# Patient Record
Sex: FEMALE | Race: BLACK OR AFRICAN AMERICAN | NOT HISPANIC OR LATINO | Employment: UNEMPLOYED | RURAL
[De-identification: names, ages, dates, MRNs, and addresses within clinical notes are randomized per-mention and may not be internally consistent; named-entity substitution may affect disease eponyms.]

---

## 2021-06-23 ENCOUNTER — OFFICE VISIT (OUTPATIENT)
Dept: FAMILY MEDICINE | Facility: CLINIC | Age: 51
End: 2021-06-23
Payer: MEDICARE

## 2021-06-23 VITALS
WEIGHT: 293 LBS | TEMPERATURE: 98 F | RESPIRATION RATE: 18 BRPM | HEART RATE: 83 BPM | HEIGHT: 62 IN | DIASTOLIC BLOOD PRESSURE: 67 MMHG | BODY MASS INDEX: 53.92 KG/M2 | SYSTOLIC BLOOD PRESSURE: 96 MMHG

## 2021-06-23 DIAGNOSIS — I73.9 PERIPHERAL VASCULAR DISEASE, UNSPECIFIED: ICD-10-CM

## 2021-06-23 DIAGNOSIS — M79.604 BILATERAL LOWER EXTREMITY PAIN: ICD-10-CM

## 2021-06-23 DIAGNOSIS — R21 RASH AND NONSPECIFIC SKIN ERUPTION: ICD-10-CM

## 2021-06-23 DIAGNOSIS — I10 HYPERTENSION, UNSPECIFIED TYPE: ICD-10-CM

## 2021-06-23 DIAGNOSIS — E78.5 HYPERLIPIDEMIA, UNSPECIFIED HYPERLIPIDEMIA TYPE: ICD-10-CM

## 2021-06-23 DIAGNOSIS — Z00.00 ROUTINE GENERAL MEDICAL EXAMINATION AT A HEALTH CARE FACILITY: Primary | ICD-10-CM

## 2021-06-23 DIAGNOSIS — N19 RENAL FAILURE, UNSPECIFIED CHRONICITY: ICD-10-CM

## 2021-06-23 DIAGNOSIS — M79.605 BILATERAL LOWER EXTREMITY PAIN: ICD-10-CM

## 2021-06-23 LAB
BASOPHILS # BLD AUTO: 0.07 K/UL (ref 0–0.2)
BASOPHILS NFR BLD AUTO: 1.2 % (ref 0–1)
DIFFERENTIAL METHOD BLD: ABNORMAL
EOSINOPHIL # BLD AUTO: 0.3 K/UL (ref 0–0.5)
EOSINOPHIL NFR BLD AUTO: 5.3 % (ref 1–4)
ERYTHROCYTE [DISTWIDTH] IN BLOOD BY AUTOMATED COUNT: 13.4 % (ref 11.5–14.5)
EST. AVERAGE GLUCOSE BLD GHB EST-MCNC: 87 MG/DL
HBA1C MFR BLD HPLC: 5.2 % (ref 4.5–6.6)
HCT VFR BLD AUTO: 43.2 % (ref 38–47)
HGB BLD-MCNC: 13.2 G/DL (ref 12–16)
IMM GRANULOCYTES # BLD AUTO: 0.02 K/UL (ref 0–0.04)
IMM GRANULOCYTES NFR BLD: 0.4 % (ref 0–0.4)
LYMPHOCYTES # BLD AUTO: 1.35 K/UL (ref 1–4.8)
LYMPHOCYTES NFR BLD AUTO: 23.8 % (ref 27–41)
MCH RBC QN AUTO: 29.2 PG (ref 27–31)
MCHC RBC AUTO-ENTMCNC: 30.6 G/DL (ref 32–36)
MCV RBC AUTO: 95.6 FL (ref 80–96)
MONOCYTES # BLD AUTO: 0.47 K/UL (ref 0–0.8)
MONOCYTES NFR BLD AUTO: 8.3 % (ref 2–6)
MPC BLD CALC-MCNC: 10.3 FL (ref 9.4–12.4)
NEUTROPHILS # BLD AUTO: 3.46 K/UL (ref 1.8–7.7)
NEUTROPHILS NFR BLD AUTO: 61 % (ref 53–65)
NRBC # BLD AUTO: 0 X10E3/UL
NRBC, AUTO (.00): 0 %
PLATELET # BLD AUTO: 225 K/UL (ref 150–400)
RBC # BLD AUTO: 4.52 M/UL (ref 4.2–5.4)
WBC # BLD AUTO: 5.67 K/UL (ref 4.5–11)

## 2021-06-23 PROCEDURE — 3008F BODY MASS INDEX DOCD: CPT | Mod: ,,, | Performed by: NURSE PRACTITIONER

## 2021-06-23 PROCEDURE — 83036 HEMOGLOBIN GLYCOSYLATED A1C: CPT | Mod: GZ,,, | Performed by: CLINICAL MEDICAL LABORATORY

## 2021-06-23 PROCEDURE — 85025 COMPLETE CBC W/AUTO DIFF WBC: CPT | Mod: ,,, | Performed by: CLINICAL MEDICAL LABORATORY

## 2021-06-23 PROCEDURE — 80061 LIPID PANEL: CPT | Mod: ,,, | Performed by: CLINICAL MEDICAL LABORATORY

## 2021-06-23 PROCEDURE — 80061 LIPID PANEL: ICD-10-PCS | Mod: ,,, | Performed by: CLINICAL MEDICAL LABORATORY

## 2021-06-23 PROCEDURE — 99204 PR OFFICE/OUTPT VISIT, NEW, LEVL IV, 45-59 MIN: ICD-10-PCS | Mod: ,,, | Performed by: NURSE PRACTITIONER

## 2021-06-23 PROCEDURE — 99204 OFFICE O/P NEW MOD 45 MIN: CPT | Mod: ,,, | Performed by: NURSE PRACTITIONER

## 2021-06-23 PROCEDURE — 83036 HEMOGLOBIN A1C: ICD-10-PCS | Mod: GZ,,, | Performed by: CLINICAL MEDICAL LABORATORY

## 2021-06-23 PROCEDURE — 80053 COMPREHEN METABOLIC PANEL: CPT | Mod: ,,, | Performed by: CLINICAL MEDICAL LABORATORY

## 2021-06-23 PROCEDURE — 80053 COMPREHENSIVE METABOLIC PANEL: ICD-10-PCS | Mod: ,,, | Performed by: CLINICAL MEDICAL LABORATORY

## 2021-06-23 PROCEDURE — 85025 CBC WITH DIFFERENTIAL: ICD-10-PCS | Mod: ,,, | Performed by: CLINICAL MEDICAL LABORATORY

## 2021-06-23 PROCEDURE — 3008F PR BODY MASS INDEX (BMI) DOCUMENTED: ICD-10-PCS | Mod: ,,, | Performed by: NURSE PRACTITIONER

## 2021-06-23 RX ORDER — ALBUTEROL SULFATE 90 UG/1
2 AEROSOL, METERED RESPIRATORY (INHALATION)
COMMUNITY
Start: 2021-05-25

## 2021-06-23 RX ORDER — CARVEDILOL 3.12 MG/1
3.12 TABLET ORAL 2 TIMES DAILY
COMMUNITY
Start: 2021-04-28 | End: 2023-06-27 | Stop reason: ALTCHOICE

## 2021-06-23 RX ORDER — FLUTICASONE PROPIONATE AND SALMETEROL 250; 50 UG/1; UG/1
1 POWDER RESPIRATORY (INHALATION) 2 TIMES DAILY
COMMUNITY
Start: 2021-05-25

## 2021-06-23 RX ORDER — SEVELAMER CARBONATE 800 MG/1
800 TABLET, FILM COATED ORAL
COMMUNITY

## 2021-06-23 RX ORDER — MIDODRINE HYDROCHLORIDE 5 MG/1
5 TABLET ORAL 2 TIMES DAILY PRN
COMMUNITY
Start: 2021-06-16

## 2021-06-23 RX ORDER — PANTOPRAZOLE SODIUM 40 MG/1
40 TABLET, DELAYED RELEASE ORAL DAILY
COMMUNITY
Start: 2021-04-28 | End: 2023-06-27 | Stop reason: SDUPTHER

## 2021-06-23 RX ORDER — CINACALCET 30 MG/1
30 TABLET, FILM COATED ORAL NIGHTLY
COMMUNITY

## 2021-06-24 LAB
ALBUMIN SERPL BCP-MCNC: 3.4 G/DL (ref 3.5–5)
ALBUMIN/GLOB SERPL: 0.7 {RATIO}
ALP SERPL-CCNC: 119 U/L (ref 39–100)
ALT SERPL W P-5'-P-CCNC: 17 U/L (ref 13–56)
ANION GAP SERPL CALCULATED.3IONS-SCNC: 14 MMOL/L (ref 7–16)
AST SERPL W P-5'-P-CCNC: 14 U/L (ref 15–37)
BILIRUB SERPL-MCNC: 0.4 MG/DL (ref 0–1.2)
BUN SERPL-MCNC: 31 MG/DL (ref 7–18)
BUN/CREAT SERPL: 3 (ref 6–20)
CALCIUM SERPL-MCNC: 8.9 MG/DL (ref 8.5–10.1)
CHLORIDE SERPL-SCNC: 93 MMOL/L (ref 98–107)
CHOLEST SERPL-MCNC: 216 MG/DL (ref 0–200)
CHOLEST/HDLC SERPL: 4 {RATIO}
CO2 SERPL-SCNC: 32 MMOL/L (ref 21–32)
CREAT SERPL-MCNC: 10.6 MG/DL (ref 0.55–1.02)
GLOBULIN SER-MCNC: 5.1 G/DL (ref 2–4)
GLUCOSE SERPL-MCNC: 83 MG/DL (ref 74–106)
HDLC SERPL-MCNC: 54 MG/DL (ref 40–60)
LDLC SERPL CALC-MCNC: 127 MG/DL
LDLC/HDLC SERPL: 2.4 {RATIO}
NONHDLC SERPL-MCNC: 162 MG/DL
POTASSIUM SERPL-SCNC: 4.5 MMOL/L (ref 3.5–5.1)
PROT SERPL-MCNC: 8.5 G/DL (ref 6.4–8.2)
SODIUM SERPL-SCNC: 134 MMOL/L (ref 136–145)
TRIGL SERPL-MCNC: 177 MG/DL (ref 35–150)
VLDLC SERPL-MCNC: 35 MG/DL

## 2021-06-25 PROBLEM — M79.604 BILATERAL LOWER EXTREMITY PAIN: Status: ACTIVE | Noted: 2021-06-25

## 2021-06-25 PROBLEM — M79.605 BILATERAL LOWER EXTREMITY PAIN: Status: ACTIVE | Noted: 2021-06-25

## 2021-06-25 PROBLEM — E78.5 HYPERLIPIDEMIA: Status: ACTIVE | Noted: 2021-06-25

## 2021-06-25 PROBLEM — R21 RASH AND NONSPECIFIC SKIN ERUPTION: Status: ACTIVE | Noted: 2021-06-25

## 2021-06-25 PROBLEM — I10 HYPERTENSION: Status: ACTIVE | Noted: 2021-06-25

## 2021-06-25 PROBLEM — I73.9 PERIPHERAL VASCULAR DISEASE, UNSPECIFIED: Status: ACTIVE | Noted: 2021-06-25

## 2021-06-25 PROBLEM — N19 RENAL FAILURE: Status: ACTIVE | Noted: 2021-06-25

## 2021-06-25 PROBLEM — Z00.00 ROUTINE GENERAL MEDICAL EXAMINATION AT A HEALTH CARE FACILITY: Status: ACTIVE | Noted: 2021-06-25

## 2021-06-28 ENCOUNTER — CLINICAL SUPPORT (OUTPATIENT)
Dept: FAMILY MEDICINE | Facility: CLINIC | Age: 51
End: 2021-06-28
Payer: MEDICARE

## 2021-06-28 DIAGNOSIS — R77.1 ABNORMALITY OF GLOBULIN: Primary | ICD-10-CM

## 2021-06-28 PROCEDURE — 84165 PROTEIN E-PHORESIS SERUM: CPT | Mod: 26,,, | Performed by: PATHOLOGY

## 2021-06-28 PROCEDURE — 84165 PR  PROTEIN E-PHORESIS, SERUM: ICD-10-PCS | Mod: ,,, | Performed by: CLINICAL MEDICAL LABORATORY

## 2021-06-28 PROCEDURE — 84165 PR  PROTEIN E-PHORESIS, SERUM: ICD-10-PCS | Mod: 26,,, | Performed by: PATHOLOGY

## 2021-06-28 PROCEDURE — 84165 PROTEIN E-PHORESIS SERUM: CPT | Mod: ,,, | Performed by: CLINICAL MEDICAL LABORATORY

## 2021-06-29 LAB
ALBUMIN PE, BLOOD: 4.51 G/DL (ref 3.5–5.2)
ALPHA1 GLOB SERPL ELPH-MCNC: 0.2 G/DL (ref 0.1–0.4)
ALPHA2 GLOB SERPL ELPH-MCNC: 0.7 G/DL (ref 0.4–1.3)
B-GLOBULIN SERPL ELPH-MCNC: 0.8 G/DL (ref 0.5–1.5)
GAMMA GLOB SERPL ELPH-MCNC: 1.4 G/DL (ref 0.5–1.8)
PATH INTERP BLD-IMP: NORMAL
PROT SERPL-MCNC: 7.6 G/DL (ref 6.4–8.2)

## 2021-06-30 PROBLEM — R77.1 ABNORMALITY OF GLOBULIN: Status: ACTIVE | Noted: 2021-06-30

## 2021-07-26 ENCOUNTER — HOSPITAL ENCOUNTER (OUTPATIENT)
Dept: RADIOLOGY | Facility: HOSPITAL | Age: 51
Discharge: HOME | End: 2021-07-26
Attending: NURSE PRACTITIONER
Payer: MEDICARE

## 2021-07-26 DIAGNOSIS — M79.604 BILATERAL LOWER EXTREMITY PAIN: ICD-10-CM

## 2021-07-26 DIAGNOSIS — I73.9 PERIPHERAL VASCULAR DISEASE, UNSPECIFIED: ICD-10-CM

## 2021-07-26 DIAGNOSIS — M79.605 BILATERAL LOWER EXTREMITY PAIN: ICD-10-CM

## 2021-07-26 PROCEDURE — 25500020 PHARM REV CODE 255: Performed by: NURSE PRACTITIONER

## 2021-07-26 PROCEDURE — 75635 CTA RUNOFF ABD PEL BILAT LOWER EXT: ICD-10-PCS | Mod: 26,,, | Performed by: RADIOLOGY

## 2021-07-26 PROCEDURE — 75635 CT ANGIO ABDOMINAL ARTERIES: CPT | Mod: 26,,, | Performed by: RADIOLOGY

## 2021-07-26 PROCEDURE — 75635 CT ANGIO ABDOMINAL ARTERIES: CPT | Mod: TC

## 2021-07-26 RX ADMIN — IOPAMIDOL 125 ML: 755 INJECTION, SOLUTION INTRAVENOUS at 10:07

## 2021-08-09 ENCOUNTER — OFFICE VISIT (OUTPATIENT)
Dept: FAMILY MEDICINE | Facility: CLINIC | Age: 51
End: 2021-08-09
Payer: MEDICARE

## 2021-08-09 VITALS
BODY MASS INDEX: 53.92 KG/M2 | TEMPERATURE: 97 F | RESPIRATION RATE: 18 BRPM | HEART RATE: 86 BPM | WEIGHT: 293 LBS | HEIGHT: 62 IN | SYSTOLIC BLOOD PRESSURE: 133 MMHG | DIASTOLIC BLOOD PRESSURE: 83 MMHG

## 2021-08-09 DIAGNOSIS — G62.9 NEUROPATHY: ICD-10-CM

## 2021-08-09 DIAGNOSIS — Z12.4 ENCOUNTER FOR PAP CERVICAL SMEAR FOLLOWING PRIOR ABNORMAL SMEAR: Primary | ICD-10-CM

## 2021-08-09 PROCEDURE — 1159F PR MEDICATION LIST DOCUMENTED IN MEDICAL RECORD: ICD-10-PCS | Mod: ,,, | Performed by: NURSE PRACTITIONER

## 2021-08-09 PROCEDURE — 3008F PR BODY MASS INDEX (BMI) DOCUMENTED: ICD-10-PCS | Mod: ,,, | Performed by: NURSE PRACTITIONER

## 2021-08-09 PROCEDURE — 3079F PR MOST RECENT DIASTOLIC BLOOD PRESSURE 80-89 MM HG: ICD-10-PCS | Mod: ,,, | Performed by: NURSE PRACTITIONER

## 2021-08-09 PROCEDURE — 3008F BODY MASS INDEX DOCD: CPT | Mod: ,,, | Performed by: NURSE PRACTITIONER

## 2021-08-09 PROCEDURE — 3044F PR MOST RECENT HEMOGLOBIN A1C LEVEL <7.0%: ICD-10-PCS | Mod: ,,, | Performed by: NURSE PRACTITIONER

## 2021-08-09 PROCEDURE — 87624 HPV HI-RISK TYP POOLED RSLT: CPT | Mod: ,,, | Performed by: CLINICAL MEDICAL LABORATORY

## 2021-08-09 PROCEDURE — 3075F PR MOST RECENT SYSTOLIC BLOOD PRESS GE 130-139MM HG: ICD-10-PCS | Mod: ,,, | Performed by: NURSE PRACTITIONER

## 2021-08-09 PROCEDURE — 3044F HG A1C LEVEL LT 7.0%: CPT | Mod: ,,, | Performed by: NURSE PRACTITIONER

## 2021-08-09 PROCEDURE — 87591 N.GONORRHOEAE DNA AMP PROB: CPT | Mod: ,,, | Performed by: CLINICAL MEDICAL LABORATORY

## 2021-08-09 PROCEDURE — 88142 CYTOPATH C/V THIN LAYER: CPT | Mod: GCY | Performed by: NURSE PRACTITIONER

## 2021-08-09 PROCEDURE — 99213 OFFICE O/P EST LOW 20 MIN: CPT | Mod: ,,, | Performed by: NURSE PRACTITIONER

## 2021-08-09 PROCEDURE — 87624 HUMAN PAPILLOMAVIRUS (HPV): ICD-10-PCS | Mod: ,,, | Performed by: CLINICAL MEDICAL LABORATORY

## 2021-08-09 PROCEDURE — 99213 PR OFFICE/OUTPT VISIT, EST, LEVL III, 20-29 MIN: ICD-10-PCS | Mod: ,,, | Performed by: NURSE PRACTITIONER

## 2021-08-09 PROCEDURE — 3075F SYST BP GE 130 - 139MM HG: CPT | Mod: ,,, | Performed by: NURSE PRACTITIONER

## 2021-08-09 PROCEDURE — 87491 CHLMYD TRACH DNA AMP PROBE: CPT | Mod: ,,, | Performed by: CLINICAL MEDICAL LABORATORY

## 2021-08-09 PROCEDURE — 87591 CHLAMYDIA/GONORRHOEAE(GC), PCR: ICD-10-PCS | Mod: ,,, | Performed by: CLINICAL MEDICAL LABORATORY

## 2021-08-09 PROCEDURE — 87491 CHLAMYDIA/GONORRHOEAE(GC), PCR: ICD-10-PCS | Mod: ,,, | Performed by: CLINICAL MEDICAL LABORATORY

## 2021-08-09 PROCEDURE — 3079F DIAST BP 80-89 MM HG: CPT | Mod: ,,, | Performed by: NURSE PRACTITIONER

## 2021-08-09 PROCEDURE — 1159F MED LIST DOCD IN RCRD: CPT | Mod: ,,, | Performed by: NURSE PRACTITIONER

## 2021-08-09 RX ORDER — GABAPENTIN 100 MG/1
100 CAPSULE ORAL 2 TIMES DAILY
Qty: 60 CAPSULE | Refills: 2 | Status: SHIPPED | OUTPATIENT
Start: 2021-08-09 | End: 2021-12-14 | Stop reason: SDUPTHER

## 2021-08-09 RX ORDER — SIMVASTATIN 20 MG/1
20 TABLET, FILM COATED ORAL NIGHTLY
Qty: 90 TABLET | Refills: 3 | Status: SHIPPED | OUTPATIENT
Start: 2021-08-09 | End: 2022-09-08

## 2021-08-10 LAB
GH SERPL-MCNC: NORMAL NG/ML
INSULIN SERPL-ACNC: NORMAL U[IU]/ML
LAB AP CLINICAL INFORMATION: NORMAL
LAB AP GYN INTERPRETATION: NEGATIVE
LAB AP PAP DISCLAIMER COMMENTS: NORMAL
RENIN PLAS-CCNC: NORMAL NG/ML/H

## 2021-08-12 LAB
HPV 16: NEGATIVE
HPV 18: NEGATIVE
HPV OTHER: NEGATIVE

## 2021-08-13 LAB
CHLAMYDIA BY PCR: NEGATIVE
N. GONORRHOEAE (GC) BY PCR: NEGATIVE

## 2021-08-31 ENCOUNTER — OFFICE VISIT (OUTPATIENT)
Dept: DERMATOLOGY | Facility: CLINIC | Age: 51
End: 2021-08-31
Payer: MEDICARE

## 2021-08-31 VITALS — HEIGHT: 62 IN | RESPIRATION RATE: 18 BRPM | BODY MASS INDEX: 53.92 KG/M2 | WEIGHT: 293 LBS

## 2021-08-31 DIAGNOSIS — L98.8 ACQUIRED PERFORATING DERMATOSIS: Primary | ICD-10-CM

## 2021-08-31 DIAGNOSIS — L85.3 XEROSIS CUTIS: ICD-10-CM

## 2021-08-31 PROCEDURE — 3008F PR BODY MASS INDEX (BMI) DOCUMENTED: ICD-10-PCS | Mod: CPTII,,, | Performed by: STUDENT IN AN ORGANIZED HEALTH CARE EDUCATION/TRAINING PROGRAM

## 2021-08-31 PROCEDURE — 1160F PR REVIEW ALL MEDS BY PRESCRIBER/CLIN PHARMACIST DOCUMENTED: ICD-10-PCS | Mod: CPTII,,, | Performed by: STUDENT IN AN ORGANIZED HEALTH CARE EDUCATION/TRAINING PROGRAM

## 2021-08-31 PROCEDURE — 99203 OFFICE O/P NEW LOW 30 MIN: CPT | Mod: ,,, | Performed by: STUDENT IN AN ORGANIZED HEALTH CARE EDUCATION/TRAINING PROGRAM

## 2021-08-31 PROCEDURE — 3044F PR MOST RECENT HEMOGLOBIN A1C LEVEL <7.0%: ICD-10-PCS | Mod: CPTII,,, | Performed by: STUDENT IN AN ORGANIZED HEALTH CARE EDUCATION/TRAINING PROGRAM

## 2021-08-31 PROCEDURE — 1160F RVW MEDS BY RX/DR IN RCRD: CPT | Mod: CPTII,,, | Performed by: STUDENT IN AN ORGANIZED HEALTH CARE EDUCATION/TRAINING PROGRAM

## 2021-08-31 PROCEDURE — 3044F HG A1C LEVEL LT 7.0%: CPT | Mod: CPTII,,, | Performed by: STUDENT IN AN ORGANIZED HEALTH CARE EDUCATION/TRAINING PROGRAM

## 2021-08-31 PROCEDURE — 99203 PR OFFICE/OUTPT VISIT, NEW, LEVL III, 30-44 MIN: ICD-10-PCS | Mod: ,,, | Performed by: STUDENT IN AN ORGANIZED HEALTH CARE EDUCATION/TRAINING PROGRAM

## 2021-08-31 PROCEDURE — 1159F MED LIST DOCD IN RCRD: CPT | Mod: CPTII,,, | Performed by: STUDENT IN AN ORGANIZED HEALTH CARE EDUCATION/TRAINING PROGRAM

## 2021-08-31 PROCEDURE — 1159F PR MEDICATION LIST DOCUMENTED IN MEDICAL RECORD: ICD-10-PCS | Mod: CPTII,,, | Performed by: STUDENT IN AN ORGANIZED HEALTH CARE EDUCATION/TRAINING PROGRAM

## 2021-08-31 PROCEDURE — 3008F BODY MASS INDEX DOCD: CPT | Mod: CPTII,,, | Performed by: STUDENT IN AN ORGANIZED HEALTH CARE EDUCATION/TRAINING PROGRAM

## 2021-09-27 PROBLEM — Z00.00 ROUTINE GENERAL MEDICAL EXAMINATION AT A HEALTH CARE FACILITY: Status: RESOLVED | Noted: 2021-06-25 | Resolved: 2021-09-27

## 2021-12-14 ENCOUNTER — OFFICE VISIT (OUTPATIENT)
Dept: FAMILY MEDICINE | Facility: CLINIC | Age: 51
End: 2021-12-14
Payer: MEDICARE

## 2021-12-14 VITALS
HEART RATE: 110 BPM | HEIGHT: 62 IN | DIASTOLIC BLOOD PRESSURE: 74 MMHG | RESPIRATION RATE: 20 BRPM | TEMPERATURE: 98 F | BODY MASS INDEX: 53.92 KG/M2 | WEIGHT: 293 LBS | SYSTOLIC BLOOD PRESSURE: 113 MMHG

## 2021-12-14 DIAGNOSIS — I50.9 HEART FAILURE, UNSPECIFIED HF CHRONICITY, UNSPECIFIED HEART FAILURE TYPE: ICD-10-CM

## 2021-12-14 DIAGNOSIS — Z20.822 CONTACT WITH AND (SUSPECTED) EXPOSURE TO COVID-19: ICD-10-CM

## 2021-12-14 DIAGNOSIS — N18.6 END STAGE RENAL DISEASE: ICD-10-CM

## 2021-12-14 DIAGNOSIS — J06.9 UPPER RESPIRATORY TRACT INFECTION, UNSPECIFIED TYPE: Primary | ICD-10-CM

## 2021-12-14 DIAGNOSIS — J32.9 SINUSITIS, UNSPECIFIED CHRONICITY, UNSPECIFIED LOCATION: ICD-10-CM

## 2021-12-14 DIAGNOSIS — E66.01 MORBID OBESITY: ICD-10-CM

## 2021-12-14 DIAGNOSIS — R05.9 COUGH: ICD-10-CM

## 2021-12-14 LAB
CTP QC/QA: YES
FLUAV AG NPH QL: NEGATIVE
FLUBV AG NPH QL: NEGATIVE
SARS-COV-2 AG RESP QL IA.RAPID: NEGATIVE

## 2021-12-14 PROCEDURE — 96372 PR INJECTION,THERAP/PROPH/DIAG2ST, IM OR SUBCUT: ICD-10-PCS | Mod: ,,, | Performed by: NURSE PRACTITIONER

## 2021-12-14 PROCEDURE — 96372 THER/PROPH/DIAG INJ SC/IM: CPT | Mod: ,,, | Performed by: NURSE PRACTITIONER

## 2021-12-14 PROCEDURE — 87428 POCT SARS-COV2 (COVID) WITH FLU ANTIGEN: ICD-10-PCS | Mod: QW,,, | Performed by: NURSE PRACTITIONER

## 2021-12-14 PROCEDURE — 87428 SARSCOV & INF VIR A&B AG IA: CPT | Mod: QW,,, | Performed by: NURSE PRACTITIONER

## 2021-12-14 PROCEDURE — 99213 PR OFFICE/OUTPT VISIT, EST, LEVL III, 20-29 MIN: ICD-10-PCS | Mod: 25,,, | Performed by: NURSE PRACTITIONER

## 2021-12-14 PROCEDURE — 99213 OFFICE O/P EST LOW 20 MIN: CPT | Mod: 25,,, | Performed by: NURSE PRACTITIONER

## 2021-12-14 RX ORDER — GABAPENTIN 100 MG/1
100 CAPSULE ORAL 2 TIMES DAILY
Qty: 60 CAPSULE | Refills: 2 | Status: SHIPPED | OUTPATIENT
Start: 2021-12-14 | End: 2022-05-10 | Stop reason: SDUPTHER

## 2021-12-14 RX ORDER — CEFTRIAXONE 1 G/1
1 INJECTION, POWDER, FOR SOLUTION INTRAMUSCULAR; INTRAVENOUS ONCE
Status: COMPLETED | OUTPATIENT
Start: 2021-12-14 | End: 2021-12-14

## 2021-12-14 RX ORDER — DEXAMETHASONE SODIUM PHOSPHATE 4 MG/ML
4 INJECTION, SOLUTION INTRA-ARTICULAR; INTRALESIONAL; INTRAMUSCULAR; INTRAVENOUS; SOFT TISSUE ONCE
Status: COMPLETED | OUTPATIENT
Start: 2021-12-14 | End: 2021-12-14

## 2021-12-14 RX ORDER — METHYLPREDNISOLONE 4 MG/1
TABLET ORAL
Qty: 21 EACH | Refills: 0 | Status: SHIPPED | OUTPATIENT
Start: 2021-12-14 | End: 2022-01-04

## 2021-12-14 RX ORDER — METHYLPREDNISOLONE 4 MG/1
TABLET ORAL
Qty: 21 EACH | Refills: 0 | Status: SHIPPED | OUTPATIENT
Start: 2021-12-14 | End: 2021-12-14 | Stop reason: SDUPTHER

## 2021-12-14 RX ORDER — AMOXICILLIN AND CLAVULANATE POTASSIUM 500; 125 MG/1; MG/1
TABLET, FILM COATED ORAL
Qty: 5 TABLET | Refills: 0 | Status: SHIPPED | OUTPATIENT
Start: 2021-12-14 | End: 2021-12-14 | Stop reason: SDUPTHER

## 2021-12-14 RX ORDER — METHYLPREDNISOLONE ACETATE 80 MG/ML
40 INJECTION, SUSPENSION INTRA-ARTICULAR; INTRALESIONAL; INTRAMUSCULAR; SOFT TISSUE ONCE
Status: COMPLETED | OUTPATIENT
Start: 2021-12-14 | End: 2021-12-14

## 2021-12-14 RX ORDER — AMOXICILLIN AND CLAVULANATE POTASSIUM 500; 125 MG/1; MG/1
TABLET, FILM COATED ORAL
Qty: 5 TABLET | Refills: 0 | Status: SHIPPED | OUTPATIENT
Start: 2021-12-14 | End: 2022-03-29 | Stop reason: SDUPTHER

## 2021-12-14 RX ADMIN — DEXAMETHASONE SODIUM PHOSPHATE 4 MG: 4 INJECTION, SOLUTION INTRA-ARTICULAR; INTRALESIONAL; INTRAMUSCULAR; INTRAVENOUS; SOFT TISSUE at 03:12

## 2021-12-14 RX ADMIN — CEFTRIAXONE 1 G: 1 INJECTION, POWDER, FOR SOLUTION INTRAMUSCULAR; INTRAVENOUS at 03:12

## 2021-12-14 RX ADMIN — METHYLPREDNISOLONE ACETATE 40 MG: 80 INJECTION, SUSPENSION INTRA-ARTICULAR; INTRALESIONAL; INTRAMUSCULAR; SOFT TISSUE at 03:12

## 2022-03-16 ENCOUNTER — HOSPITAL ENCOUNTER (OUTPATIENT)
Dept: RADIOLOGY | Facility: HOSPITAL | Age: 52
Discharge: HOME | End: 2022-03-16
Payer: MEDICARE

## 2022-03-16 DIAGNOSIS — Z12.31 SCREENING MAMMOGRAM, ENCOUNTER FOR: ICD-10-CM

## 2022-03-16 PROCEDURE — 77067 SCR MAMMO BI INCL CAD: CPT | Mod: TC

## 2022-03-29 ENCOUNTER — OFFICE VISIT (OUTPATIENT)
Dept: FAMILY MEDICINE | Facility: CLINIC | Age: 52
End: 2022-03-29
Payer: MEDICARE

## 2022-03-29 VITALS
BODY MASS INDEX: 53.92 KG/M2 | TEMPERATURE: 98 F | SYSTOLIC BLOOD PRESSURE: 120 MMHG | RESPIRATION RATE: 18 BRPM | WEIGHT: 293 LBS | HEART RATE: 106 BPM | DIASTOLIC BLOOD PRESSURE: 79 MMHG | HEIGHT: 62 IN

## 2022-03-29 DIAGNOSIS — J32.9 SINUSITIS, UNSPECIFIED CHRONICITY, UNSPECIFIED LOCATION: Primary | ICD-10-CM

## 2022-03-29 DIAGNOSIS — E66.01 MORBID OBESITY: ICD-10-CM

## 2022-03-29 DIAGNOSIS — I50.9 HEART FAILURE, UNSPECIFIED HF CHRONICITY, UNSPECIFIED HEART FAILURE TYPE: ICD-10-CM

## 2022-03-29 DIAGNOSIS — Z12.11 SCREENING FOR COLON CANCER: ICD-10-CM

## 2022-03-29 DIAGNOSIS — I73.9 PERIPHERAL VASCULAR DISEASE, UNSPECIFIED: ICD-10-CM

## 2022-03-29 DIAGNOSIS — N18.6 END STAGE RENAL DISEASE: ICD-10-CM

## 2022-03-29 PROCEDURE — 1160F PR REVIEW ALL MEDS BY PRESCRIBER/CLIN PHARMACIST DOCUMENTED: ICD-10-PCS | Mod: ,,, | Performed by: NURSE PRACTITIONER

## 2022-03-29 PROCEDURE — 99213 PR OFFICE/OUTPT VISIT, EST, LEVL III, 20-29 MIN: ICD-10-PCS | Mod: 25,,, | Performed by: NURSE PRACTITIONER

## 2022-03-29 PROCEDURE — 3078F DIAST BP <80 MM HG: CPT | Mod: ,,, | Performed by: NURSE PRACTITIONER

## 2022-03-29 PROCEDURE — 96372 THER/PROPH/DIAG INJ SC/IM: CPT | Mod: ,,, | Performed by: NURSE PRACTITIONER

## 2022-03-29 PROCEDURE — 96372 PR INJECTION,THERAP/PROPH/DIAG2ST, IM OR SUBCUT: ICD-10-PCS | Mod: ,,, | Performed by: NURSE PRACTITIONER

## 2022-03-29 PROCEDURE — 1159F PR MEDICATION LIST DOCUMENTED IN MEDICAL RECORD: ICD-10-PCS | Mod: ,,, | Performed by: NURSE PRACTITIONER

## 2022-03-29 PROCEDURE — 3008F BODY MASS INDEX DOCD: CPT | Mod: ,,, | Performed by: NURSE PRACTITIONER

## 2022-03-29 PROCEDURE — 1159F MED LIST DOCD IN RCRD: CPT | Mod: ,,, | Performed by: NURSE PRACTITIONER

## 2022-03-29 PROCEDURE — 99213 OFFICE O/P EST LOW 20 MIN: CPT | Mod: 25,,, | Performed by: NURSE PRACTITIONER

## 2022-03-29 PROCEDURE — 3078F PR MOST RECENT DIASTOLIC BLOOD PRESSURE < 80 MM HG: ICD-10-PCS | Mod: ,,, | Performed by: NURSE PRACTITIONER

## 2022-03-29 PROCEDURE — 3074F SYST BP LT 130 MM HG: CPT | Mod: ,,, | Performed by: NURSE PRACTITIONER

## 2022-03-29 PROCEDURE — 3074F PR MOST RECENT SYSTOLIC BLOOD PRESSURE < 130 MM HG: ICD-10-PCS | Mod: ,,, | Performed by: NURSE PRACTITIONER

## 2022-03-29 PROCEDURE — 1160F RVW MEDS BY RX/DR IN RCRD: CPT | Mod: ,,, | Performed by: NURSE PRACTITIONER

## 2022-03-29 PROCEDURE — 3008F PR BODY MASS INDEX (BMI) DOCUMENTED: ICD-10-PCS | Mod: ,,, | Performed by: NURSE PRACTITIONER

## 2022-03-29 RX ORDER — CEFTRIAXONE 1 G/1
1 INJECTION, POWDER, FOR SOLUTION INTRAMUSCULAR; INTRAVENOUS ONCE
Status: COMPLETED | OUTPATIENT
Start: 2022-03-29 | End: 2022-03-29

## 2022-03-29 RX ORDER — CIPROFLOXACIN AND DEXAMETHASONE 3; 1 MG/ML; MG/ML
4 SUSPENSION/ DROPS AURICULAR (OTIC) 2 TIMES DAILY
Qty: 7.5 ML | Refills: 0 | Status: SHIPPED | OUTPATIENT
Start: 2022-03-29 | End: 2022-12-21 | Stop reason: SDUPTHER

## 2022-03-29 RX ORDER — AMOXICILLIN AND CLAVULANATE POTASSIUM 500; 125 MG/1; MG/1
TABLET, FILM COATED ORAL
Qty: 5 TABLET | Refills: 0 | Status: SHIPPED | OUTPATIENT
Start: 2022-03-29 | End: 2022-12-21

## 2022-03-29 RX ORDER — DEXAMETHASONE SODIUM PHOSPHATE 4 MG/ML
4 INJECTION, SOLUTION INTRA-ARTICULAR; INTRALESIONAL; INTRAMUSCULAR; INTRAVENOUS; SOFT TISSUE ONCE
Status: COMPLETED | OUTPATIENT
Start: 2022-03-29 | End: 2022-03-29

## 2022-03-29 RX ORDER — CETIRIZINE HYDROCHLORIDE 5 MG/1
5 TABLET, CHEWABLE ORAL DAILY
Qty: 30 TABLET | Refills: 11 | Status: SHIPPED | OUTPATIENT
Start: 2022-03-29 | End: 2023-06-27

## 2022-03-29 RX ORDER — METHYLPREDNISOLONE ACETATE 80 MG/ML
40 INJECTION, SUSPENSION INTRA-ARTICULAR; INTRALESIONAL; INTRAMUSCULAR; SOFT TISSUE ONCE
Status: COMPLETED | OUTPATIENT
Start: 2022-03-29 | End: 2022-03-29

## 2022-03-29 RX ADMIN — DEXAMETHASONE SODIUM PHOSPHATE 4 MG: 4 INJECTION, SOLUTION INTRA-ARTICULAR; INTRALESIONAL; INTRAMUSCULAR; INTRAVENOUS; SOFT TISSUE at 03:03

## 2022-03-29 RX ADMIN — CEFTRIAXONE 1 G: 1 INJECTION, POWDER, FOR SOLUTION INTRAMUSCULAR; INTRAVENOUS at 03:03

## 2022-03-29 RX ADMIN — METHYLPREDNISOLONE ACETATE 40 MG: 80 INJECTION, SUSPENSION INTRA-ARTICULAR; INTRALESIONAL; INTRAMUSCULAR; SOFT TISSUE at 03:03

## 2022-03-31 PROBLEM — J32.9 SINUSITIS: Status: ACTIVE | Noted: 2022-03-31

## 2022-03-31 PROBLEM — Z12.11 SCREENING FOR COLON CANCER: Status: ACTIVE | Noted: 2022-03-31

## 2022-03-31 NOTE — PROGRESS NOTES
JASON Allen   [address] [place] [address]     PATIENT NAME: Yaa Frank  : 1970  DATE: 3/29/22  MRN: [id number]      Billing Provider: JASON Allen  Level of Service: CO OFFICE/OUTPT VISIT, EST, LEVL III, 20-29 MIN  Patient PCP Information     Provider PCP Type    JASON Allen General          Reason for Visit / Chief Complaint: Sinus Problem and Ear Fullness (Bruise/ Knot on left hip)       Update PCP  Update Chief Complaint         History of Present Illness / Problem Focused Workflow     Yaa Frank presents to the clinic with Sinus Problem and Ear Fullness (Bruise/ Knot on left hip)     HPI    Review of Systems     Review of Systems   Constitutional: Positive for fatigue and fever. Negative for chills and diaphoresis.   HENT: Positive for nasal congestion and sinus pressure/congestion. Negative for dental problem, ear pain and postnasal drip.    Eyes: Negative for visual disturbance.   Respiratory: Negative for shortness of breath and wheezing.    Cardiovascular: Negative for chest pain and palpitations.   Gastrointestinal: Negative for abdominal distention and abdominal pain.   Endocrine: Negative for cold intolerance and heat intolerance.   Genitourinary: Negative for enuresis.   Musculoskeletal: Negative for neck stiffness.   Integumentary:  Negative for pallor and rash.   Neurological: Negative for dizziness, seizures, speech difficulty and weakness.   Psychiatric/Behavioral: Negative for agitation.        Medical / Social / Family History     Past Medical History:   Diagnosis Date    COVID-19     Disorder of kidney and ureter        Past Surgical History:   Procedure Laterality Date    APPENDECTOMY       SECTION      CHOLECYSTECTOMY      LAPAROSCOPIC GASTRIC BANDING      PLACEMENT OF DIALYSIS ACCESS Left        Social History  Ms.  reports that she has never smoked. She has never used smokeless tobacco. She reports previous alcohol use. She  "reports that she does not use drugs.    Family History  MsRoland's family history includes Breast cancer in her paternal aunt.    No flowsheet data found.        Medications and Allergies     Medications  No outpatient medications have been marked as taking for the 3/29/22 encounter (Office Visit) with JASON Allen.       Allergies  Review of patient's allergies indicates:   Allergen Reactions    Ancef [cefazolin]     Hydralazine     Hydralazine analogues     Latex, natural rubber        Physical Examination   /79 (BP Location: Left arm, Patient Position: Standing, BP Method: Large (Automatic))   Pulse 106   Temp 98.2 °F (36.8 °C) (Temporal)   Resp 18   Ht 5' 2" (1.575 m)   Wt (!) 142 kg (313 lb)   BMI 57.25 kg/m²   Physical Exam  Vitals and nursing note reviewed.   Constitutional:       Appearance: Normal appearance.   HENT:      Head: Normocephalic and atraumatic.      Right Ear: External ear normal.      Left Ear: External ear normal.      Nose: Congestion and rhinorrhea present.      Right Turbinates: Swollen.      Left Turbinates: Swollen.      Right Sinus: Maxillary sinus tenderness present.      Left Sinus: Maxillary sinus tenderness present.      Mouth/Throat:      Mouth: Mucous membranes are moist.      Pharynx: Oropharynx is clear. Posterior oropharyngeal erythema present. No oropharyngeal exudate.   Eyes:      Pupils: Pupils are equal, round, and reactive to light.   Cardiovascular:      Rate and Rhythm: Normal rate and regular rhythm.      Pulses: Normal pulses.      Heart sounds: Normal heart sounds. No murmur heard.    No gallop.   Pulmonary:      Effort: Pulmonary effort is normal.      Breath sounds: No wheezing or rales.   Abdominal:      General: Bowel sounds are normal. There is no distension.      Palpations: Abdomen is soft.      Tenderness: There is no abdominal tenderness.   Musculoskeletal:         General: Normal range of motion.      Cervical back: Normal range of motion " and neck supple.   Skin:     General: Skin is warm and dry.      Capillary Refill: Capillary refill takes less than 2 seconds.   Neurological:      General: No focal deficit present.      Mental Status: She is alert and oriented to person, place, and time.   Psychiatric:         Mood and Affect: Mood normal.         Behavior: Behavior normal.          Assessment and Plan (including Health Maintenance)      Problem List  Smart Sets  Document Outside HM   :    Plan:         Health Maintenance Due   Topic Date Due    Hepatitis C Screening  Never done    COVID-19 Vaccine (1) Never done    Pneumococcal Vaccines (Age 0-64) (1 of 4 - PCV13) Never done    HIV Screening  Never done    TETANUS VACCINE  Never done    Colorectal Cancer Screening  Never done    Influenza Vaccine (1) Never done    Shingles Vaccine (1 of 2) Never done       Problem List Items Addressed This Visit        ENT    Sinusitis - Primary    Relevant Medications    amoxicillin-clavulanate 500-125mg (AUGMENTIN) 500-125 mg Tab       Cardiac/Vascular    Peripheral vascular disease, unspecified    Heart failure       Renal/    End stage renal disease       Endocrine    Morbid obesity       GI    Screening for colon cancer    Relevant Orders    Ambulatory referral/consult to Gastroenterology          Health Maintenance Topics with due status: Not Due       Topic Last Completion Date    Lipid Panel 06/23/2021    Cervical Cancer Screening 08/09/2021    Mammogram 03/16/2022       Future Appointments   Date Time Provider Department Center   4/20/2022  4:00 PM AWV NURSE, [facility] [place]   3/22/2023 11:00 AM [facility] [place] [facility] [place]        Follow up in about 3 months (around 6/29/2022), or if symptoms worsen or fail to improve.        Signature:  JASON Allen      [address] [place] [address]    Date of encounter: 3/29/22

## 2022-05-10 ENCOUNTER — OFFICE VISIT (OUTPATIENT)
Dept: FAMILY MEDICINE | Facility: CLINIC | Age: 52
End: 2022-05-10
Payer: MEDICARE

## 2022-05-10 VITALS
SYSTOLIC BLOOD PRESSURE: 122 MMHG | DIASTOLIC BLOOD PRESSURE: 82 MMHG | RESPIRATION RATE: 22 BRPM | BODY MASS INDEX: 53.92 KG/M2 | WEIGHT: 293 LBS | HEART RATE: 90 BPM | HEIGHT: 62 IN | TEMPERATURE: 98 F | OXYGEN SATURATION: 90 %

## 2022-05-10 DIAGNOSIS — E66.3 OVERWEIGHT: ICD-10-CM

## 2022-05-10 DIAGNOSIS — E66.01 MORBID OBESITY: ICD-10-CM

## 2022-05-10 DIAGNOSIS — J45.50 SEVERE PERSISTENT ASTHMA WITHOUT COMPLICATION: ICD-10-CM

## 2022-05-10 DIAGNOSIS — E78.5 HYPERLIPIDEMIA, UNSPECIFIED HYPERLIPIDEMIA TYPE: ICD-10-CM

## 2022-05-10 DIAGNOSIS — N18.6 END STAGE RENAL DISEASE: ICD-10-CM

## 2022-05-10 DIAGNOSIS — Z00.00 ENCOUNTER FOR PREVENTIVE HEALTH EXAMINATION: ICD-10-CM

## 2022-05-10 DIAGNOSIS — Z11.59 SCREENING FOR VIRAL DISEASE: ICD-10-CM

## 2022-05-10 DIAGNOSIS — I10 HYPERTENSION, UNSPECIFIED TYPE: Primary | ICD-10-CM

## 2022-05-10 PROCEDURE — 1159F MED LIST DOCD IN RCRD: CPT | Mod: ,,, | Performed by: NURSE PRACTITIONER

## 2022-05-10 PROCEDURE — G0439 PR MEDICARE ANNUAL WELLNESS SUBSEQUENT VISIT: ICD-10-PCS | Mod: ,,, | Performed by: NURSE PRACTITIONER

## 2022-05-10 PROCEDURE — 3074F PR MOST RECENT SYSTOLIC BLOOD PRESSURE < 130 MM HG: ICD-10-PCS | Mod: ,,, | Performed by: NURSE PRACTITIONER

## 2022-05-10 PROCEDURE — 1159F PR MEDICATION LIST DOCUMENTED IN MEDICAL RECORD: ICD-10-PCS | Mod: ,,, | Performed by: NURSE PRACTITIONER

## 2022-05-10 PROCEDURE — 3074F SYST BP LT 130 MM HG: CPT | Mod: ,,, | Performed by: NURSE PRACTITIONER

## 2022-05-10 PROCEDURE — 1160F PR REVIEW ALL MEDS BY PRESCRIBER/CLIN PHARMACIST DOCUMENTED: ICD-10-PCS | Mod: ,,, | Performed by: NURSE PRACTITIONER

## 2022-05-10 PROCEDURE — 3079F DIAST BP 80-89 MM HG: CPT | Mod: ,,, | Performed by: NURSE PRACTITIONER

## 2022-05-10 PROCEDURE — 1160F RVW MEDS BY RX/DR IN RCRD: CPT | Mod: ,,, | Performed by: NURSE PRACTITIONER

## 2022-05-10 PROCEDURE — G0439 PPPS, SUBSEQ VISIT: HCPCS | Mod: ,,, | Performed by: NURSE PRACTITIONER

## 2022-05-10 PROCEDURE — 3008F PR BODY MASS INDEX (BMI) DOCUMENTED: ICD-10-PCS | Mod: ,,, | Performed by: NURSE PRACTITIONER

## 2022-05-10 PROCEDURE — 3079F PR MOST RECENT DIASTOLIC BLOOD PRESSURE 80-89 MM HG: ICD-10-PCS | Mod: ,,, | Performed by: NURSE PRACTITIONER

## 2022-05-10 PROCEDURE — 3008F BODY MASS INDEX DOCD: CPT | Mod: ,,, | Performed by: NURSE PRACTITIONER

## 2022-05-10 RX ORDER — GABAPENTIN 100 MG/1
100 CAPSULE ORAL 2 TIMES DAILY
Qty: 60 CAPSULE | Refills: 2 | Status: SHIPPED | OUTPATIENT
Start: 2022-05-10 | End: 2022-09-08

## 2022-05-10 NOTE — PROGRESS NOTES
[place] [facility]       PATIENT NAME: Yaa Frank   : 1970    AGE: 52 y.o. DATE: 05/10/2022   MRN: [id number]        Reason for Visit / Chief Complaint: Medicare AWV Follow Up ([employer] WELLNESS SUBSEQUENT VISIT)        Yaa Frank presents for an Subsequent Medicare AWV today.     The following components were reviewed and updated:    Medical/Social/Family History:  Past Medical History:   Diagnosis Date    Asthma     COVID-19     Disorder of kidney and ureter         Family History   Problem Relation Age of Onset    Breast cancer Paternal Aunt         Social History     Tobacco Use   Smoking Status Never Smoker   Smokeless Tobacco Never Used      Social History     Substance and Sexual Activity   Alcohol Use Not Currently       Family History   Problem Relation Age of Onset    Breast cancer Paternal Aunt        Past Surgical History:   Procedure Laterality Date    APPENDECTOMY       SECTION      CHOLECYSTECTOMY      LAPAROSCOPIC GASTRIC BANDING      PLACEMENT OF DIALYSIS ACCESS Left          · Allergies and Current Medications     Review of patient's allergies indicates:   Allergen Reactions    Ancef [cefazolin]     Hydralazine     Hydralazine analogues     Latex, natural rubber        Current Outpatient Medications:     albuterol (PROVENTIL/VENTOLIN HFA) 90 mcg/actuation inhaler, Inhale 2 puffs into the lungs every 4 to 6 hours as needed., Disp: , Rfl:     amoxicillin-clavulanate 500-125mg (AUGMENTIN) 500-125 mg Tab, Take on Tuesday, Thursday and Saturday after dialysis for a total of 5 doses. (Patient not taking: Reported on 5/10/2022), Disp: 5 tablet, Rfl: 0    carvediloL (COREG) 3.125 MG tablet, Take 3.125 mg by mouth 2 (two) times a day., Disp: , Rfl:     cetirizine (ZYRTEC) 5 MG chewable tablet, Take 1 tablet (5 mg total) by mouth once daily., Disp: 30 tablet, Rfl: 11    cinacalcet (SENSIPAR) 30 MG Tab, Take 30 mg  by mouth every evening., Disp: , Rfl:     ciprofloxacin-dexamethasone 0.3-0.1% (CIPRODEX) 0.3-0.1 % DrpS, Place 4 drops into both ears 2 (two) times daily., Disp: 7.5 mL, Rfl: 0    gabapentin (NEURONTIN) 100 MG capsule, Take 1 capsule (100 mg total) by mouth 2 (two) times daily., Disp: 60 capsule, Rfl: 2    midodrine (PROAMATINE) 5 MG Tab, Take 5 mg by mouth 2 (two) times a day., Disp: , Rfl:     pantoprazole (PROTONIX) 40 MG tablet, Take 40 mg by mouth once daily., Disp: , Rfl:     sevelamer carbonate (RENVELA) 800 mg Tab, Take 800 mg by mouth 4 (four) times daily with meals and nightly. 4 TABLETS WITH EACH MEAL. 2 WITH EACH SNACK., Disp: , Rfl:     simvastatin (ZOCOR) 20 MG tablet, Take 1 tablet (20 mg total) by mouth every evening., Disp: 90 tablet, Rfl: 3    WIXELA INHUB 250-50 mcg/dose diskus inhaler, Inhale 1 puff into the lungs 2 (two) times a day., Disp: , Rfl:     · Health Risk Assessment   Fall Risk: No   Obesity: BMI 57.61     Advance Directive:  Does not have an advanced directive. Verbal education and written education included in today's AVS.    X Patient is interested in learning more about how to make advanced directives.  I provided them paperwork and offered to discuss this with them.   Depression: PHQ9 -1    HTN: 122/82   T2DM: A1C- 5.2   Tobacco use: Denies tobacco use  STI: Not at risk    Alcohol misuse: Denies alcohol use Cage Score: N/A   Statin Use: Continue statin use. Encouraged heart healthy diet & exercise   Mini Co/5      · Health Risk Assessment  What is your age?:  (50-55)  Are you male or female?: Female  During the past four weeks, how much have you been bothered by emotional problems such as feeling anxious, depressed, irritable, sad, or downhearted and blue?: Not at all  During the past five weeks, has your physical and/or emotional health limited your social activities with family, friends, neighbors, or groups?: Slightly  During the past four weeks, how much bodily  pain have you generally had?: No pain  During the past four weeks, was someone available to help if you needed and wanted help?: Yes, as much as I wanted  During the past four weeks, what was the hardest physical activity you could do for at least two minutes?: Light  Can you get to places out of walking distance without help?  (For example, can you travel alone on buses or taxis, or drive your own car?): Yes  Can you go shopping for groceries or clothes without someone's help?: Yes  Can you prepare your own meals?: Yes  Can you do your own housework without help?: Yes  Because of any health problems, do you need the help of another person with your personal care needs such as eating, bathing, dressing, or getting around the house?: No  Can you handle your own money without help?: Yes  During the past four weeks, how would you rate your health in general?: Good  How have things been going for you during the past four weeks?: Good and bad parts about equal  Are you having difficulties driving your car?: No  Do you always fasten your seat belt when you are in a car?: Yes, usually  How often in the past four weeks have you been bothered by falling or dizzy when standing up?: Sometimes  How often in the past four weeks have you been bothered by sexual problems?: Never  How often in the past four weeks have you been bothered by trouble eating well?: Never  How often in the past four weeks have you been bothered by teeth or denture problems?: Never  How often in the past four weeks have you been bothered with problems using the telephone?: Never  How often in the past four weeks have you been bothered by tiredness or fatigue?: Never  Have you fallen two or more times in the past year?: No  Are you afraid of falling?: No  Are you a smoker?: No  During the past four weeks, how many drinks of wine, beer, or other alcoholic beverages did you have?: No alcohol at all  Do you exercise for about 20 minutes three or more days a  week?: Yes, some of the time  Have you been given any information to help you with hazards in your house that might hurt you?: Yes  Have you been given any information to help you with keeping track of your medications?: Yes  How often do you have trouble taking medicines the way you've been told to take them?: I always take them as prescribed  How confident are you that you can control and manage most of your health problems?: Very confident  What is your race? (Check all that apply.):     · Health Maintenance   Last eye exam: Has appt with Dr. Hidalgo tomorrow   Last CV screen with lipids: 06/23/2021   Diabetes screening with fasting glucose or A1c: 06/23/2021   Colonoscopy: Has never had. Ordered on 03/29/2022. Awaiting referral appt. Will check on this   Flu Vaccine: Out of season   Pneumonia vaccines: N/A   COVID vaccine: 03/23/2021; 04/15/2022   Hep B vaccine: Not at risk   DEXA: N/A   Last pap/pelvic: 08/09/2021- Negative with - HPV   Last Mammogram: 03/16/2022- Negative   Last PSA screen: N/A   AAA screening: N/A (once in lifetime for males 65-75 who have smoked > 100 cigarettes in lifetime)  HIV Screeing: Eligible. See standing order for next visit  Hepatitis C Screen: Eligible. See standing order for next office visit  Low Dose CT Scan: N/a    Health Maintenance Topics with due status: Not Due       Topic Last Completion Date    TETANUS VACCINE 12/27/2012    Lipid Panel 06/23/2021    Cervical Cancer Screening 08/09/2021    COVID-19 Vaccine 02/15/2022    Mammogram 03/16/2022    Influenza Vaccine Not Due     Health Maintenance Due   Topic Date Due    Hepatitis C Screening  Never done    HIV Screening  Never done        Incontinence  Bowel: No  Bladder: No    Lab results available in Epic or see dates from [facility] above:   Lab Results   Component Value Date    CHOL 216 (H) 06/23/2021     Lab Results   Component Value Date    HDL 54 06/23/2021     Lab Results   Component Value Date    LDLCALC 127  06/23/2021     Lab Results   Component Value Date    TRIG 177 (H) 06/23/2021     Lab Results   Component Value Date    CHOLHDL 4.0 06/23/2021       Lab Results   Component Value Date    HGBA1C 5.2 06/23/2021       Sodium   Date Value Ref Range Status   06/23/2021 134 (L) 136 - 145 mmol/L Final     Potassium   Date Value Ref Range Status   06/23/2021 4.5 3.5 - 5.1 mmol/L Final     Chloride   Date Value Ref Range Status   06/23/2021 93 (L) 98 - 107 mmol/L Final     CO2   Date Value Ref Range Status   06/23/2021 32 21 - 32 mmol/L Final     Glucose   Date Value Ref Range Status   06/23/2021 83 74 - 106 mg/dL Final     BUN   Date Value Ref Range Status   06/23/2021 31 (H) 7 - 18 mg/dL Final     Creatinine   Date Value Ref Range Status   06/23/2021 10.60 (H) 0.55 - 1.02 mg/dL Final     Calcium   Date Value Ref Range Status   06/23/2021 8.9 8.5 - 10.1 mg/dL Final     Total Protein   Date Value Ref Range Status   06/28/2021 7.6 6.4 - 8.2 g/dL Final     Albumin   Date Value Ref Range Status   06/23/2021 3.4 (L) 3.5 - 5.0 g/dL Final     Bilirubin, Total   Date Value Ref Range Status   06/23/2021 0.4 >0.0 - 1.2 mg/dL Final     Alk Phos   Date Value Ref Range Status   06/23/2021 119 (H) 39 - 100 U/L Final     AST   Date Value Ref Range Status   06/23/2021 14 (L) 15 - 37 U/L Final     ALT   Date Value Ref Range Status   06/23/2021 17 13 - 56 U/L Final     Anion Gap   Date Value Ref Range Status   06/23/2021 14 7 - 16 mmol/L Final     eGFR    Date Value Ref Range Status   06/23/2021 5 (L) >=60 mL/min/1.73m² Final       · Care Team   PCP: Chelsi Alcazar, NP-C    Eye specialist: Dr. Hidalgo       **See Completed Assessments for Annual Wellness visit within the encounter summary    The following assessments were completed & reviewed:  · Depression Screening  · Cognitive function Screening  · Timed Get Up Test  · Whisper Test  · Vision Screen  · Health Risk Assessment  · Checklist of ADLs and  "IADLs      Objective  Vitals:    05/10/22 1509   BP: 122/82   Pulse: 90   Resp: (!) 22   Temp: 98 °F (36.7 °C)   TempSrc: Oral   SpO2: (!) 90%   Weight: (!) 142.9 kg (315 lb)   Height: 5' 2" (1.575 m)   PainSc: 0-No pain      Body mass index is 57.61 kg/m².  Ideal body weight: 50.1 kg (110 lb 7.2 oz)       Physical Exam  Vitals and nursing note reviewed.   Constitutional:       Appearance: Normal appearance.   HENT:      Head: Normocephalic and atraumatic.      Right Ear: External ear normal.      Left Ear: External ear normal.      Nose: Nose normal.      Mouth/Throat:      Mouth: Mucous membranes are moist.      Pharynx: Oropharynx is clear.   Eyes:      Pupils: Pupils are equal, round, and reactive to light.   Cardiovascular:      Rate and Rhythm: Normal rate and regular rhythm.      Pulses: Normal pulses.      Heart sounds: Normal heart sounds. No murmur heard.    No gallop.   Pulmonary:      Effort: Pulmonary effort is normal.      Breath sounds: Normal breath sounds. No wheezing or rales.   Abdominal:      General: Abdomen is flat. Bowel sounds are normal. There is no distension.      Palpations: Abdomen is soft.      Tenderness: There is no abdominal tenderness.   Musculoskeletal:         General: Normal range of motion.      Cervical back: Normal range of motion and neck supple.   Skin:     General: Skin is warm and dry.      Capillary Refill: Capillary refill takes less than 2 seconds.   Neurological:      General: No focal deficit present.      Mental Status: She is alert and oriented to person, place, and time.   Psychiatric:         Mood and Affect: Mood normal.         Behavior: Behavior normal.           Assessment:     1. Hypertension, unspecified type    2. End stage renal disease    3. Hyperlipidemia, unspecified hyperlipidemia type    4. Morbid obesity    5. Severe persistent asthma without complication    6. Screening for viral disease  - Hepatitis C Antibody; Future  - HIV 1/2 Ag/Ab (4th Gen); " Future    7. Encounter for preventive health examination    8. Overweight         Plan:    Referrals/Orders:  None     Advised to call office if does not hear from anyone with referral appt within 2-3 weeks to check on status of referral. Voiced understanding.    Keep current on appts & continue medications as ordered.  Encouraged diet & exercise to decrease weight/BMI.        Discussed and provided with a screening schedule and personal prevention plan in accordance with USPSTF age appropriate recommendations and Medicare screening guidelines.   Education, counseling, and referrals were provided as needed.  After Visit Summary printed and given to patient which includes written education and a list of any referrals if indicated. All education discussed with patient & handouts given to patient.      F/u plan for yearly AWV.    Signature: Julee Alcazar-[facility]

## 2022-05-10 NOTE — PATIENT INSTRUCTIONS
Counseling and Referral of Other Preventative  (Italic type indicates deductible and co-insurance are waived)    Patient Name: Yaa Frank  Today's Date: 5/10/2022    Health Maintenance       Date Due Completion Date    Hepatitis C Screening Never done ---    HIV Screening Never done ---    Shingles Vaccine (1 of 2) 08/10/2022 (Originally [birth year]28/1991) ---    Colorectal Cancer Screening 11/10/2022 (Originally 1970) ---    Pneumococcal Vaccines (Age 0-64) (1 - PCV) 05/10/2023 (Originally 2/28/1978) ---    COVID-19 Vaccine (3 - Booster for Andree series) 06/15/2022 2/15/2022    Influenza Vaccine (Season Ended) 09/01/2022 ---    TETANUS VACCINE 12/27/2022 12/27/2012    Mammogram 03/16/2023 3/16/2022    Lipid Panel 06/23/2026 6/23/2021    Cervical Cancer Screening 08/09/2026 8/9/2021        Orders Placed This Encounter   Procedures    Hepatitis C Antibody    HIV 1/2 Ag/Ab (4th Gen)     The following information is provided to all patients.  This information is to help you find resources for any of the problems found today that may be affecting your health:                Living healthy guide: www.[facility].[place].gov      Understanding Diabetes: www.diabetes.org      Eating healthy: www.cdc.gov/healthyweight      [facility] home safety checklist: www.cdc.gov/steadi/patient.html      Agency on Aging: www.goea.[place].[facility]      Alcoholics anonymous (AA): www.aa.org      Physical Activity: www.breana.nih.gov/mr0wisq      Tobacco use: www.quitwithusla.org      YUE COTTO ; 10/28/2019 ; [facility] [address] Y[address] ; 10/28/2019 ; NPP [facility] [address]

## 2022-05-13 PROBLEM — Z11.59 SCREENING FOR VIRAL DISEASE: Status: ACTIVE | Noted: 2022-05-13

## 2022-05-13 PROBLEM — J45.50 SEVERE PERSISTENT ASTHMA WITHOUT COMPLICATION: Status: ACTIVE | Noted: 2022-05-13

## 2022-05-13 PROBLEM — Z00.00 ENCOUNTER FOR PREVENTIVE HEALTH EXAMINATION: Status: ACTIVE | Noted: 2022-05-13

## 2022-05-13 PROBLEM — E66.3 OVERWEIGHT: Status: ACTIVE | Noted: 2022-05-13

## 2022-05-26 DIAGNOSIS — Z12.11 COLON CANCER SCREENING: Primary | ICD-10-CM

## 2022-07-20 ENCOUNTER — ANESTHESIA (OUTPATIENT)
Dept: GASTROENTEROLOGY | Facility: HOSPITAL | Age: 52
End: 2022-07-20
Payer: MEDICARE

## 2022-07-20 ENCOUNTER — HOSPITAL ENCOUNTER (OUTPATIENT)
Dept: GASTROENTEROLOGY | Facility: HOSPITAL | Age: 52
Discharge: HOME | End: 2022-07-20
Attending: STUDENT IN AN ORGANIZED HEALTH CARE EDUCATION/TRAINING PROGRAM
Payer: MEDICARE

## 2022-07-20 ENCOUNTER — ANESTHESIA EVENT (OUTPATIENT)
Dept: GASTROENTEROLOGY | Facility: HOSPITAL | Age: 52
End: 2022-07-20
Payer: MEDICARE

## 2022-07-20 VITALS
HEIGHT: 62 IN | WEIGHT: 293 LBS | SYSTOLIC BLOOD PRESSURE: 144 MMHG | DIASTOLIC BLOOD PRESSURE: 66 MMHG | HEART RATE: 87 BPM | RESPIRATION RATE: 21 BRPM | TEMPERATURE: 97 F | BODY MASS INDEX: 53.92 KG/M2 | OXYGEN SATURATION: 85 %

## 2022-07-20 DIAGNOSIS — Z12.11 COLON CANCER SCREENING: ICD-10-CM

## 2022-07-20 PROCEDURE — D9220A PRA ANESTHESIA: ICD-10-PCS | Mod: ,,, | Performed by: NURSE ANESTHETIST, CERTIFIED REGISTERED

## 2022-07-20 PROCEDURE — 25000003 PHARM REV CODE 250: Performed by: NURSE ANESTHETIST, CERTIFIED REGISTERED

## 2022-07-20 PROCEDURE — G0121 COLON CA SCRN NOT HI RSK IND: ICD-10-PCS | Mod: ,,, | Performed by: STUDENT IN AN ORGANIZED HEALTH CARE EDUCATION/TRAINING PROGRAM

## 2022-07-20 PROCEDURE — D9220A PRA ANESTHESIA: Mod: ,,, | Performed by: NURSE ANESTHETIST, CERTIFIED REGISTERED

## 2022-07-20 PROCEDURE — G0121 COLON CA SCRN NOT HI RSK IND: HCPCS

## 2022-07-20 PROCEDURE — 37000008 HC ANESTHESIA 1ST 15 MINUTES

## 2022-07-20 PROCEDURE — G0121 COLON CA SCRN NOT HI RSK IND: HCPCS | Mod: ,,, | Performed by: STUDENT IN AN ORGANIZED HEALTH CARE EDUCATION/TRAINING PROGRAM

## 2022-07-20 PROCEDURE — 27201423 OPTIME MED/SURG SUP & DEVICES STERILE SUPPLY

## 2022-07-20 PROCEDURE — 63600175 PHARM REV CODE 636 W HCPCS: Performed by: NURSE ANESTHETIST, CERTIFIED REGISTERED

## 2022-07-20 PROCEDURE — 27000284 HC CANNULA NASAL: Performed by: NURSE ANESTHETIST, CERTIFIED REGISTERED

## 2022-07-20 PROCEDURE — 37000009 HC ANESTHESIA EA ADD 15 MINS

## 2022-07-20 PROCEDURE — 25000003 PHARM REV CODE 250: Performed by: STUDENT IN AN ORGANIZED HEALTH CARE EDUCATION/TRAINING PROGRAM

## 2022-07-20 RX ORDER — LIDOCAINE HYDROCHLORIDE 20 MG/ML
INJECTION, SOLUTION EPIDURAL; INFILTRATION; INTRACAUDAL; PERINEURAL
Status: DISCONTINUED | OUTPATIENT
Start: 2022-07-20 | End: 2022-07-20

## 2022-07-20 RX ORDER — SODIUM CHLORIDE 9 MG/ML
INJECTION, SOLUTION INTRAVENOUS CONTINUOUS
Status: DISCONTINUED | OUTPATIENT
Start: 2022-07-20 | End: 2022-07-21 | Stop reason: HOSPADM

## 2022-07-20 RX ORDER — PROPOFOL 10 MG/ML
VIAL (ML) INTRAVENOUS
Status: DISCONTINUED | OUTPATIENT
Start: 2022-07-20 | End: 2022-07-20

## 2022-07-20 RX ORDER — ONDANSETRON 2 MG/ML
4 INJECTION INTRAMUSCULAR; INTRAVENOUS ONCE AS NEEDED
Status: DISCONTINUED | OUTPATIENT
Start: 2022-07-20 | End: 2022-07-21 | Stop reason: HOSPADM

## 2022-07-20 RX ORDER — SODIUM CHLORIDE 0.9 % (FLUSH) 0.9 %
10 SYRINGE (ML) INJECTION
Status: DISCONTINUED | OUTPATIENT
Start: 2022-07-20 | End: 2022-07-21 | Stop reason: HOSPADM

## 2022-07-20 RX ORDER — PROCHLORPERAZINE EDISYLATE 5 MG/ML
5 INJECTION INTRAMUSCULAR; INTRAVENOUS ONCE AS NEEDED
Status: DISCONTINUED | OUTPATIENT
Start: 2022-07-20 | End: 2022-07-21 | Stop reason: HOSPADM

## 2022-07-20 RX ORDER — ETOMIDATE 2 MG/ML
INJECTION INTRAVENOUS
Status: DISCONTINUED | OUTPATIENT
Start: 2022-07-20 | End: 2022-07-20

## 2022-07-20 RX ADMIN — PROPOFOL 30 MG: 10 INJECTION, EMULSION INTRAVENOUS at 08:07

## 2022-07-20 RX ADMIN — PROPOFOL 20 MG: 10 INJECTION, EMULSION INTRAVENOUS at 08:07

## 2022-07-20 RX ADMIN — PROPOFOL 50 MG: 10 INJECTION, EMULSION INTRAVENOUS at 08:07

## 2022-07-20 RX ADMIN — ETOMIDATE 4 MG: 20 INJECTION, SOLUTION INTRAVENOUS at 08:07

## 2022-07-20 RX ADMIN — SODIUM CHLORIDE: 9 INJECTION, SOLUTION INTRAVENOUS at 08:07

## 2022-07-20 RX ADMIN — LIDOCAINE HYDROCHLORIDE 100 MG: 20 INJECTION, SOLUTION INTRAVENOUS at 08:07

## 2022-07-20 NOTE — ANESTHESIA POSTPROCEDURE EVALUATION
Anesthesia Post Evaluation    Patient: Yaa Frank    Procedure(s) Performed: * No procedures listed *    Final Anesthesia Type: general      Patient location during evaluation: GI PACU  Patient participation: Yes- Able to Participate  Level of consciousness: awake and alert  Post-procedure vital signs: reviewed and stable  Pain management: adequate  Airway patency: patent    PONV status at discharge: No PONV  Anesthetic complications: no      Cardiovascular status: blood pressure returned to baseline and hemodynamically stable  Respiratory status: spontaneous ventilation and unassisted  Hydration status: euvolemic  Follow-up not needed.          Vitals Value Taken Time   /47 07/20/22 0838   Temp 36.1 °C (97 °F) 07/20/22 0838   Pulse 91 07/20/22 0838   Resp 40 07/20/22 0838   SpO2 96 % 07/20/22 0838         No case tracking events are documented in the log.      Pain/Julia Score: No data recorded

## 2022-07-20 NOTE — ANESTHESIA PREPROCEDURE EVALUATION
07/20/2022  Yaa Frank is a 52 y.o., female.      Pre-op Assessment    I have reviewed the Patient Summary Reports.     I have reviewed the Nursing Notes. I have reviewed the NPO Status.   I have reviewed the Medications.     Review of Systems  Anesthesia Hx:  No problems with previous Anesthesia    Cardiovascular:   Hypertension CHF    Pulmonary:   Asthma    Renal/:   Chronic Renal Disease    Endocrine:  Metabolic Disorders, Obesity / BMI > 30    Past Medical History:   Diagnosis Date    Asthma     COVID-19     Disorder of kidney and ureter        Physical Exam  General: Well nourished, Cooperative, Alert and Oriented    Airway:  Mallampati: III       Chest/Lungs:  Clear to auscultation    Heart:  Rate: Normal        Anesthesia Plan  Type of Anesthesia, risks & benefits discussed:    Anesthesia Type: Gen Natural Airway, MAC  Intra-op Monitoring Plan: Standard ASA Monitors  Post Op Pain Control Plan: multimodal analgesia and IV/PO Opioids PRN  Induction:  IV  Informed Consent: Informed consent signed with the Patient and all parties understand the risks and agree with anesthesia plan.  All questions answered. Patient consented to blood products? Yes  ASA Score: 3  Day of Surgery Review of History & Physical: I have interviewed and examined the patient. I have reviewed the patient's H&P dated: There are no significant changes.     Ready For Surgery From Anesthesia Perspective.     .

## 2022-07-20 NOTE — DISCHARGE INSTRUCTIONS
Procedure Date  7/20/22     Impression  Overall Impression: Moderate sigmoid diverticulosis.      Recommendation  Follow up with PCP  Repeat screening colonoscopy in 10 years   NO DRIVING, OPERATING EQUIPMENT, OR SIGNING LEGAL DOCUMENTS FOR 24 HOURS.

## 2022-07-20 NOTE — TRANSFER OF CARE
"Anesthesia Transfer of Care Note    Patient: Yaa Frank    Procedure(s) Performed: * No procedures listed *    Patient location: GI    Anesthesia Type: general    Transport from OR: Transported from OR on room air with adequate spontaneous ventilation    Post pain: adequate analgesia    Post assessment: no apparent anesthetic complications    Post vital signs: stable    Level of consciousness: responds to stimulation    Nausea/Vomiting: no nausea/vomiting    Complications: none    Transfer of care protocol was followed      Last vitals:   Visit Vitals  BP (!) 125/47 (BP Location: Left leg, Patient Position: Lying)   Pulse 91   Temp 36.1 °C (97 °F) (Oral)   Resp (!) 40   Ht 5' 2" (1.575 m)   Wt (!) 142.4 kg (314 lb)   SpO2 96%   Breastfeeding No   BMI 57.43 kg/m²     "

## 2022-07-20 NOTE — H&P
History of Present Illness    Yaa Frank is a 52 y.o. female that  has a past medical history of Asthma, COVID-19, and Disorder of kidney and ureter.     On Protonix.  Hgb 13.2.    ESRD 2/2 HTN, on iHD last yesterday with heparin.   States has history of CHF, last EF was 37% last year per patinent.  Also has history of uncontrolled asthma.  Obesity.    Here for index colonoscopy.    Patient otherwise denies any:  - black stools  - bloody stools  - nausea  - vomiting  - diarrhea  - constipation  - abdominal pain  - family history of GI related malignancies    ROS  - 12 point review of systems is negative except as otherwise stated in HPI.    Past Medical History:   Diagnosis Date    Asthma     COVID-19     Disorder of kidney and ureter        Past Surgical History:   Procedure Laterality Date    APPENDECTOMY       SECTION      CHOLECYSTECTOMY      LAPAROSCOPIC GASTRIC BANDING      PLACEMENT OF DIALYSIS ACCESS Left        Family History   Problem Relation Age of Onset    Breast cancer Paternal Aunt        Social History     Socioeconomic History    Marital status: Single   Tobacco Use    Smoking status: Never Smoker    Smokeless tobacco: Never Used   Substance and Sexual Activity    Alcohol use: Not Currently    Drug use: Never    Sexual activity: Not Currently       Current Outpatient Medications   Medication Sig Dispense Refill    albuterol (PROVENTIL/VENTOLIN HFA) 90 mcg/actuation inhaler Inhale 2 puffs into the lungs every 4 to 6 hours as needed.      amoxicillin-clavulanate 500-125mg (AUGMENTIN) 500-125 mg Tab Take on Tuesday, Thursday and Saturday after dialysis for a total of 5 doses. (Patient not taking: Reported on 5/10/2022) 5 tablet 0    carvediloL (COREG) 3.125 MG tablet Take 3.125 mg by mouth 2 (two) times a day.      cetirizine (ZYRTEC) 5 MG chewable tablet Take 1 tablet (5 mg total) by mouth once daily. 30 tablet 11    cinacalcet (SENSIPAR) 30 MG Tab Take 30 mg by mouth  "every evening.      ciprofloxacin-dexamethasone 0.3-0.1% (CIPRODEX) 0.3-0.1 % DrpS Place 4 drops into both ears 2 (two) times daily. 7.5 mL 0    gabapentin (NEURONTIN) 100 MG capsule Take 1 capsule (100 mg total) by mouth 2 (two) times daily. 60 capsule 2    midodrine (PROAMATINE) 5 MG Tab Take 5 mg by mouth 2 (two) times a day.      pantoprazole (PROTONIX) 40 MG tablet Take 40 mg by mouth once daily.      sevelamer carbonate (RENVELA) 800 mg Tab Take 800 mg by mouth 4 (four) times daily with meals and nightly. 4 TABLETS WITH EACH MEAL. 2 WITH EACH SNACK.      simvastatin (ZOCOR) 20 MG tablet Take 1 tablet (20 mg total) by mouth every evening. 90 tablet 3    WIXELA INHUB 250-50 mcg/dose diskus inhaler Inhale 1 puff into the lungs 2 (two) times a day.       No current facility-administered medications for this encounter.       Review of patient's allergies indicates:   Allergen Reactions    Ancef [cefazolin]     Hydralazine     Hydralazine analogues     Latex, natural rubber        Objective:  Vitals:    07/20/22 0750   Temp: 97.6 °F (36.4 °C)   TempSrc: Temporal   Weight: (!) 142.4 kg (314 lb)   Height: 5' 2" (1.575 m)        Constitutional:       General: no acute distress.     Appearance: Normal appearance. Non toxic-appearing.   HENT:      Head: Normocephalic and atraumatic.      Mouth/Throat:      Pharynx: Oropharynx is clear. No posterior oropharyngeal erythema.   Eyes:      General: No scleral icterus.     Conjunctiva/sclera: Conjunctivae normal.   Cardiovascular:      Rate and Rhythm: Normal rate and regular rhythm.      Heart sounds: Normal heart sounds.   Pulmonary:      Effort: Pulmonary effort is normal.      Breath sounds: Normal breath sounds.   Abdominal:      General: Abdomen is flat. There is no distension.      Palpations: Abdomen is soft. There is no mass.      Tenderness: There is no abdominal tenderness. There is no guarding.   Musculoskeletal:         General: No swelling or " deformity.      Cervical back: Normal range of motion and neck supple.   Skin:     General: Skin is warm and dry.   Neurological:      General: No focal deficit present.      Mental Status: alert and oriented to person, place, and time.     Assessment and Plan:  Proceed with:  Colonoscopy for screening for colon cancer    Jean Almanza MD  Gastroenterology

## 2022-08-15 PROBLEM — Z00.00 ENCOUNTER FOR PREVENTIVE HEALTH EXAMINATION: Status: RESOLVED | Noted: 2022-05-13 | Resolved: 2022-08-15

## 2022-11-09 DIAGNOSIS — Z71.89 COMPLEX CARE COORDINATION: ICD-10-CM

## 2022-12-21 ENCOUNTER — OFFICE VISIT (OUTPATIENT)
Dept: FAMILY MEDICINE | Facility: CLINIC | Age: 52
End: 2022-12-21
Payer: MEDICARE

## 2022-12-21 VITALS
WEIGHT: 293 LBS | BODY MASS INDEX: 53.92 KG/M2 | DIASTOLIC BLOOD PRESSURE: 89 MMHG | HEIGHT: 62 IN | RESPIRATION RATE: 18 BRPM | SYSTOLIC BLOOD PRESSURE: 151 MMHG | HEART RATE: 94 BPM

## 2022-12-21 DIAGNOSIS — E53.8 VITAMIN B12 DEFICIENCY: ICD-10-CM

## 2022-12-21 DIAGNOSIS — I10 HYPERTENSION, UNSPECIFIED TYPE: Primary | ICD-10-CM

## 2022-12-21 DIAGNOSIS — Z11.59 SCREENING FOR VIRAL DISEASE: ICD-10-CM

## 2022-12-21 DIAGNOSIS — R53.83 FATIGUE, UNSPECIFIED TYPE: ICD-10-CM

## 2022-12-21 DIAGNOSIS — E55.9 VITAMIN D DEFICIENCY: ICD-10-CM

## 2022-12-21 DIAGNOSIS — R73.01 IMPAIRED FASTING GLUCOSE: ICD-10-CM

## 2022-12-21 DIAGNOSIS — E78.5 HYPERLIPIDEMIA, UNSPECIFIED HYPERLIPIDEMIA TYPE: ICD-10-CM

## 2022-12-21 PROBLEM — E53.9 VITAMIN B DEFICIENCY: Status: ACTIVE | Noted: 2022-12-21

## 2022-12-21 LAB
25(OH)D3 SERPL-MCNC: 9.5 NG/ML
ALBUMIN SERPL BCP-MCNC: 3.7 G/DL (ref 3.5–5)
ALBUMIN/GLOB SERPL: 0.9 {RATIO}
ALP SERPL-CCNC: 92 U/L (ref 41–108)
ALT SERPL W P-5'-P-CCNC: 14 U/L (ref 13–56)
ANION GAP SERPL CALCULATED.3IONS-SCNC: 10 MMOL/L (ref 7–16)
AST SERPL W P-5'-P-CCNC: 12 U/L (ref 15–37)
BASOPHILS # BLD AUTO: 0.05 K/UL (ref 0–0.2)
BASOPHILS NFR BLD AUTO: 1 % (ref 0–1)
BILIRUB SERPL-MCNC: 0.5 MG/DL (ref ?–1.2)
BUN SERPL-MCNC: 38 MG/DL (ref 7–18)
BUN/CREAT SERPL: 3 (ref 6–20)
CALCIUM SERPL-MCNC: 9.1 MG/DL (ref 8.5–10.1)
CHLORIDE SERPL-SCNC: 96 MMOL/L (ref 98–107)
CHOLEST SERPL-MCNC: 135 MG/DL (ref 0–200)
CHOLEST/HDLC SERPL: 2.8 {RATIO}
CO2 SERPL-SCNC: 37 MMOL/L (ref 21–32)
CREAT SERPL-MCNC: 11 MG/DL (ref 0.55–1.02)
DIFFERENTIAL METHOD BLD: ABNORMAL
EGFR (NO RACE VARIABLE) (RUSH/TITUS): 4 ML/MIN/1.73M²
EOSINOPHIL # BLD AUTO: 0.16 K/UL (ref 0–0.5)
EOSINOPHIL NFR BLD AUTO: 3.3 % (ref 1–4)
ERYTHROCYTE [DISTWIDTH] IN BLOOD BY AUTOMATED COUNT: 13 % (ref 11.5–14.5)
EST. AVERAGE GLUCOSE BLD GHB EST-MCNC: 90 MG/DL
FOLATE SERPL-MCNC: 3.9 NG/ML (ref 3.1–17.5)
GLOBULIN SER-MCNC: 4 G/DL (ref 2–4)
GLUCOSE SERPL-MCNC: 88 MG/DL (ref 74–106)
HBA1C MFR BLD HPLC: 5.3 % (ref 4.5–6.6)
HCT VFR BLD AUTO: 40 % (ref 38–47)
HCV AB SER QL: NORMAL
HDLC SERPL-MCNC: 49 MG/DL (ref 40–60)
HGB BLD-MCNC: 12.1 G/DL (ref 12–16)
HIV 1+O+2 AB SERPL QL: NORMAL
IMM GRANULOCYTES # BLD AUTO: 0.01 K/UL (ref 0–0.04)
IMM GRANULOCYTES NFR BLD: 0.2 % (ref 0–0.4)
LDLC SERPL CALC-MCNC: 66 MG/DL
LDLC/HDLC SERPL: 1.3 {RATIO}
LYMPHOCYTES # BLD AUTO: 1.17 K/UL (ref 1–4.8)
LYMPHOCYTES NFR BLD AUTO: 24.4 % (ref 27–41)
MCH RBC QN AUTO: 29.3 PG (ref 27–31)
MCHC RBC AUTO-ENTMCNC: 30.3 G/DL (ref 32–36)
MCV RBC AUTO: 96.9 FL (ref 80–96)
MONOCYTES # BLD AUTO: 0.42 K/UL (ref 0–0.8)
MONOCYTES NFR BLD AUTO: 8.8 % (ref 2–6)
MPC BLD CALC-MCNC: 10.4 FL (ref 9.4–12.4)
NEUTROPHILS # BLD AUTO: 2.98 K/UL (ref 1.8–7.7)
NEUTROPHILS NFR BLD AUTO: 62.3 % (ref 53–65)
NONHDLC SERPL-MCNC: 86 MG/DL
NRBC # BLD AUTO: 0 X10E3/UL
NRBC, AUTO (.00): 0 %
PLATELET # BLD AUTO: 227 K/UL (ref 150–400)
POTASSIUM SERPL-SCNC: 4.6 MMOL/L (ref 3.5–5.1)
PROT SERPL-MCNC: 7.7 G/DL (ref 6.4–8.2)
RBC # BLD AUTO: 4.13 M/UL (ref 4.2–5.4)
SODIUM SERPL-SCNC: 138 MMOL/L (ref 136–145)
T4 FREE SERPL-MCNC: 1.05 NG/DL (ref 0.76–1.46)
TRIGL SERPL-MCNC: 101 MG/DL (ref 35–150)
TSH SERPL DL<=0.005 MIU/L-ACNC: 6.82 UIU/ML (ref 0.36–3.74)
VIT B12 SERPL-MCNC: 355 PG/ML (ref 193–986)
VLDLC SERPL-MCNC: 20 MG/DL
WBC # BLD AUTO: 4.79 K/UL (ref 4.5–11)

## 2022-12-21 PROCEDURE — 84439 THYROID PANEL: ICD-10-PCS | Mod: ,,, | Performed by: CLINICAL MEDICAL LABORATORY

## 2022-12-21 PROCEDURE — 99213 OFFICE O/P EST LOW 20 MIN: CPT | Mod: ,,, | Performed by: NURSE PRACTITIONER

## 2022-12-21 PROCEDURE — 82306 VITAMIN D 25 HYDROXY: CPT | Mod: ,,, | Performed by: CLINICAL MEDICAL LABORATORY

## 2022-12-21 PROCEDURE — 87389 HIV-1 AG W/HIV-1&-2 AB AG IA: CPT | Mod: ,,, | Performed by: CLINICAL MEDICAL LABORATORY

## 2022-12-21 PROCEDURE — 3008F BODY MASS INDEX DOCD: CPT | Mod: ,,, | Performed by: NURSE PRACTITIONER

## 2022-12-21 PROCEDURE — 3077F PR MOST RECENT SYSTOLIC BLOOD PRESSURE >= 140 MM HG: ICD-10-PCS | Mod: ,,, | Performed by: NURSE PRACTITIONER

## 2022-12-21 PROCEDURE — 82607 VITAMIN B12/FOLATE, SERUM PANEL: ICD-10-PCS | Mod: ,,, | Performed by: CLINICAL MEDICAL LABORATORY

## 2022-12-21 PROCEDURE — 1160F RVW MEDS BY RX/DR IN RCRD: CPT | Mod: ,,, | Performed by: NURSE PRACTITIONER

## 2022-12-21 PROCEDURE — 3079F DIAST BP 80-89 MM HG: CPT | Mod: ,,, | Performed by: NURSE PRACTITIONER

## 2022-12-21 PROCEDURE — 86803 HEPATITIS C ANTIBODY: ICD-10-PCS | Mod: ,,, | Performed by: CLINICAL MEDICAL LABORATORY

## 2022-12-21 PROCEDURE — 1159F MED LIST DOCD IN RCRD: CPT | Mod: ,,, | Performed by: NURSE PRACTITIONER

## 2022-12-21 PROCEDURE — 82746 VITAMIN B12/FOLATE, SERUM PANEL: ICD-10-PCS | Mod: ,,, | Performed by: CLINICAL MEDICAL LABORATORY

## 2022-12-21 PROCEDURE — 80061 LIPID PANEL: CPT | Mod: ,,, | Performed by: CLINICAL MEDICAL LABORATORY

## 2022-12-21 PROCEDURE — 82746 ASSAY OF FOLIC ACID SERUM: CPT | Mod: ,,, | Performed by: CLINICAL MEDICAL LABORATORY

## 2022-12-21 PROCEDURE — 87389 HIV 1 / 2 ANTIBODY: ICD-10-PCS | Mod: ,,, | Performed by: CLINICAL MEDICAL LABORATORY

## 2022-12-21 PROCEDURE — 80050 GENERAL HEALTH PANEL: CPT | Mod: ,,, | Performed by: CLINICAL MEDICAL LABORATORY

## 2022-12-21 PROCEDURE — 3077F SYST BP >= 140 MM HG: CPT | Mod: ,,, | Performed by: NURSE PRACTITIONER

## 2022-12-21 PROCEDURE — 80061 LIPID PANEL: ICD-10-PCS | Mod: ,,, | Performed by: CLINICAL MEDICAL LABORATORY

## 2022-12-21 PROCEDURE — 1159F PR MEDICATION LIST DOCUMENTED IN MEDICAL RECORD: ICD-10-PCS | Mod: ,,, | Performed by: NURSE PRACTITIONER

## 2022-12-21 PROCEDURE — 82607 VITAMIN B-12: CPT | Mod: ,,, | Performed by: CLINICAL MEDICAL LABORATORY

## 2022-12-21 PROCEDURE — 1160F PR REVIEW ALL MEDS BY PRESCRIBER/CLIN PHARMACIST DOCUMENTED: ICD-10-PCS | Mod: ,,, | Performed by: NURSE PRACTITIONER

## 2022-12-21 PROCEDURE — 84439 ASSAY OF FREE THYROXINE: CPT | Mod: ,,, | Performed by: CLINICAL MEDICAL LABORATORY

## 2022-12-21 PROCEDURE — 83036 HEMOGLOBIN A1C: ICD-10-PCS | Mod: ,,, | Performed by: CLINICAL MEDICAL LABORATORY

## 2022-12-21 PROCEDURE — 86803 HEPATITIS C AB TEST: CPT | Mod: ,,, | Performed by: CLINICAL MEDICAL LABORATORY

## 2022-12-21 PROCEDURE — 99213 PR OFFICE/OUTPT VISIT, EST, LEVL III, 20-29 MIN: ICD-10-PCS | Mod: ,,, | Performed by: NURSE PRACTITIONER

## 2022-12-21 PROCEDURE — 3079F PR MOST RECENT DIASTOLIC BLOOD PRESSURE 80-89 MM HG: ICD-10-PCS | Mod: ,,, | Performed by: NURSE PRACTITIONER

## 2022-12-21 PROCEDURE — 80050 PR  GENERAL HEALTH PANEL: ICD-10-PCS | Mod: ,,, | Performed by: CLINICAL MEDICAL LABORATORY

## 2022-12-21 PROCEDURE — 83036 HEMOGLOBIN GLYCOSYLATED A1C: CPT | Mod: ,,, | Performed by: CLINICAL MEDICAL LABORATORY

## 2022-12-21 PROCEDURE — 82306 VITAMIN D: ICD-10-PCS | Mod: ,,, | Performed by: CLINICAL MEDICAL LABORATORY

## 2022-12-21 PROCEDURE — 3008F PR BODY MASS INDEX (BMI) DOCUMENTED: ICD-10-PCS | Mod: ,,, | Performed by: NURSE PRACTITIONER

## 2022-12-21 RX ORDER — AMOXICILLIN 500 MG/1
CAPSULE ORAL
COMMUNITY
Start: 2022-07-13

## 2022-12-21 RX ORDER — GABAPENTIN 100 MG/1
100 CAPSULE ORAL 2 TIMES DAILY
Qty: 180 CAPSULE | Refills: 1 | Status: SHIPPED | OUTPATIENT
Start: 2022-12-21 | End: 2023-06-27 | Stop reason: SDUPTHER

## 2022-12-21 RX ORDER — CIPROFLOXACIN AND DEXAMETHASONE 3; 1 MG/ML; MG/ML
4 SUSPENSION/ DROPS AURICULAR (OTIC) 2 TIMES DAILY
Qty: 7.5 ML | Refills: 0 | Status: SHIPPED | OUTPATIENT
Start: 2022-12-21 | End: 2023-06-27 | Stop reason: ALTCHOICE

## 2022-12-21 NOTE — PROGRESS NOTES
Lluvia Stafford NP   [address] [place] [address]     PATIENT NAME: Yaa Frank  : 1970  DATE: 22  MRN: [id number]      Billing Provider: Lluvia Stafford NP  Level of Service: [place] OFFICE/OUTPT VISIT, EST, LEVL III, 20-29 MIN  Patient PCP Information       Provider PCP Type    JASON [facility]            Reason for Visit / Chief Complaint: Hypertension and Hyperlipidemia       Update PCP  Update Chief Complaint         History of Present Illness / Problem Focused Workflow     Yaa Frank presents to the clinic with Hypertension and Hyperlipidemia     Patient is a 52 year old female to the clinic for annual exam. She denies any complaints at this time. She is due medication refills and labs. She had PAP 2021; Colonoscopy 2022, Mammogram 2022 -- scheduled for 2023    Hypertension    Hyperlipidemia      Review of Systems     Review of Systems     Medical / Social / Family History     Past Medical History:   Diagnosis Date    Asthma     COVID-19     Disorder of kidney and ureter        Past Surgical History:   Procedure Laterality Date    APPENDECTOMY       SECTION      CHOLECYSTECTOMY      LAPAROSCOPIC GASTRIC BANDING      PLACEMENT OF DIALYSIS ACCESS Left        Social History  Ms.  reports that she has never smoked. She has never used smokeless tobacco. She reports that she does not currently use alcohol. She reports that she does not use drugs.    Family History  Ms.'s family history includes Breast cancer in her paternal aunt.    Medications and Allergies     Medications  Outpatient Medications Marked as Taking for the 22 encounter (Office Visit) with Lluvia Stafford NP   Medication Sig Dispense Refill    albuterol (PROVENTIL/VENTOLIN HFA) 90 mcg/actuation inhaler Inhale 2 puffs into the lungs every 4 to 6 hours as needed.      carvediloL (COREG) 3.125 MG tablet Take 3.125 mg by mouth 2 (two) times a day.       "cetirizine (ZYRTEC) 5 MG chewable tablet Take 1 tablet (5 mg total) by mouth once daily. 30 tablet 11    cinacalcet (SENSIPAR) 30 MG Tab Take 30 mg by mouth every evening.      gabapentin (NEURONTIN) 100 MG capsule TAKE 1 CAPSULE TWICE DAILY 60 capsule 0    midodrine (PROAMATINE) 5 MG Tab Take 5 mg by mouth 2 (two) times a day. Only take on dialysis days      pantoprazole (PROTONIX) 40 MG tablet Take 40 mg by mouth once daily.      sevelamer carbonate (RENVELA) 800 mg Tab Take 800 mg by mouth 4 (four) times daily with meals and nightly. 4 TABLETS WITH EACH MEAL. 2 WITH EACH SNACK.      simvastatin (ZOCOR) 20 MG tablet TAKE 1 TABLET EVERY EVENING 90 tablet 3    WIXELA INHUB 250-50 mcg/dose diskus inhaler Inhale 1 puff into the lungs 2 (two) times a day.         Allergies  Review of patient's allergies indicates:   Allergen Reactions    Ancef [cefazolin]     Hydralazine     Hydralazine analogues     Latex, natural rubber        Physical Examination   BP (!) 151/89 (BP Location: Left arm, Patient Position: Sitting, BP Method: Medium (Automatic))   Pulse 94   Resp 18   Ht 5' 2" (1.575 m)   Wt (!) 142 kg (313 lb)   BMI 57.25 kg/m²    Physical Exam  Vitals and nursing note reviewed.   Constitutional:       Appearance: Normal appearance. She is obese.   HENT:      Head: Normocephalic.      Right Ear: Tympanic membrane normal.      Left Ear: Tympanic membrane normal.      Nose: Nose normal.      Mouth/Throat:      Mouth: Mucous membranes are moist.      Pharynx: Oropharynx is clear.   Eyes:      Conjunctiva/sclera: Conjunctivae normal.      Pupils: Pupils are equal, round, and reactive to light.   Cardiovascular:      Rate and Rhythm: Normal rate and regular rhythm.      Pulses: Normal pulses.      Heart sounds: Normal heart sounds.   Pulmonary:      Effort: Pulmonary effort is normal.      Breath sounds: Normal breath sounds.   Abdominal:      General: Bowel sounds are normal.      Palpations: Abdomen is soft. "   Musculoskeletal:         General: Normal range of motion.      Cervical back: Normal range of motion and neck supple.   Skin:     General: Skin is warm.      Capillary Refill: Capillary refill takes less than 2 seconds.   Neurological:      General: No focal deficit present.      Mental Status: She is alert and oriented to person, place, and time.   Psychiatric:         Mood and Affect: Mood normal.         Thought Content: Thought content normal.        Assessment and Plan (including Health Maintenance)      Problem List  Smart Sets  Document Outside HM   :    Plan:         Health Maintenance Due   Topic Date Due    Hepatitis C Screening  Never done    HIV Screening  Never done    Hemoglobin A1c (Prediabetes)  06/23/2022       Problem List Items Addressed This Visit          Cardiac/Vascular    Hypertension - Primary    Relevant Orders    CBC Auto Differential    Comprehensive Metabolic Panel    Hyperlipidemia    Relevant Orders    Lipid Panel       ID    Screening for viral disease    Relevant Orders    Hepatitis C Antibody    HIV 1/2 Ag/Ab (4th Gen)       Endocrine    Vitamin D deficiency    Relevant Orders    Vitamin D    Vitamin B12 deficiency    Relevant Orders    Vitamin B12 & Folate    Impaired fasting glucose    Relevant Orders    Microalbumin/Creatinine Ratio, Urine    Hemoglobin A1C    BMI 50.0-59.9, adult       Other    Fatigue    Relevant Orders    Thyroid Panel       Health Maintenance Topics with due status: Not Due       Topic Last Completion Date    TETANUS VACCINE 12/27/2012    Lipid Panel 06/23/2021    Cervical Cancer Screening 08/09/2021    Mammogram 03/16/2022    Colorectal Cancer Screening 07/20/2022    COVID-19 Vaccine 11/29/2022       Future Appointments   Date Time Provider Department Center   3/22/2023 11:00 AM [facility]   5/15/2023 10:00 AM [facility] NURSE, [facility] AMAYA Wharton            Signature:  Lluvia Stafford NP       [address] [place] [address]    Date of encounter: 12/21/22

## 2023-01-25 ENCOUNTER — OFFICE VISIT (OUTPATIENT)
Dept: FAMILY MEDICINE | Facility: CLINIC | Age: 53
End: 2023-01-25
Payer: MEDICARE

## 2023-01-25 VITALS
TEMPERATURE: 98 F | DIASTOLIC BLOOD PRESSURE: 86 MMHG | HEIGHT: 62 IN | HEART RATE: 93 BPM | SYSTOLIC BLOOD PRESSURE: 129 MMHG | OXYGEN SATURATION: 95 % | BODY MASS INDEX: 53.92 KG/M2 | RESPIRATION RATE: 18 BRPM | WEIGHT: 293 LBS

## 2023-01-25 DIAGNOSIS — J45.50 SEVERE PERSISTENT ASTHMA WITHOUT COMPLICATION: ICD-10-CM

## 2023-01-25 DIAGNOSIS — I73.9 PERIPHERAL VASCULAR DISEASE, UNSPECIFIED: ICD-10-CM

## 2023-01-25 DIAGNOSIS — E66.01 MORBID OBESITY: ICD-10-CM

## 2023-01-25 DIAGNOSIS — I10 ESSENTIAL (PRIMARY) HYPERTENSION: Primary | ICD-10-CM

## 2023-01-25 DIAGNOSIS — N18.6 END STAGE RENAL DISEASE: ICD-10-CM

## 2023-01-25 DIAGNOSIS — I50.9 CHRONIC HEART FAILURE, UNSPECIFIED HEART FAILURE TYPE: ICD-10-CM

## 2023-01-25 PROCEDURE — 99213 OFFICE O/P EST LOW 20 MIN: CPT | Mod: ,,, | Performed by: NURSE PRACTITIONER

## 2023-01-25 PROCEDURE — 3079F PR MOST RECENT DIASTOLIC BLOOD PRESSURE 80-89 MM HG: ICD-10-PCS | Mod: ,,, | Performed by: NURSE PRACTITIONER

## 2023-01-25 PROCEDURE — 3079F DIAST BP 80-89 MM HG: CPT | Mod: ,,, | Performed by: NURSE PRACTITIONER

## 2023-01-25 PROCEDURE — 3074F PR MOST RECENT SYSTOLIC BLOOD PRESSURE < 130 MM HG: ICD-10-PCS | Mod: ,,, | Performed by: NURSE PRACTITIONER

## 2023-01-25 PROCEDURE — 3008F PR BODY MASS INDEX (BMI) DOCUMENTED: ICD-10-PCS | Mod: ,,, | Performed by: NURSE PRACTITIONER

## 2023-01-25 PROCEDURE — 1160F PR REVIEW ALL MEDS BY PRESCRIBER/CLIN PHARMACIST DOCUMENTED: ICD-10-PCS | Mod: ,,, | Performed by: NURSE PRACTITIONER

## 2023-01-25 PROCEDURE — 1160F RVW MEDS BY RX/DR IN RCRD: CPT | Mod: ,,, | Performed by: NURSE PRACTITIONER

## 2023-01-25 PROCEDURE — 1159F MED LIST DOCD IN RCRD: CPT | Mod: ,,, | Performed by: NURSE PRACTITIONER

## 2023-01-25 PROCEDURE — 3074F SYST BP LT 130 MM HG: CPT | Mod: ,,, | Performed by: NURSE PRACTITIONER

## 2023-01-25 PROCEDURE — 1159F PR MEDICATION LIST DOCUMENTED IN MEDICAL RECORD: ICD-10-PCS | Mod: ,,, | Performed by: NURSE PRACTITIONER

## 2023-01-25 PROCEDURE — 3008F BODY MASS INDEX DOCD: CPT | Mod: ,,, | Performed by: NURSE PRACTITIONER

## 2023-01-25 PROCEDURE — 99213 PR OFFICE/OUTPT VISIT, EST, LEVL III, 20-29 MIN: ICD-10-PCS | Mod: ,,, | Performed by: NURSE PRACTITIONER

## 2023-01-25 NOTE — PROGRESS NOTES
Lluvia Stafford NP   [address] [place] [address]     PATIENT NAME: Yaa Frank  : 1970  DATE: 23  MRN: [id number]      Billing Provider: Lluvia Stafford NP  Level of Service: [place] OFFICE/OUTPT VISIT, EST, LEVL III, 20-29 MIN  Patient PCP Information       Provider PCP Type    Lluvia Stafford NP General            Reason for Visit / Chief Complaint: Hospital Follow Up (Left finger had an ingrown fingernail)       Update PCP  Update Chief Complaint         History of Present Illness / Problem Focused Workflow     Yaa Frank presents to the clinic with Hospital Follow Up (Left finger had an ingrown fingernail)     Patient is a 52 year old female presents to the clinic for hospital followup. Patient reports she was hospitalized from -- at [facility]. Records sent w/ patient reviewed. She reports she is doing overall well and is not having any concerns. She has f/u appt scheduled w/ pulmonary 2023. She is a dialysis pt scheduled on T/TH/Sat -- reports she doesn't miss. She has portable O2 machine -- needs refilling and she reports she will stop by [email or web address] in [place], [place] today to refill.       Review of Systems     Review of Systems   All other systems reviewed and are negative.     Medical / Social / Family History     Past Medical History:   Diagnosis Date    Asthma     COVID-19     Disorder of kidney and ureter        Past Surgical History:   Procedure Laterality Date    APPENDECTOMY       SECTION      CHOLECYSTECTOMY      LAPAROSCOPIC GASTRIC BANDING      PLACEMENT OF DIALYSIS ACCESS Left        Social History  Ms.  reports that she has never smoked. She has never used smokeless tobacco. She reports that she does not currently use alcohol. She reports that she does not use drugs.    Family History  Ms.'s family history includes Breast cancer in her paternal aunt.    Medications and Allergies     Medications  No  "outpatient medications have been marked as taking for the 1/25/23 encounter (Office Visit) with Lluvia Stafford NP.       Allergies  Review of patient's allergies indicates:   Allergen Reactions    Ancef [cefazolin]     Hydralazine     Hydralazine analogues     Latex, natural rubber        Physical Examination   /86 (BP Location: Left arm, Patient Position: Sitting, BP Method: Large (Automatic))   Pulse 93   Temp 98.1 °F (36.7 °C) (Oral)   Resp 18   Ht 5' 2" (1.575 m)   Wt (!) 139.7 kg (308 lb)   SpO2 95%   BMI 56.33 kg/m²    Physical Exam  Vitals and nursing note reviewed.   Constitutional:       Appearance: Normal appearance. She is obese.   HENT:      Head: Normocephalic.      Right Ear: Tympanic membrane normal.      Left Ear: Tympanic membrane normal.      Nose: Nose normal.      Mouth/Throat:      Mouth: Mucous membranes are moist.      Pharynx: Oropharynx is clear.   Eyes:      Conjunctiva/sclera: Conjunctivae normal.      Pupils: Pupils are equal, round, and reactive to light.   Cardiovascular:      Rate and Rhythm: Normal rate and regular rhythm.      Pulses: Normal pulses.      Heart sounds: Normal heart sounds.   Pulmonary:      Effort: Pulmonary effort is normal.      Breath sounds: Normal breath sounds.   Abdominal:      General: Bowel sounds are normal.      Palpations: Abdomen is soft.   Musculoskeletal:         General: Normal range of motion.      Cervical back: Normal range of motion and neck supple.   Skin:     General: Skin is warm.      Capillary Refill: Capillary refill takes less than 2 seconds.   Neurological:      General: No focal deficit present.      Mental Status: She is alert and oriented to person, place, and time.   Psychiatric:         Mood and Affect: Mood normal.         Thought Content: Thought content normal.        Assessment and Plan (including Health Maintenance)      Problem List  Smart Sets  Document Outside HM   :    Plan:         Health Maintenance " Due   Topic Date Due    TETANUS VACCINE  12/27/2022    COVID-19 Vaccine (4 - Booster for Andree series) 01/24/2023    Mammogram  03/16/2023       Problem List Items Addressed This Visit          Pulmonary    Severe persistent asthma without complication       Cardiac/Vascular    Peripheral vascular disease, unspecified    Heart failure    Essential (primary) hypertension - Primary       Renal/    End stage renal disease       Endocrine    Morbid obesity    BMI 50.0-59.9, adult       Health Maintenance Topics with due status: Not Due       Topic Last Completion Date    Cervical Cancer Screening 08/09/2021    Colorectal Cancer Screening 07/20/2022    Hemoglobin A1c (Prediabetes) 12/21/2022    Lipid Panel 12/21/2022       Future Appointments   Date Time Provider Department Center   3/22/2023 11:00 AM [facility]1 [facility]   5/15/2023 10:00 AM [facility] NURSE, [facility]IC[facility] AMAYA Wharton            Signature:  Lluvia Stafford, ANGELA      [address] [place] [address]    Date of encounter: 1/25/23

## 2023-02-01 ENCOUNTER — OFFICE VISIT (OUTPATIENT)
Dept: FAMILY MEDICINE | Facility: CLINIC | Age: 53
End: 2023-02-01
Payer: MEDICARE

## 2023-02-01 VITALS
DIASTOLIC BLOOD PRESSURE: 83 MMHG | TEMPERATURE: 98 F | SYSTOLIC BLOOD PRESSURE: 136 MMHG | WEIGHT: 293 LBS | BODY MASS INDEX: 53.92 KG/M2 | HEART RATE: 83 BPM | HEIGHT: 62 IN | RESPIRATION RATE: 20 BRPM | OXYGEN SATURATION: 85 %

## 2023-02-01 DIAGNOSIS — Z20.822 EXPOSURE TO COVID-19 VIRUS: Primary | ICD-10-CM

## 2023-02-01 LAB
CTP QC/QA: YES
SARS-COV-2 AG RESP QL IA.RAPID: NEGATIVE

## 2023-02-01 PROCEDURE — 99212 PR OFFICE/OUTPT VISIT, EST, LEVL II, 10-19 MIN: ICD-10-PCS | Mod: ,,, | Performed by: NURSE PRACTITIONER

## 2023-02-01 PROCEDURE — 3079F PR MOST RECENT DIASTOLIC BLOOD PRESSURE 80-89 MM HG: ICD-10-PCS | Mod: ,,, | Performed by: NURSE PRACTITIONER

## 2023-02-01 PROCEDURE — 3008F PR BODY MASS INDEX (BMI) DOCUMENTED: ICD-10-PCS | Mod: ,,, | Performed by: NURSE PRACTITIONER

## 2023-02-01 PROCEDURE — 87426 SARSCOV CORONAVIRUS AG IA: CPT | Mod: QW,,, | Performed by: NURSE PRACTITIONER

## 2023-02-01 PROCEDURE — 3075F PR MOST RECENT SYSTOLIC BLOOD PRESS GE 130-139MM HG: ICD-10-PCS | Mod: ,,, | Performed by: NURSE PRACTITIONER

## 2023-02-01 PROCEDURE — 99212 OFFICE O/P EST SF 10 MIN: CPT | Mod: ,,, | Performed by: NURSE PRACTITIONER

## 2023-02-01 PROCEDURE — 87426 SARS CORONAVIRUS 2 ANTIGEN POCT: ICD-10-PCS | Mod: QW,,, | Performed by: NURSE PRACTITIONER

## 2023-02-01 PROCEDURE — 1159F MED LIST DOCD IN RCRD: CPT | Mod: ,,, | Performed by: NURSE PRACTITIONER

## 2023-02-01 PROCEDURE — 1159F PR MEDICATION LIST DOCUMENTED IN MEDICAL RECORD: ICD-10-PCS | Mod: ,,, | Performed by: NURSE PRACTITIONER

## 2023-02-01 PROCEDURE — 3008F BODY MASS INDEX DOCD: CPT | Mod: ,,, | Performed by: NURSE PRACTITIONER

## 2023-02-01 PROCEDURE — 3079F DIAST BP 80-89 MM HG: CPT | Mod: ,,, | Performed by: NURSE PRACTITIONER

## 2023-02-01 PROCEDURE — 3075F SYST BP GE 130 - 139MM HG: CPT | Mod: ,,, | Performed by: NURSE PRACTITIONER

## 2023-02-01 NOTE — PROGRESS NOTES
Grandchild positive home Covid test and pt was around child-pt is dialysis pt 3 x wk ([facility], Sat in  [place])she is concerned due to recent hospitalization and she states she has to be tested so she can go to dialysis

## 2023-02-01 NOTE — PROGRESS NOTES
GLENIS Sanchez   [facility] [address] [place] [address]  [phone]      PATIENT NAME: Yaa Frnak  : 1970  DATE: 23  MRN: [id number]      Billing Provider: GLENIS Sanchez  Level of Service:   Patient PCP Information       Provider PCP Type    Lluvia Stafford NP General            Reason for Visit / Chief Complaint: Covid test (Grandchild positive home Covid test and pt was around child-pt is dialysis pt 3 x wk (Tues, Thurs, Sat in  [place])she is concerned due to recent hospitalization and she states she has to be tested so she can go to dialysis)       Update PCP  Update Chief Complaint         History of Present Illness / Problem Focused Workflow     Yaa Frank presents to the clinic with Covid test (Grandchild positive home Covid test and pt was around child-pt is dialysis pt 3 x wk (Tues, Thurs, Sat in  [place])she is concerned due to recent hospitalization and she states she has to be tested so she can go to dialysis)     Pt reports exposure to covid. She is concerned about risk considering her hx of ESRD, dialysis, htn and dm.     She reports no s/s currently.   Covid negative.       Review of Systems     Review of Systems   Constitutional:  Negative for fatigue and fever.   HENT:  Negative for nasal congestion and sore throat.    Eyes:  Negative for visual disturbance.   Respiratory:  Positive for shortness of breath. Negative for chest tightness.    Cardiovascular:  Negative for chest pain and leg swelling.   Gastrointestinal:  Negative for abdominal pain, change in bowel habit and change in bowel habit.   Endocrine: Negative for polydipsia, polyphagia and polyuria.   Genitourinary:  Negative for dysuria and hematuria.   Musculoskeletal:  Negative for back pain and leg pain.   Integumentary:  Negative for rash.   Neurological:  Negative for dizziness, syncope,  weakness and light-headedness.      Medical / Social / Family History     Past Medical History:   Diagnosis Date    Asthma     COVID-19     Disorder of kidney and ureter        Past Surgical History:   Procedure Laterality Date    APPENDECTOMY       SECTION      CHOLECYSTECTOMY      LAPAROSCOPIC GASTRIC BANDING      PLACEMENT OF DIALYSIS ACCESS Left        Social History  Ms. Frank  reports that she has never smoked. She has never used smokeless tobacco. She reports that she does not currently use alcohol. She reports that she does not use drugs.    Family History  Ms. Frank's family history includes Breast cancer in her paternal aunt.    Medications and Allergies     Medications  Outpatient Medications Marked as Taking for the 23 encounter (Office Visit) with GLENIS Sacnhez   Medication Sig Dispense Refill    albuterol (PROVENTIL/VENTOLIN HFA) 90 mcg/actuation inhaler Inhale 2 puffs into the lungs every 4 to 6 hours as needed.      amoxicillin (AMOXIL) 500 MG capsule Take before dental procedure      carvediloL (COREG) 3.125 MG tablet Take 3.125 mg by mouth 2 (two) times a day.      cetirizine (ZYRTEC) 5 MG chewable tablet Take 1 tablet (5 mg total) by mouth once daily. 30 tablet 11    cinacalcet (SENSIPAR) 30 MG Tab Take 30 mg by mouth every evening.      gabapentin (NEURONTIN) 100 MG capsule Take 1 capsule (100 mg total) by mouth 2 (two) times daily. 180 capsule 1    midodrine (PROAMATINE) 5 MG Tab Take 5 mg by mouth 2 (two) times a day. Only take on dialysis days      pantoprazole (PROTONIX) 40 MG tablet Take 40 mg by mouth once daily.      sevelamer carbonate (RENVELA) 800 mg Tab Take 800 mg by mouth 4 (four) times daily with meals and nightly. 4 TABLETS WITH EACH MEAL. 2 WITH EACH SNACK.      simvastatin (ZOCOR) 20 MG tablet TAKE 1 TABLET EVERY EVENING 90 tablet 3    WIXELA INHUB 250-50 mcg/dose diskus inhaler Inhale 1 puff into the lungs 2 (two) times a day.         Allergies  Review of  patient's allergies indicates:   Allergen Reactions    Ancef [cefazolin]     Hydralazine     Hydralazine analogues     Latex, natural rubber        Physical Examination     Vitals:    02/01/23 0833   BP: 136/83   Pulse: 83   Resp: 20   Temp: 97.8 °F (36.6 °C)     Physical Exam  Eyes:      Pupils: Pupils are equal, round, and reactive to light.   Cardiovascular:      Rate and Rhythm: Normal rate and regular rhythm.      Heart sounds: Normal heart sounds. No murmur heard.  Pulmonary:      Breath sounds: Normal breath sounds. No wheezing, rhonchi or rales.   Abdominal:      General: Bowel sounds are normal.   Musculoskeletal:         General: No swelling.      Cervical back: Normal range of motion and neck supple.   Skin:     General: Skin is warm and dry.   Neurological:      Mental Status: She is alert and oriented to person, place, and time.        Assessment and Plan (including Health Maintenance)      Problem List  Smart Ewireless  Document Outside HM   :    Plan: 1. Exposure to COVID-19 virus  Comments:  covid negative   Orders:  -     SARS Coronavirus 2 Antigen, POCT           Health Maintenance Due   Topic Date Due    TETANUS VACCINE  12/27/2022    COVID-19 Vaccine (4 - Booster for Andree series) 01/24/2023    Mammogram  03/16/2023       Problem List Items Addressed This Visit    None  Visit Diagnoses       Exposure to COVID-19 virus    -  Primary    covid negative     Relevant Orders    SARS Coronavirus 2 Antigen, POCT (Completed)            Health Maintenance Topics with due status: Not Due       Topic Last Completion Date    Cervical Cancer Screening 08/09/2021    Colorectal Cancer Screening 07/20/2022    Hemoglobin A1c (Prediabetes) 12/21/2022    Lipid Panel 12/21/2022       Future Appointments   Date Time Provider Department Center   3/22/2023 11:00 AM [facility]   5/15/2023 10:00 AM [facility] NURSE, [facility] AMAYA Wharton            Signature:  Corie ROUSE  [facility] [address] [place] [address]  [phone]    Date of encounter: 2/1/23

## 2023-03-22 ENCOUNTER — HOSPITAL ENCOUNTER (OUTPATIENT)
Dept: RADIOLOGY | Facility: HOSPITAL | Age: 53
Discharge: HOME | End: 2023-03-22
Payer: MEDICARE

## 2023-03-22 DIAGNOSIS — Z12.31 BREAST CANCER SCREENING BY MAMMOGRAM: ICD-10-CM

## 2023-03-22 PROCEDURE — 77067 SCR MAMMO BI INCL CAD: CPT | Mod: TC

## 2023-06-05 ENCOUNTER — OFFICE VISIT (OUTPATIENT)
Dept: FAMILY MEDICINE | Facility: CLINIC | Age: 53
End: 2023-06-05
Payer: MEDICARE

## 2023-06-05 VITALS
HEIGHT: 62 IN | WEIGHT: 293 LBS | RESPIRATION RATE: 22 BRPM | HEART RATE: 77 BPM | SYSTOLIC BLOOD PRESSURE: 132 MMHG | BODY MASS INDEX: 53.92 KG/M2 | DIASTOLIC BLOOD PRESSURE: 87 MMHG | OXYGEN SATURATION: 94 %

## 2023-06-05 DIAGNOSIS — Z78.9 ON SUPPLEMENTAL OXYGEN BY NASAL CANNULA: ICD-10-CM

## 2023-06-05 DIAGNOSIS — E78.5 HYPERLIPIDEMIA, UNSPECIFIED HYPERLIPIDEMIA TYPE: ICD-10-CM

## 2023-06-05 DIAGNOSIS — Z00.00 ENCOUNTER FOR SUBSEQUENT ANNUAL WELLNESS VISIT (AWV) IN MEDICARE PATIENT: Primary | ICD-10-CM

## 2023-06-05 DIAGNOSIS — E66.3 OVERWEIGHT: ICD-10-CM

## 2023-06-05 DIAGNOSIS — I50.9 CHRONIC HEART FAILURE, UNSPECIFIED HEART FAILURE TYPE: ICD-10-CM

## 2023-06-05 DIAGNOSIS — J45.50 SEVERE PERSISTENT ASTHMA WITHOUT COMPLICATION: ICD-10-CM

## 2023-06-05 DIAGNOSIS — Z74.09 OTHER REDUCED MOBILITY: ICD-10-CM

## 2023-06-05 DIAGNOSIS — N18.6 ESRD (END STAGE RENAL DISEASE) ON DIALYSIS: ICD-10-CM

## 2023-06-05 DIAGNOSIS — Z99.89 CPAP (CONTINUOUS POSITIVE AIRWAY PRESSURE) DEPENDENCE: ICD-10-CM

## 2023-06-05 DIAGNOSIS — Z99.2 ESRD (END STAGE RENAL DISEASE) ON DIALYSIS: ICD-10-CM

## 2023-06-05 DIAGNOSIS — E66.01 MORBID OBESITY: ICD-10-CM

## 2023-06-05 DIAGNOSIS — I10 ESSENTIAL (PRIMARY) HYPERTENSION: ICD-10-CM

## 2023-06-05 PROCEDURE — 1159F PR MEDICATION LIST DOCUMENTED IN MEDICAL RECORD: ICD-10-PCS | Mod: ,,, | Performed by: NURSE PRACTITIONER

## 2023-06-05 PROCEDURE — 1159F MED LIST DOCD IN RCRD: CPT | Mod: ,,, | Performed by: NURSE PRACTITIONER

## 2023-06-05 PROCEDURE — 3008F BODY MASS INDEX DOCD: CPT | Mod: ,,, | Performed by: NURSE PRACTITIONER

## 2023-06-05 PROCEDURE — 3008F PR BODY MASS INDEX (BMI) DOCUMENTED: ICD-10-PCS | Mod: ,,, | Performed by: NURSE PRACTITIONER

## 2023-06-05 PROCEDURE — G0009 ADMIN PNEUMOCOCCAL VACCINE: HCPCS | Mod: ,,, | Performed by: NURSE PRACTITIONER

## 2023-06-05 PROCEDURE — G0009 PNEUMOCOCCAL CONJUGATE VACCINE 20-VALENT: ICD-10-PCS | Mod: ,,, | Performed by: NURSE PRACTITIONER

## 2023-06-05 PROCEDURE — 3079F PR MOST RECENT DIASTOLIC BLOOD PRESSURE 80-89 MM HG: ICD-10-PCS | Mod: ,,, | Performed by: NURSE PRACTITIONER

## 2023-06-05 PROCEDURE — G0439 PPPS, SUBSEQ VISIT: HCPCS | Mod: ,,, | Performed by: NURSE PRACTITIONER

## 2023-06-05 PROCEDURE — 3079F DIAST BP 80-89 MM HG: CPT | Mod: ,,, | Performed by: NURSE PRACTITIONER

## 2023-06-05 PROCEDURE — 90677 PCV20 VACCINE IM: CPT | Mod: ,,, | Performed by: NURSE PRACTITIONER

## 2023-06-05 PROCEDURE — G0439 PR MEDICARE ANNUAL WELLNESS SUBSEQUENT VISIT: ICD-10-PCS | Mod: ,,, | Performed by: NURSE PRACTITIONER

## 2023-06-05 PROCEDURE — 3075F SYST BP GE 130 - 139MM HG: CPT | Mod: ,,, | Performed by: NURSE PRACTITIONER

## 2023-06-05 PROCEDURE — 90677 PNEUMOCOCCAL CONJUGATE VACCINE 20-VALENT: ICD-10-PCS | Mod: ,,, | Performed by: NURSE PRACTITIONER

## 2023-06-05 PROCEDURE — 3075F PR MOST RECENT SYSTOLIC BLOOD PRESS GE 130-139MM HG: ICD-10-PCS | Mod: ,,, | Performed by: NURSE PRACTITIONER

## 2023-06-05 RX ORDER — CARVEDILOL 6.25 MG/1
6.25 TABLET ORAL 2 TIMES DAILY WITH MEALS
COMMUNITY

## 2023-06-05 NOTE — PATIENT INSTRUCTIONS
Counseling and Referral of Other Preventative  (Italic type indicates deductible and co-insurance are waived)    Patient Name: Yaa Frank  Today's Date: 6/5/2023    Health Maintenance       Date Due Completion Date    Pneumococcal Vaccines (Age 0-64) (1 - PCV) Never done ---    TETANUS VACCINE 12/27/2022 12/27/2012    COVID-19 Vaccine (4 - Booster for Andree series) 01/24/2023 11/29/2022    Shingles Vaccine (1 of 2) 12/21/2023 (Originally 2/28/1991) ---    Hemoglobin A1c (Prediabetes) 12/21/2023 12/21/2022    Mammogram 03/22/2024 3/22/2023    Cervical Cancer Screening 08/09/2026 8/9/2021    Lipid Panel 12/21/2027 12/21/2022    Colorectal Cancer Screening 07/20/2032 7/20/2022        No orders of the defined types were placed in this encounter.    The following information is provided to all patients.  This information is to help you find resources for any of the problems found today that may be affecting your health:                Living healthy guide: www.[facility].[place].gov      Understanding Diabetes: www.diabetes.org      Eating healthy: www.cdc.gov/healthyweight      CDC home safety checklist: www.cdc.gov/steadi/patient.html      Agency on Aging: www.goea.[place].[facility]      Alcoholics anonymous (AA): www.aa.org      Physical Activity: www.breana.nih.gov/ag3prrw      Tobacco use: www.quitwithusla.org

## 2023-06-05 NOTE — PROGRESS NOTES
[place] [facility]       PATIENT NAME: Yaa Frank   : 1970    AGE: 53 y.o. DATE: 2023   MRN: [id number]        Reason for Visit / Chief Complaint: Medicare AWV Follow Up ([employer] WELLNESS SUBSEQUENT VISIT)        Yaa Frank presents for an Subsequent Medicare AWV today.     The following components were reviewed and updated:    Medical/Social/Family History:  Past Medical History:   Diagnosis Date    Asthma     CHF (congestive heart failure)     COVID-19     Disorder of kidney and ureter         Family History   Problem Relation Age of Onset    Breast cancer Paternal Aunt         Social History     Tobacco Use   Smoking Status Never   Smokeless Tobacco Never      Social History     Substance and Sexual Activity   Alcohol Use Not Currently       Family History   Problem Relation Age of Onset    Breast cancer Paternal Aunt        Past Surgical History:   Procedure Laterality Date    APPENDECTOMY       SECTION      CHOLECYSTECTOMY      LAPAROSCOPIC GASTRIC BANDING      PLACEMENT OF DIALYSIS ACCESS Left          Allergies and Current Medications     Review of patient's allergies indicates:   Allergen Reactions    Ancef [cefazolin]     Hydralazine     Hydralazine analogues     Latex, natural rubber        Current Outpatient Medications:     carvediloL (COREG) 6.25 MG tablet, Take 6.25 mg by mouth 2 (two) times daily with meals., Disp: , Rfl:     albuterol (PROVENTIL/VENTOLIN HFA) 90 mcg/actuation inhaler, Inhale 2 puffs into the lungs every 4 to 6 hours as needed., Disp: , Rfl:     amoxicillin (AMOXIL) 500 MG capsule, Take before dental procedure, Disp: , Rfl:     carvediloL (COREG) 3.125 MG tablet, Take 3.125 mg by mouth 2 (two) times a day., Disp: , Rfl:     cetirizine (ZYRTEC) 5 MG chewable tablet, Take 1 tablet (5 mg total) by mouth once daily., Disp: 30 tablet, Rfl: 11    cinacalcet (SENSIPAR) 30 MG Tab, Take 30  mg by mouth every evening., Disp: , Rfl:     ciprofloxacin-dexAMETHasone 0.3-0.1% (CIPRODEX) 0.3-0.1 % DrpS, Place 4 drops into both ears 2 (two) times daily., Disp: 7.5 mL, Rfl: 0    diphth,pertus,acell,,tetanus (BOOSTRIX) 2.5-8-5 Lf-mcg-Lf/0.5mL Susp, Inject 0.5 mLs into the muscle once. for 1 dose, Disp: 0.5 mL, Rfl: 0    gabapentin (NEURONTIN) 100 MG capsule, Take 1 capsule (100 mg total) by mouth 2 (two) times daily., Disp: 180 capsule, Rfl: 1    midodrine (PROAMATINE) 5 MG Tab, Take 5 mg by mouth 2 (two) times a day. Only take on dialysis days, Disp: , Rfl:     pantoprazole (PROTONIX) 40 MG tablet, Take 40 mg by mouth once daily., Disp: , Rfl:     sevelamer carbonate (RENVELA) 800 mg Tab, Take 800 mg by mouth 4 (four) times daily with meals and nightly. 4 TABLETS WITH EACH MEAL. 2 WITH EACH SNACK., Disp: , Rfl:     simvastatin (ZOCOR) 20 MG tablet, TAKE 1 TABLET EVERY EVENING, Disp: 90 tablet, Rfl: 3    WIXELA INHUB 250-50 mcg/dose diskus inhaler, Inhale 1 puff into the lungs 2 (two) times a day., Disp: , Rfl:     Health Risk Assessment   Fall Risk: No   Obesity: BMI 56.33     Advance Directive:  Does not have an advanced directive. Verbal education and written education included in today's AVS.    X Patient is interested in learning more about how to make advanced directives.  I provided them paperwork and offered to discuss this with them.   Depression: PHQ9 -3    HTN: 132/87   T2DM: A1C- 5.3    Tobacco use: Denies tobacco use  STI: Not at risk    Alcohol misuse: Denies alcohol use Cage Score: N/A   Statin Use: Continue statin use. Encouraged heart healthy diet & exercise   Mini Co/      Health Risk Assessment  What is your age?:  (50-60)  Are you male or female?: Female  During the past four weeks, how much have you been bothered by emotional problems such as feeling anxious, depressed, irritable, sad, or downhearted and blue?: Not at all  During the past five weeks, has your physical and/or  emotional health limited your social activities with family, friends, neighbors, or groups?: Not at all  During the past four weeks, how much bodily pain have you generally had?: No pain  During the past four weeks, was someone available to help if you needed and wanted help?: Yes, as much as I wanted  During the past four weeks, what was the hardest physical activity you could do for at least two minutes?: Moderate  Can you get to places out of walking distance without help?  (For example, can you travel alone on buses or taxis, or drive your own car?): Yes  Can you go shopping for groceries or clothes without someone's help?: Yes  Can you prepare your own meals?: Yes  Can you do your own housework without help?: Yes  Because of any health problems, do you need the help of another person with your personal care needs such as eating, bathing, dressing, or getting around the house?: No  Can you handle your own money without help?: Yes  During the past four weeks, how would you rate your health in general?: Fair  How have things been going for you during the past four weeks?: Good and bad parts about equal  Are you having difficulties driving your car?: No  Do you always fasten your seat belt when you are in a car?: Yes, usually  How often in the past four weeks have you been bothered by falling or dizzy when standing up?: Never  How often in the past four weeks have you been bothered by sexual problems?: Never  How often in the past four weeks have you been bothered by trouble eating well?: Never  How often in the past four weeks have you been bothered by teeth or denture problems?: Sometimes  How often in the past four weeks have you been bothered with problems using the telephone?: Never  How often in the past four weeks have you been bothered by tiredness or fatigue?: Never  Have you fallen two or more times in the past year?: No  Are you afraid of falling?: No  Are you a smoker?: No  During the past four weeks,  how many drinks of wine, beer, or other alcoholic beverages did you have?: No alcohol at all  Do you exercise for about 20 minutes three or more days a week?: No, I usually do not exercise this much  Have you been given any information to help you with hazards in your house that might hurt you?: Yes  Have you been given any information to help you with keeping track of your medications?: Yes  How often do you have trouble taking medicines the way you've been told to take them?: I always take them as prescribed  How confident are you that you can control and manage most of your health problems?: Very confident  What is your race? (Check all that apply.):     Opioid Risk Assessment:  Opioid risk assessment performed today. Patient is LOW risk for opoid abuse.     Opioid Risk Score         Value Time User    Opioid Risk Score  0 2023  8:35 AM Carrie Estrada RN                 Health Maintenance   Last eye exam:  with Dr. Hidalgo   Last CV screen with lipids: 2022   Diabetes screening with fasting glucose or A1c: 2022   Colonoscopy: 2022- Repeat in 10 years   Flu Vaccine: 10/10/2022   Pneumonia vaccines: 2023   COVID vaccine: 2021; 02/15/2022; 2022   Hep B vaccine: Not at risk   DEXA: N/A   Last pap/pelvic: 2021- Negative w/ -- HPV   Last Mammogram: 2023- Benign   Last PSA screen: N/A   AAA screening: N/A (once in lifetime for males 65-75 who have smoked > 100 cigarettes in lifetime)  HIV Screein2022- Nonreactive  Hepatitis C Screen: 2022- Nonreactive  Low Dose CT Scan: N/A    Health Maintenance Topics with due status: Not Due       Topic Last Completion Date    Cervical Cancer Screening 2021    Colorectal Cancer Screening 2022    Hemoglobin A1c (Prediabetes) 2022    Lipid Panel 2022    Mammogram 2023     Health Maintenance Due   Topic Date Due    TETANUS VACCINE  2022    COVID-19 Vaccine (4 -  Booster for Andree series) 01/24/2023       Incontinence  Bowel: No  Bladder: No    Lab results available in Epic or see dates from [facility] above:   Lab Results   Component Value Date    CHOL 135 12/21/2022    CHOL 216 (H) 06/23/2021     Lab Results   Component Value Date    HDL 49 12/21/2022    HDL 54 06/23/2021     Lab Results   Component Value Date    LDLCALC 66 12/21/2022    LDLCALC 127 06/23/2021     Lab Results   Component Value Date    TRIG 101 12/21/2022    TRIG 177 (H) 06/23/2021     Lab Results   Component Value Date    CHOLHDL 2.8 12/21/2022    CHOLHDL 4.0 06/23/2021       Lab Results   Component Value Date    HGBA1C 5.3 12/21/2022       Sodium   Date Value Ref Range Status   12/21/2022 138 136 - 145 mmol/L Final     Potassium   Date Value Ref Range Status   12/21/2022 4.6 3.5 - 5.1 mmol/L Final     Chloride   Date Value Ref Range Status   12/21/2022 96 (L) 98 - 107 mmol/L Final     CO2   Date Value Ref Range Status   12/21/2022 37 (H) 21 - 32 mmol/L Final     Glucose   Date Value Ref Range Status   12/21/2022 88 74 - 106 mg/dL Final     BUN   Date Value Ref Range Status   12/21/2022 38 (H) 7 - 18 mg/dL Final     Creatinine   Date Value Ref Range Status   12/21/2022 11.00 (H) 0.55 - 1.02 mg/dL Final     Calcium   Date Value Ref Range Status   12/21/2022 9.1 8.5 - 10.1 mg/dL Final     Total Protein   Date Value Ref Range Status   12/21/2022 7.7 6.4 - 8.2 g/dL Final     Albumin   Date Value Ref Range Status   12/21/2022 3.7 3.5 - 5.0 g/dL Final     Bilirubin, Total   Date Value Ref Range Status   12/21/2022 0.5 >0.0 - 1.2 mg/dL Final     Alk Phos   Date Value Ref Range Status   12/21/2022 92 41 - 108 U/L Final     AST   Date Value Ref Range Status   12/21/2022 12 (L) 15 - 37 U/L Final     ALT   Date Value Ref Range Status   12/21/2022 14 13 - 56 U/L Final     Anion Gap   Date Value Ref Range Status   12/21/2022 10 7 - 16 mmol/L Final     eGFR    Date Value Ref Range Status   06/23/2021 5 (L)  ">=60 mL/min/1.73m² Final           Care Team   PCP: GLENIS Uriarte    Eye specialist: Rocky   Cardiologist: Sarahi Iraheta   Pulmonologist: Ion          **See Completed Assessments for Annual Wellness visit within the encounter summary    The following assessments were completed & reviewed:  Depression Screening  Cognitive function Screening  Timed Get Up Test  Whisper Test  Vision Screen  Health Risk Assessment  Checklist of ADLs and IADLs      Objective  Vitals:    06/05/23 0825   BP: 132/87   Pulse: 77   Resp: (!) 22   SpO2: (!) 94%   Weight: (!) 139.7 kg (308 lb)   Height: 5' 2" (1.575 m)   PF: (!) 2 L/min   PainSc: 0-No pain      Body mass index is 56.33 kg/m².  Ideal body weight: 50.1 kg (110 lb 7.2 oz)       Physical Exam  Vitals and nursing note reviewed.   Constitutional:       Appearance: Normal appearance. She is obese.   HENT:      Head: Normocephalic.      Right Ear: Tympanic membrane normal.      Left Ear: Tympanic membrane normal.      Nose: Nose normal.      Mouth/Throat:      Mouth: Mucous membranes are moist.      Pharynx: Oropharynx is clear.   Eyes:      Conjunctiva/sclera: Conjunctivae normal.      Pupils: Pupils are equal, round, and reactive to light.   Cardiovascular:      Rate and Rhythm: Normal rate and regular rhythm.      Pulses: Normal pulses.      Heart sounds: Normal heart sounds.   Pulmonary:      Effort: Pulmonary effort is normal.      Breath sounds: Normal breath sounds.      Comments: On home portable 02 @2l NC  Abdominal:      General: Bowel sounds are normal.      Palpations: Abdomen is soft.   Musculoskeletal:      Cervical back: Normal range of motion and neck supple.      Comments: Slow to move.   Skin:     General: Skin is warm.      Capillary Refill: Capillary refill takes less than 2 seconds.   Neurological:      General: No focal deficit present.      Mental Status: She is alert and oriented to person, place, and time.   Psychiatric:         Mood and Affect: " Mood normal.         Thought Content: Thought content normal.       Assessment:     1. Encounter for subsequent annual wellness visit (AWV) in Medicare patient    2. Chronic heart failure, unspecified heart failure type    3. Morbid obesity    4. Essential (primary) hypertension    5. Hyperlipidemia, unspecified hyperlipidemia type    6. Severe persistent asthma without complication    7. ESRD (end stage renal disease) on dialysis    8. BMI 50.0-59.9, adult    9. Other reduced mobility    10. Overweight    11. On supplemental oxygen by nasal cannula    12. CPAP (continuous positive airway pressure) dependence         Plan:    Referrals/Orders:  Prevnar 20     Advised to call office if does not hear from anyone with referral appt within 2-3 weeks to check on status of referral. Voiced understanding.    Keep current on appts & continue medications as ordered. Prevent falls by wearing good nonskid shoes.      Does dialysis 3 times a week in [place]. Currently on Oxygen at 2L since Jan 2023 due to asthma vs. Possible COPD.  Followed by pulmonologist & has follow-up appt on 06/14/2023 to make a decision on dx/treatment.  Followed by Sarahi Iraheta with cardiology for CHF. Last report good & not felt to be contributing factor to her difficulty breathing.      Discussed and provided with a screening schedule and personal prevention plan in accordance with USPSTF age appropriate recommendations and Medicare screening guidelines.   Education, counseling, and referrals were provided as needed.  After Visit Summary printed and given to patient which includes written education and a list of any referrals if indicated. All education discussed with patient & handouts given to patient.      F/u plan for yearly AWV.

## 2023-06-27 ENCOUNTER — OFFICE VISIT (OUTPATIENT)
Dept: FAMILY MEDICINE | Facility: CLINIC | Age: 53
End: 2023-06-27
Payer: MEDICARE

## 2023-06-27 VITALS
BODY MASS INDEX: 53.92 KG/M2 | OXYGEN SATURATION: 94 % | RESPIRATION RATE: 20 BRPM | DIASTOLIC BLOOD PRESSURE: 70 MMHG | HEART RATE: 80 BPM | SYSTOLIC BLOOD PRESSURE: 111 MMHG | TEMPERATURE: 98 F | WEIGHT: 293 LBS | HEIGHT: 62 IN

## 2023-06-27 DIAGNOSIS — E66.01 MORBID OBESITY: ICD-10-CM

## 2023-06-27 DIAGNOSIS — Z99.2 ESRD (END STAGE RENAL DISEASE) ON DIALYSIS: ICD-10-CM

## 2023-06-27 DIAGNOSIS — E78.5 HYPERLIPIDEMIA, UNSPECIFIED HYPERLIPIDEMIA TYPE: ICD-10-CM

## 2023-06-27 DIAGNOSIS — I10 ESSENTIAL (PRIMARY) HYPERTENSION: Primary | ICD-10-CM

## 2023-06-27 DIAGNOSIS — N18.6 ESRD (END STAGE RENAL DISEASE) ON DIALYSIS: ICD-10-CM

## 2023-06-27 DIAGNOSIS — I50.9 CHRONIC HEART FAILURE, UNSPECIFIED HEART FAILURE TYPE: ICD-10-CM

## 2023-06-27 PROBLEM — Z20.822 CONTACT WITH AND (SUSPECTED) EXPOSURE TO COVID-19: Status: RESOLVED | Noted: 2021-12-14 | Resolved: 2023-06-27

## 2023-06-27 PROBLEM — J06.9 UPPER RESPIRATORY TRACT INFECTION: Status: RESOLVED | Noted: 2021-12-14 | Resolved: 2023-06-27

## 2023-06-27 PROBLEM — J32.9 SINUSITIS: Status: RESOLVED | Noted: 2022-03-31 | Resolved: 2023-06-27

## 2023-06-27 PROBLEM — E66.3 OVERWEIGHT: Status: RESOLVED | Noted: 2022-05-13 | Resolved: 2023-06-27

## 2023-06-27 PROBLEM — R21 RASH AND NONSPECIFIC SKIN ERUPTION: Status: RESOLVED | Noted: 2021-06-25 | Resolved: 2023-06-27

## 2023-06-27 PROBLEM — R05.9 COUGH: Status: RESOLVED | Noted: 2021-12-14 | Resolved: 2023-06-27

## 2023-06-27 LAB
ALBUMIN SERPL BCP-MCNC: 3.3 G/DL (ref 3.5–5)
ALBUMIN/GLOB SERPL: 0.6 {RATIO}
ALP SERPL-CCNC: 96 U/L (ref 41–108)
ALT SERPL W P-5'-P-CCNC: 14 U/L (ref 13–56)
ANION GAP SERPL CALCULATED.3IONS-SCNC: 10 MMOL/L (ref 7–16)
AST SERPL W P-5'-P-CCNC: 12 U/L (ref 15–37)
BASOPHILS # BLD AUTO: 0.05 K/UL (ref 0–0.2)
BASOPHILS NFR BLD AUTO: 0.8 % (ref 0–1)
BILIRUB SERPL-MCNC: 0.6 MG/DL (ref ?–1.2)
BUN SERPL-MCNC: 17 MG/DL (ref 7–18)
BUN/CREAT SERPL: 2 (ref 6–20)
CALCIUM SERPL-MCNC: 9.5 MG/DL (ref 8.5–10.1)
CHLORIDE SERPL-SCNC: 93 MMOL/L (ref 98–107)
CHOLEST SERPL-MCNC: 133 MG/DL (ref 0–200)
CHOLEST/HDLC SERPL: 2.9 {RATIO}
CO2 SERPL-SCNC: 36 MMOL/L (ref 21–32)
CREAT SERPL-MCNC: 7.23 MG/DL (ref 0.55–1.02)
DIFFERENTIAL METHOD BLD: ABNORMAL
EGFR (NO RACE VARIABLE) (RUSH/TITUS): 6 ML/MIN/1.73M2
EOSINOPHIL # BLD AUTO: 0.23 K/UL (ref 0–0.5)
EOSINOPHIL NFR BLD AUTO: 3.9 % (ref 1–4)
ERYTHROCYTE [DISTWIDTH] IN BLOOD BY AUTOMATED COUNT: 14.1 % (ref 11.5–14.5)
GLOBULIN SER-MCNC: 5.4 G/DL (ref 2–4)
GLUCOSE SERPL-MCNC: 93 MG/DL (ref 74–106)
HCT VFR BLD AUTO: 42.5 % (ref 38–47)
HDLC SERPL-MCNC: 46 MG/DL (ref 40–60)
HGB BLD-MCNC: 12.8 G/DL (ref 12–16)
IMM GRANULOCYTES # BLD AUTO: 0.02 K/UL (ref 0–0.04)
IMM GRANULOCYTES NFR BLD: 0.3 % (ref 0–0.4)
LDLC SERPL CALC-MCNC: 52 MG/DL
LDLC/HDLC SERPL: 1.1 {RATIO}
LYMPHOCYTES # BLD AUTO: 1.12 K/UL (ref 1–4.8)
LYMPHOCYTES NFR BLD AUTO: 19 % (ref 27–41)
MCH RBC QN AUTO: 28.4 PG (ref 27–31)
MCHC RBC AUTO-ENTMCNC: 30.1 G/DL (ref 32–36)
MCV RBC AUTO: 94.2 FL (ref 80–96)
MONOCYTES # BLD AUTO: 0.63 K/UL (ref 0–0.8)
MONOCYTES NFR BLD AUTO: 10.7 % (ref 2–6)
MPC BLD CALC-MCNC: 10 FL (ref 9.4–12.4)
NEUTROPHILS # BLD AUTO: 3.86 K/UL (ref 1.8–7.7)
NEUTROPHILS NFR BLD AUTO: 65.3 % (ref 53–65)
NONHDLC SERPL-MCNC: 87 MG/DL
NRBC # BLD AUTO: 0 X10E3/UL
NRBC, AUTO (.00): 0 %
PLATELET # BLD AUTO: 235 K/UL (ref 150–400)
POTASSIUM SERPL-SCNC: 3.8 MMOL/L (ref 3.5–5.1)
PROT SERPL-MCNC: 8.7 G/DL (ref 6.4–8.2)
RBC # BLD AUTO: 4.51 M/UL (ref 4.2–5.4)
SODIUM SERPL-SCNC: 135 MMOL/L (ref 136–145)
TRIGL SERPL-MCNC: 175 MG/DL (ref 35–150)
VLDLC SERPL-MCNC: 35 MG/DL
WBC # BLD AUTO: 5.91 K/UL (ref 4.5–11)

## 2023-06-27 PROCEDURE — 1160F RVW MEDS BY RX/DR IN RCRD: CPT | Mod: ,,, | Performed by: NURSE PRACTITIONER

## 2023-06-27 PROCEDURE — 1160F PR REVIEW ALL MEDS BY PRESCRIBER/CLIN PHARMACIST DOCUMENTED: ICD-10-PCS | Mod: ,,, | Performed by: NURSE PRACTITIONER

## 2023-06-27 PROCEDURE — 1159F PR MEDICATION LIST DOCUMENTED IN MEDICAL RECORD: ICD-10-PCS | Mod: ,,, | Performed by: NURSE PRACTITIONER

## 2023-06-27 PROCEDURE — 80061 LIPID PANEL: CPT | Mod: ,,, | Performed by: CLINICAL MEDICAL LABORATORY

## 2023-06-27 PROCEDURE — 80053 COMPREHEN METABOLIC PANEL: CPT | Mod: ,,, | Performed by: CLINICAL MEDICAL LABORATORY

## 2023-06-27 PROCEDURE — 3074F SYST BP LT 130 MM HG: CPT | Mod: ,,, | Performed by: NURSE PRACTITIONER

## 2023-06-27 PROCEDURE — 3078F DIAST BP <80 MM HG: CPT | Mod: ,,, | Performed by: NURSE PRACTITIONER

## 2023-06-27 PROCEDURE — 99213 OFFICE O/P EST LOW 20 MIN: CPT | Mod: ,,, | Performed by: NURSE PRACTITIONER

## 2023-06-27 PROCEDURE — 80061 LIPID PANEL: ICD-10-PCS | Mod: ,,, | Performed by: CLINICAL MEDICAL LABORATORY

## 2023-06-27 PROCEDURE — 3074F PR MOST RECENT SYSTOLIC BLOOD PRESSURE < 130 MM HG: ICD-10-PCS | Mod: ,,, | Performed by: NURSE PRACTITIONER

## 2023-06-27 PROCEDURE — 85025 COMPLETE CBC W/AUTO DIFF WBC: CPT | Mod: ,,, | Performed by: CLINICAL MEDICAL LABORATORY

## 2023-06-27 PROCEDURE — 85025 CBC WITH DIFFERENTIAL: ICD-10-PCS | Mod: ,,, | Performed by: CLINICAL MEDICAL LABORATORY

## 2023-06-27 PROCEDURE — 3078F PR MOST RECENT DIASTOLIC BLOOD PRESSURE < 80 MM HG: ICD-10-PCS | Mod: ,,, | Performed by: NURSE PRACTITIONER

## 2023-06-27 PROCEDURE — 80053 COMPREHENSIVE METABOLIC PANEL: ICD-10-PCS | Mod: ,,, | Performed by: CLINICAL MEDICAL LABORATORY

## 2023-06-27 PROCEDURE — 99213 PR OFFICE/OUTPT VISIT, EST, LEVL III, 20-29 MIN: ICD-10-PCS | Mod: ,,, | Performed by: NURSE PRACTITIONER

## 2023-06-27 PROCEDURE — 1159F MED LIST DOCD IN RCRD: CPT | Mod: ,,, | Performed by: NURSE PRACTITIONER

## 2023-06-27 PROCEDURE — 3008F BODY MASS INDEX DOCD: CPT | Mod: ,,, | Performed by: NURSE PRACTITIONER

## 2023-06-27 PROCEDURE — 3008F PR BODY MASS INDEX (BMI) DOCUMENTED: ICD-10-PCS | Mod: ,,, | Performed by: NURSE PRACTITIONER

## 2023-06-27 RX ORDER — IPRATROPIUM BROMIDE AND ALBUTEROL SULFATE 2.5; .5 MG/3ML; MG/3ML
SOLUTION RESPIRATORY (INHALATION)
COMMUNITY
Start: 2023-02-24

## 2023-06-27 RX ORDER — GABAPENTIN 100 MG/1
100 CAPSULE ORAL 2 TIMES DAILY
Qty: 180 CAPSULE | Refills: 1 | Status: SHIPPED | OUTPATIENT
Start: 2023-06-27 | End: 2023-12-24

## 2023-06-27 RX ORDER — CIPROFLOXACIN AND DEXAMETHASONE 3; 1 MG/ML; MG/ML
4 SUSPENSION/ DROPS AURICULAR (OTIC) 2 TIMES DAILY
Qty: 7.5 ML | Refills: 1 | Status: SHIPPED | OUTPATIENT
Start: 2023-06-27 | End: 2023-06-29

## 2023-06-27 RX ORDER — SIMVASTATIN 20 MG/1
20 TABLET, FILM COATED ORAL NIGHTLY
Qty: 90 TABLET | Refills: 1 | Status: SHIPPED | OUTPATIENT
Start: 2023-06-27 | End: 2023-11-27

## 2023-06-27 RX ORDER — PANTOPRAZOLE SODIUM 40 MG/1
40 TABLET, DELAYED RELEASE ORAL DAILY
Qty: 90 TABLET | Refills: 1 | Status: SHIPPED | OUTPATIENT
Start: 2023-06-27 | End: 2023-11-27

## 2023-06-27 NOTE — PROGRESS NOTES
GLENIS Sanchez   [place] [facility] [address] [place] [address]  [phone]      PATIENT NAME: Yaa Frank  : 1970  DATE: 23  MRN: [id number]      Billing Provider: GLENIS Sanchez  Level of Service:   Patient PCP Information       Provider PCP Type    GLENIS Sanchez General            Reason for Visit / Chief Complaint: Follow-up, Hypertension, and Hyperlipidemia       Update PCP  Update Chief Complaint         History of Present Illness / Problem Focused Workflow     Yaa Frank presents to the clinic with Follow-up, Hypertension, and Hyperlipidemia     Patient presents for routine follow-up and evaluation.  She has a history of multiple medical conditions including hypertension, end-stage renal disease on dialysis, heart failure, chronic shortness of breath, chronic anemia, and obesity.  She is followed by multiple specialists.  She is here today for routine eye examination      BP appears  controlled today. Home meds reviewed  The current medical regimen is effective;  continue present plan and medications. Recommended patient to check home readings to monitor and see me for followup as scheduled or sooner as needed.   Discussed sodium restriction, maintaining ideal body weight and regular exercise program as physiologic means to continue to achieve blood pressure control in addition to medication compliance.  Patient was educated that both decongestant and anti-inflammatory medication may raise blood pressure.    Discussed need for low fat/low cholesterol diet, regular exercise, and weight control.   Cardiovascular risk and specific lipid/LDL goals reviewed.      Review of Systems     Review of Systems   Constitutional:  Positive for fatigue. Negative for fever.   HENT:  Negative for nasal congestion and sore throat.    Eyes:  Negative for visual disturbance.   Respiratory:   Positive for shortness of breath. Negative for chest tightness.    Cardiovascular:  Negative for chest pain and leg swelling.   Gastrointestinal:  Negative for abdominal pain, change in bowel habit and change in bowel habit.   Endocrine: Negative for polydipsia, polyphagia and polyuria.   Genitourinary:  Negative for dysuria and hematuria.   Musculoskeletal:  Negative for back pain and leg pain.   Integumentary:  Negative for rash.   Neurological:  Negative for dizziness, syncope, weakness and light-headedness.      Medical / Social / Family History     Past Medical History:   Diagnosis Date    Asthma     CHF (congestive heart failure)     COVID-19     Disorder of kidney and ureter        Past Surgical History:   Procedure Laterality Date    APPENDECTOMY       SECTION      CHOLECYSTECTOMY      LAPAROSCOPIC GASTRIC BANDING      PLACEMENT OF DIALYSIS ACCESS Left        Social History  Ms. Frank  reports that she has never smoked. She has never used smokeless tobacco. She reports that she does not currently use alcohol. She reports that she does not use drugs.    Family History  Ms. Frank's family history includes Breast cancer in her paternal aunt.    Medications and Allergies     Medications  Outpatient Medications Marked as Taking for the 23 encounter (Office Visit) with GLENIS Sanchez   Medication Sig Dispense Refill    albuterol (PROVENTIL/VENTOLIN HFA) 90 mcg/actuation inhaler Inhale 2 puffs into the lungs every 4 to 6 hours as needed.      albuterol-ipratropium (DUO-NEB) 2.5 mg-0.5 mg/3 mL nebulizer solution       amoxicillin (AMOXIL) 500 MG capsule Take before dental procedure      carvediloL (COREG) 6.25 MG tablet Take 6.25 mg by mouth 2 (two) times daily with meals.      cetirizine (ZYRTEC) 5 MG chewable tablet Take 1 tablet (5 mg total) by mouth once daily. 30 tablet 11    cinacalcet (SENSIPAR) 30 MG Tab Take 30 mg by mouth every evening.      midodrine (PROAMATINE) 5 MG Tab Take 5 mg  by mouth 2 (two) times daily as needed. Only take on dialysis days      sevelamer carbonate (RENVELA) 800 mg Tab Take 800 mg by mouth 4 (four) times daily with meals and nightly. 4 TABLETS WITH EACH MEAL. 2 WITH EACH SNACK.      WIXELA INHUB 250-50 mcg/dose diskus inhaler Inhale 1 puff into the lungs 2 (two) times a day.      [DISCONTINUED] gabapentin (NEURONTIN) 100 MG capsule Take 1 capsule (100 mg total) by mouth 2 (two) times daily. 180 capsule 1    [DISCONTINUED] pantoprazole (PROTONIX) 40 MG tablet Take 40 mg by mouth once daily.      [DISCONTINUED] simvastatin (ZOCOR) 20 MG tablet TAKE 1 TABLET EVERY EVENING 90 tablet 3       Allergies  Review of patient's allergies indicates:   Allergen Reactions    Ancef [cefazolin]     Hydralazine     Hydralazine analogues     Latex, natural rubber        Physical Examination     Vitals:    06/27/23 1313   BP: 111/70   Pulse: 80   Resp: 20   Temp: 98 °F (36.7 °C)     Physical Exam  Eyes:      Pupils: Pupils are equal, round, and reactive to light.   Cardiovascular:      Rate and Rhythm: Normal rate and regular rhythm.      Heart sounds: Normal heart sounds. No murmur heard.  Pulmonary:      Breath sounds: Normal breath sounds. No wheezing, rhonchi or rales.   Abdominal:      General: Bowel sounds are normal.   Musculoskeletal:         General: No swelling.      Cervical back: Normal range of motion and neck supple.   Skin:     General: Skin is warm and dry.   Neurological:      Mental Status: She is alert and oriented to person, place, and time.        Lab Results   Component Value Date    WBC 4.79 12/21/2022    HGB 12.1 12/21/2022    HCT 40.0 12/21/2022    MCV 96.9 (H) 12/21/2022     12/21/2022        Sodium   Date Value Ref Range Status   12/21/2022 138 136 - 145 mmol/L Final     Potassium   Date Value Ref Range Status   12/21/2022 4.6 3.5 - 5.1 mmol/L Final     Chloride   Date Value Ref Range Status   12/21/2022 96 (L) 98 - 107 mmol/L Final     CO2   Date Value  Ref Range Status   12/21/2022 37 (H) 21 - 32 mmol/L Final     Glucose   Date Value Ref Range Status   12/21/2022 88 74 - 106 mg/dL Final     BUN   Date Value Ref Range Status   12/21/2022 38 (H) 7 - 18 mg/dL Final     Creatinine   Date Value Ref Range Status   12/21/2022 11.00 (H) 0.55 - 1.02 mg/dL Final     Calcium   Date Value Ref Range Status   12/21/2022 9.1 8.5 - 10.1 mg/dL Final     Total Protein   Date Value Ref Range Status   12/21/2022 7.7 6.4 - 8.2 g/dL Final     Albumin   Date Value Ref Range Status   12/21/2022 3.7 3.5 - 5.0 g/dL Final     Bilirubin, Total   Date Value Ref Range Status   12/21/2022 0.5 >0.0 - 1.2 mg/dL Final     Alk Phos   Date Value Ref Range Status   12/21/2022 92 41 - 108 U/L Final     AST   Date Value Ref Range Status   12/21/2022 12 (L) 15 - 37 U/L Final     ALT   Date Value Ref Range Status   12/21/2022 14 13 - 56 U/L Final     Anion Gap   Date Value Ref Range Status   12/21/2022 10 7 - 16 mmol/L Final     eGFR   Date Value Ref Range Status   12/21/2022 4 (L) >=60 mL/min/1.73m² Final      Lab Results   Component Value Date    HGBA1C 5.3 12/21/2022      Lab Results   Component Value Date    CHOL 135 12/21/2022    CHOL 216 (H) 06/23/2021     Lab Results   Component Value Date    HDL 49 12/21/2022    HDL 54 06/23/2021     Lab Results   Component Value Date    LDLCALC 66 12/21/2022    LDLCALC 127 06/23/2021     No results found for: DLDL  Lab Results   Component Value Date    TRIG 101 12/21/2022    TRIG 177 (H) 06/23/2021     Lab Results   Component Value Date    CHOLHDL 2.8 12/21/2022    CHOLHDL 4.0 06/23/2021      Lab Results   Component Value Date    TSH 6.820 (H) 12/21/2022    FREET4 1.05 12/21/2022        Assessment and Plan (including Health Maintenance)      Problem List  Smart Sets  Document Outside HM   :    Plan:     1. Essential (primary) hypertension  -     CBC Auto Differential; Future; Expected date: 06/27/2023  -     Comprehensive Metabolic Panel; Future; Expected date:  06/27/2023  -     Microalbumin/Creatinine Ratio, Urine; Future; Expected date: 06/27/2023    2. Hyperlipidemia, unspecified hyperlipidemia type  Comments:  cont home meds  Orders:  -     Lipid Panel; Future; Expected date: 06/27/2023    3. ESRD (end stage renal disease) on dialysis  Comments:  on dialysis TTsat  doing well     4. Morbid obesity    5. Chronic heart failure, unspecified heart failure type  Comments:  followed by cardiology   Dr Tameka Iraheta    Other orders  -     simvastatin (ZOCOR) 20 MG tablet; Take 1 tablet (20 mg total) by mouth every evening.  Dispense: 90 tablet; Refill: 1  -     gabapentin (NEURONTIN) 100 MG capsule; Take 1 capsule (100 mg total) by mouth 2 (two) times daily.  Dispense: 180 capsule; Refill: 1  -     pantoprazole (PROTONIX) 40 MG tablet; Take 1 tablet (40 mg total) by mouth once daily.  Dispense: 90 tablet; Refill: 1  -     ciprofloxacin-dexAMETHasone 0.3-0.1% (CIPRODEX) 0.3-0.1 % DrpS; Place 4 drops into both ears 2 (two) times daily.  Dispense: 7.5 mL; Refill: 1         There are no Patient Instructions on file for this visit.     Health Maintenance Due   Topic Date Due    Diabetes Urine Screening  Never done         Health Maintenance Topics with due status: Not Due       Topic Last Completion Date    Cervical Cancer Screening 08/09/2021    Colorectal Cancer Screening 07/20/2022    Lipid Panel 12/21/2022    Hemoglobin A1c 12/21/2022    Mammogram 03/22/2023    TETANUS VACCINE 06/05/2023    Shingles Vaccine 06/05/2023       Future Appointments   Date Time Provider Department Center   12/12/2023  2:00 PM GLENIS Sanchez [facility] [place]   3/27/2024 11:00 AM [facility] [id number] OB [facility] [place]   6/6/2024  8:00 AM AWV NURSE, [facility] [place]            Signature:  GLENIS Sanchez  [place] [facility] [address] [place]  [address]  [phone]    Date of encounter: 6/27/23

## 2023-06-29 RX ORDER — OFLOXACIN 3 MG/ML
5 SOLUTION AURICULAR (OTIC) DAILY
Qty: 5 ML | Refills: 0 | Status: SHIPPED | OUTPATIENT
Start: 2023-06-29 | End: 2023-07-06

## 2023-07-09 DIAGNOSIS — Z71.89 COMPLEX CARE COORDINATION: ICD-10-CM

## 2023-09-04 PROBLEM — Z00.00 ENCOUNTER FOR SUBSEQUENT ANNUAL WELLNESS VISIT (AWV) IN MEDICARE PATIENT: Status: RESOLVED | Noted: 2022-03-31 | Resolved: 2023-09-04

## 2023-11-27 RX ORDER — SIMVASTATIN 20 MG/1
20 TABLET, FILM COATED ORAL NIGHTLY
Qty: 90 TABLET | Refills: 1 | Status: SHIPPED | OUTPATIENT
Start: 2023-11-27

## 2023-11-27 RX ORDER — PANTOPRAZOLE SODIUM 40 MG/1
40 TABLET, DELAYED RELEASE ORAL
Qty: 90 TABLET | Refills: 1 | Status: SHIPPED | OUTPATIENT
Start: 2023-11-27

## 2024-02-09 DIAGNOSIS — Z71.89 COMPLEX CARE COORDINATION: ICD-10-CM

## 2024-02-13 ENCOUNTER — TELEPHONE (OUTPATIENT)
Dept: FAMILY MEDICINE | Facility: CLINIC | Age: 54
End: 2024-02-13
Payer: MEDICARE

## 2024-02-13 NOTE — TELEPHONE ENCOUNTER
Spoke with pt. Instructed to go to ER with shoulder injury. Xray is unavailable in the clinic at this time

## 2024-02-13 NOTE — TELEPHONE ENCOUNTER
----- Message from Daisy Lombardi sent at 2/13/2024  1:13 PM CST -----  Regarding: appt  Patient want to if she can see you her  tomorrow morning she fell and hurt her right shoulder. Please call her @ [phone]

## 2024-02-19 ENCOUNTER — TELEPHONE (OUTPATIENT)
Dept: FAMILY MEDICINE | Facility: CLINIC | Age: 54
End: 2024-02-19
Payer: MEDICARE

## 2024-02-19 NOTE — TELEPHONE ENCOUNTER
----- Message from Nicole Jarrell sent at 2/19/2024  1:43 PM CST -----  Pt was seen @ [facility] on 2/13 for a fall she had and landed on her rt shoulder - states she was told to f/u with her pcp to get referred out for physical therapy - next opening generated was 3/12 - wanting to see if she can be a walkin sooner - tues/ thurs anytime after 11 - mon-wed-fri - pt call back # [phone]

## 2024-02-26 ENCOUNTER — OFFICE VISIT (OUTPATIENT)
Dept: FAMILY MEDICINE | Facility: CLINIC | Age: 54
End: 2024-02-26
Payer: MEDICARE

## 2024-02-26 VITALS
BODY MASS INDEX: 53.92 KG/M2 | SYSTOLIC BLOOD PRESSURE: 138 MMHG | DIASTOLIC BLOOD PRESSURE: 91 MMHG | WEIGHT: 293 LBS | HEART RATE: 96 BPM | RESPIRATION RATE: 18 BRPM | OXYGEN SATURATION: 85 % | TEMPERATURE: 98 F | HEIGHT: 62 IN

## 2024-02-26 DIAGNOSIS — S40.011D CONTUSION OF RIGHT SHOULDER, SUBSEQUENT ENCOUNTER: ICD-10-CM

## 2024-02-26 DIAGNOSIS — M25.511 ACUTE PAIN OF RIGHT SHOULDER: Primary | ICD-10-CM

## 2024-02-26 DIAGNOSIS — N18.6 ESRD (END STAGE RENAL DISEASE) ON DIALYSIS: ICD-10-CM

## 2024-02-26 DIAGNOSIS — I10 ESSENTIAL (PRIMARY) HYPERTENSION: ICD-10-CM
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
## 2024-02-26 DIAGNOSIS — E66.01 MORBID OBESITY: ICD-10-CM

## 2024-02-26 DIAGNOSIS — Z78.9 ON SUPPLEMENTAL OXYGEN BY NASAL CANNULA: ICD-10-CM

## 2024-02-26 DIAGNOSIS — Z99.2 ESRD (END STAGE RENAL DISEASE) ON DIALYSIS: ICD-10-CM

## 2024-02-26 PROCEDURE — 3075F SYST BP GE 130 - 139MM HG: CPT | Mod: ,,,

## 2024-02-26 PROCEDURE — 3080F DIAST BP >= 90 MM HG: CPT | Mod: ,,,

## 2024-02-26 PROCEDURE — 1159F MED LIST DOCD IN RCRD: CPT | Mod: ,,,

## 2024-02-26 PROCEDURE — 99212 OFFICE O/P EST SF 10 MIN: CPT | Mod: ,,,

## 2024-02-26 PROCEDURE — 3008F BODY MASS INDEX DOCD: CPT | Mod: ,,,

## 2024-02-26 PROCEDURE — 1160F RVW MEDS BY RX/DR IN RCRD: CPT | Mod: ,,,

## 2024-02-26 NOTE — PROGRESS NOTES
Carlee Ford NP   [address] [place] [address]     PATIENT NAME: Yaa Frank  : 1970  DATE: 24  MRN: [id number]      Billing Provider: Carlee Ford NP  Level of Service:   Patient PCP Information       Provider PCP Type    GLENIS Sanchez General            Reason for Visit / Chief Complaint: Referral (She fell and hurt her arm)       Update PCP  Update Chief Complaint         History of Present Illness / Problem Focused Workflow     Yaa Frank presents to the clinic with Referral (She fell and hurt her arm)     Patient here today with complaint of fall at home on . She reports slipping on a wet floor at home, landing on right shoulder. Was seen at [facility] and xray was done. Was told there was bruising and contusion. Here today for referral for physical therapy. Was given script for Hydrocodone. Which is unable to tolerate. She is doing exercises at home and using muscle rub. ER records requested. Referral for physical therapy. Return to clinic if symptoms worsen and as needed.           Review of Systems     Review of Systems   Constitutional: Negative.    Respiratory:  Positive for shortness of breath (supplemental oxygen).    Cardiovascular: Negative.    Gastrointestinal: Negative.    Genitourinary: Negative.    Musculoskeletal:  Positive for arthralgias.   Neurological: Negative.    Hematological: Negative.    Psychiatric/Behavioral: Negative.          Medical / Social / Family History     Past Medical History:   Diagnosis Date    Asthma     CHF (congestive heart failure)     COVID-19     Disorder of kidney and ureter        Past Surgical History:   Procedure Laterality Date    APPENDECTOMY       SECTION      CHOLECYSTECTOMY      LAPAROSCOPIC GASTRIC BANDING      PLACEMENT OF DIALYSIS ACCESS Left        Social History  Ms.  reports that she has never smoked. She has never used smokeless tobacco. She reports that she does not  "currently use alcohol. She reports that she does not use drugs.    Family History  Ms.'s family history includes Breast cancer in her paternal aunt.    Medications and Allergies     Medications  No outpatient medications have been marked as taking for the 2/26/24 encounter (Office Visit) with Carlee Ford NP.       Allergies  Review of patient's allergies indicates:   Allergen Reactions    Ancef [cefazolin]     Hydralazine     Hydralazine analogues     Latex, natural rubber        Physical Examination   BP (!) 151/96 (BP Location: Left arm, Patient Position: Sitting, BP Method: Large (Automatic))   Pulse 96   Temp 98.1 °F (36.7 °C) (Oral)   Resp 18   Ht 5' 2" (1.575 m)   Wt (!) 139.3 kg (307 lb)   SpO2 (!) 85%   BMI 56.15 kg/m²    Physical Exam  Vitals and nursing note reviewed.   Constitutional:       Appearance: Normal appearance. She is obese.   HENT:      Head: Normocephalic.      Right Ear: Tympanic membrane normal.      Left Ear: Tympanic membrane normal.      Nose: Nose normal.      Mouth/Throat:      Mouth: Mucous membranes are moist.      Pharynx: Oropharynx is clear.   Eyes:      Conjunctiva/sclera: Conjunctivae normal.      Pupils: Pupils are equal, round, and reactive to light.   Cardiovascular:      Rate and Rhythm: Normal rate and regular rhythm.      Pulses: Normal pulses.      Heart sounds: Normal heart sounds.   Pulmonary:      Effort: Pulmonary effort is normal.      Breath sounds: Normal breath sounds.      Comments: Supplemental oxygen  Abdominal:      General: Bowel sounds are normal.      Palpations: Abdomen is soft.   Musculoskeletal:      Right shoulder: Tenderness present. Decreased range of motion.      Cervical back: Normal range of motion and neck supple.   Skin:     General: Skin is warm.      Capillary Refill: Capillary refill takes less than 2 seconds.   Neurological:      General: No focal deficit present.      Mental Status: She is alert and oriented to person, place, " and time.   Psychiatric:         Mood and Affect: Mood normal.         Thought Content: Thought content normal.        Assessment and Plan (including Health Maintenance)      Problem List  Smart Sets  Document Outside HM   :    Plan:   Continue medications  Referral to physical therapy  Return to clinic as needed.         Health Maintenance Due   Topic Date Due    Diabetes Urine Screening  Never done    Shingles Vaccine (2 of 2) 07/31/2023    Influenza Vaccine (1) 09/01/2023    Hemoglobin A1c  12/21/2023    Mammogram  03/22/2024       Problem List Items Addressed This Visit    None      Health Maintenance Topics with due status: Not Due       Topic Last Completion Date    Cervical Cancer Screening 08/09/2021    Colorectal Cancer Screening 07/20/2022    TETANUS VACCINE 06/05/2023    Lipid Panel 06/27/2023       Future Appointments   Date Time Provider Department Center   3/27/2024 11:00 AM [facility]   6/6/2024  8:00 AM AWV NURSE, [facility] A[facility]            Signature:  Carlee Ford NP      [address] [place] [address]    Date of encounter: 2/26/24

## 2024-02-26 NOTE — TELEPHONE ENCOUNTER
Forwarded message to ms alonzo   [email or web address]
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
## 2024-02-27 PROBLEM — M25.511 ACUTE PAIN OF RIGHT SHOULDER: Status: ACTIVE | Noted: 2024-02-27

## 2024-02-27 PROBLEM — S40.011A CONTUSION OF RIGHT SHOULDER: Status: ACTIVE | Noted: 2024-02-27

## 2024-02-27 NOTE — ASSESSMENT & PLAN NOTE
Patient here today with complaint of fall at home on feb 5th. She reports slipping on a wet floor at home, landing on right shoulder. Was seen at [facility] and xray was done. Was told there was bruising and contusion. Here today for referral for physical therapy. Was given script for Hydrocodone. Which is unable to tolerate. She is doing exercises at home and using muscle rub. ER records requested. Referral for physical therapy. Return to clinic if symptoms worsen and as needed.

## 2024-02-27 NOTE — ASSESSMENT & PLAN NOTE
Patient here today with complaint of fall at home on feb 5th. She reports slipping on a wet floor at home, landing on right shoulder. Was seen at [facility] and xray was done. Was told there was bruising and contusion. Here today for referral for physical therapy. Was given script for Hydrocodone. Which is unable to tolerate. She is doing exercises at home and using muscle rub. ER records requested. Referral for physical therapy. Return to clinic if symptoms worsen and as needed.

## 2024-02-27 NOTE — ASSESSMENT & PLAN NOTE
/91 today. She has not had Bp medications today. Advised on Dash diet. Check BP daily at home. Keep scheduled follow up with PCP.

## 2024-02-27 NOTE — ASSESSMENT & PLAN NOTE
Patient here today with complaint of fall at home on feb 5th. She reports slipping on a wet floor at home, landing on right shoulder. Was seen at [facility] and xray was done. Was told there was bruising and contusion. Here today for referral for physical therapy. Was given script for Hydrocodone. Which is unable to tolerate. She is doing exercises at home and using muscle rub. ER records requested. Referral for physical therapy. Return to clinic if symptoms worsen and as needed.

## 2024-03-22 DIAGNOSIS — Z12.31 ENCOUNTER FOR SCREENING MAMMOGRAM FOR MALIGNANT NEOPLASM OF BREAST: Primary | ICD-10-CM

## 2024-03-27 ENCOUNTER — HOSPITAL ENCOUNTER (OUTPATIENT)
Dept: RADIOLOGY | Facility: HOSPITAL | Age: 54
Discharge: HOME | End: 2024-03-27
Attending: NURSE PRACTITIONER
Payer: MEDICARE

## 2024-03-27 VITALS — HEIGHT: 62 IN | WEIGHT: 293 LBS | BODY MASS INDEX: 53.92 KG/M2

## 2024-03-27 DIAGNOSIS — Z12.31 ENCOUNTER FOR SCREENING MAMMOGRAM FOR MALIGNANT NEOPLASM OF BREAST: ICD-10-CM

## 2024-03-27 PROCEDURE — 77067 SCR MAMMO BI INCL CAD: CPT | Mod: TC

## 2024-04-15 ENCOUNTER — TELEPHONE (OUTPATIENT)
Dept: FAMILY MEDICINE | Facility: CLINIC | Age: 54
End: 2024-04-15
Payer: MEDICARE

## 2024-04-15 NOTE — TELEPHONE ENCOUNTER
----- Message from Anne Lopez sent at 3/4/2024  1:09 PM CST -----  Pt was seen last Mon and is still waiting of RX to be sent in. She states this is her 3rd call about this.   Pt is supposed to start phys therapy tomorrow but has not been approved yet. She said its prob no need cause she's waited this long and she's been doing some therapy at home. Call [phone].

## 2024-04-15 NOTE — TELEPHONE ENCOUNTER
----- Message from Adelaide Gutierrez sent at 2/27/2024  9:57 AM CST -----  Please check on her muscle relaxer to [facility] in [place] from yesterday's visit. She is also asking about her A1C and when she needs to have it done. Call back # [phone]

## 2024-05-13 ENCOUNTER — OFFICE VISIT (OUTPATIENT)
Dept: FAMILY MEDICINE | Facility: CLINIC | Age: 54
End: 2024-05-13
Payer: MEDICARE

## 2024-05-13 VITALS
OXYGEN SATURATION: 92 % | WEIGHT: 293 LBS | HEART RATE: 84 BPM | SYSTOLIC BLOOD PRESSURE: 155 MMHG | DIASTOLIC BLOOD PRESSURE: 87 MMHG | TEMPERATURE: 98 F | BODY MASS INDEX: 53.92 KG/M2 | HEIGHT: 62 IN | RESPIRATION RATE: 20 BRPM

## 2024-05-13 DIAGNOSIS — E78.5 HYPERLIPIDEMIA, UNSPECIFIED HYPERLIPIDEMIA TYPE: ICD-10-CM

## 2024-05-13 DIAGNOSIS — R73.01 IFG (IMPAIRED FASTING GLUCOSE): ICD-10-CM

## 2024-05-13 DIAGNOSIS — I10 ESSENTIAL (PRIMARY) HYPERTENSION: Primary | ICD-10-CM

## 2024-05-13 DIAGNOSIS — D64.9 ANEMIA, UNSPECIFIED TYPE: ICD-10-CM

## 2024-05-13 DIAGNOSIS — I73.9 PERIPHERAL VASCULAR DISEASE, UNSPECIFIED: ICD-10-CM

## 2024-05-13 DIAGNOSIS — I50.9 CHRONIC HEART FAILURE, UNSPECIFIED HEART FAILURE TYPE: ICD-10-CM

## 2024-05-13 DIAGNOSIS — Z99.2 ESRD (END STAGE RENAL DISEASE) ON DIALYSIS: ICD-10-CM

## 2024-05-13 DIAGNOSIS — N18.6 ESRD (END STAGE RENAL DISEASE) ON DIALYSIS: ICD-10-CM

## 2024-05-13 LAB
ALBUMIN SERPL BCP-MCNC: 3.5 G/DL (ref 3.5–5)
ALBUMIN/GLOB SERPL: 0.8 {RATIO}
ALP SERPL-CCNC: 84 U/L (ref 41–108)
ALT SERPL W P-5'-P-CCNC: 12 U/L (ref 13–56)
ANION GAP SERPL CALCULATED.3IONS-SCNC: 9 MMOL/L (ref 7–16)
AST SERPL W P-5'-P-CCNC: 12 U/L (ref 15–37)
BASOPHILS # BLD AUTO: 0.04 K/UL (ref 0–0.2)
BASOPHILS NFR BLD AUTO: 0.8 % (ref 0–1)
BILIRUB SERPL-MCNC: 0.5 MG/DL (ref ?–1.2)
BUN SERPL-MCNC: 26 MG/DL (ref 7–18)
BUN/CREAT SERPL: 3 (ref 6–20)
CALCIUM SERPL-MCNC: 9.1 MG/DL (ref 8.5–10.1)
CHLORIDE SERPL-SCNC: 97 MMOL/L (ref 98–107)
CHOLEST SERPL-MCNC: 119 MG/DL (ref 0–200)
CHOLEST/HDLC SERPL: 2.7 {RATIO}
CO2 SERPL-SCNC: 38 MMOL/L (ref 21–32)
CREAT SERPL-MCNC: 9.3 MG/DL (ref 0.55–1.02)
DIFFERENTIAL METHOD BLD: ABNORMAL
EGFR (NO RACE VARIABLE) (RUSH/TITUS): 5 ML/MIN/1.73M2
EOSINOPHIL # BLD AUTO: 0.15 K/UL (ref 0–0.5)
EOSINOPHIL NFR BLD AUTO: 3.1 % (ref 1–4)
ERYTHROCYTE [DISTWIDTH] IN BLOOD BY AUTOMATED COUNT: 12.6 % (ref 11.5–14.5)
EST. AVERAGE GLUCOSE BLD GHB EST-MCNC: 88 MG/DL
FERRITIN SERPL-MCNC: 1006 NG/ML (ref 8–252)
GLOBULIN SER-MCNC: 4.4 G/DL (ref 2–4)
GLUCOSE SERPL-MCNC: 96 MG/DL (ref 74–106)
HBA1C MFR BLD HPLC: 4.7 % (ref 4.5–6.6)
HCT VFR BLD AUTO: 31 % (ref 38–47)
HDLC SERPL-MCNC: 44 MG/DL (ref 40–60)
HGB BLD-MCNC: 9.4 G/DL (ref 12–16)
IMM GRANULOCYTES # BLD AUTO: 0.02 K/UL (ref 0–0.04)
IMM GRANULOCYTES NFR BLD: 0.4 % (ref 0–0.4)
IRON SATN MFR SERPL: 25 % (ref 14–50)
IRON SERPL-MCNC: 62 ΜG/DL (ref 50–170)
LDLC SERPL CALC-MCNC: 56 MG/DL
LDLC/HDLC SERPL: 1.3 {RATIO}
LYMPHOCYTES # BLD AUTO: 0.95 K/UL (ref 1–4.8)
LYMPHOCYTES NFR BLD AUTO: 19.3 % (ref 27–41)
MCH RBC QN AUTO: 28.4 PG (ref 27–31)
MCHC RBC AUTO-ENTMCNC: 30.3 G/DL (ref 32–36)
MCV RBC AUTO: 93.7 FL (ref 80–96)
MONOCYTES # BLD AUTO: 0.46 K/UL (ref 0–0.8)
MONOCYTES NFR BLD AUTO: 9.4 % (ref 2–6)
MPC BLD CALC-MCNC: 10.8 FL (ref 9.4–12.4)
NEUTROPHILS # BLD AUTO: 3.29 K/UL (ref 1.8–7.7)
NEUTROPHILS NFR BLD AUTO: 67 % (ref 53–65)
NONHDLC SERPL-MCNC: 75 MG/DL
NRBC # BLD AUTO: 0 X10E3/UL
NRBC, AUTO (.00): 0 %
PLATELET # BLD AUTO: 222 K/UL (ref 150–400)
POTASSIUM SERPL-SCNC: 3.3 MMOL/L (ref 3.5–5.1)
PROT SERPL-MCNC: 7.9 G/DL (ref 6.4–8.2)
RBC # BLD AUTO: 3.31 M/UL (ref 4.2–5.4)
SODIUM SERPL-SCNC: 141 MMOL/L (ref 136–145)
TIBC SERPL-MCNC: 244 ΜG/DL (ref 250–450)
TRIGL SERPL-MCNC: 97 MG/DL (ref 35–150)
VLDLC SERPL-MCNC: 19 MG/DL
WBC # BLD AUTO: 4.91 K/UL (ref 4.5–11)

## 2024-05-13 PROCEDURE — 85025 COMPLETE CBC W/AUTO DIFF WBC: CPT | Mod: ,,, | Performed by: CLINICAL MEDICAL LABORATORY

## 2024-05-13 PROCEDURE — 3008F BODY MASS INDEX DOCD: CPT | Mod: ,,, | Performed by: NURSE PRACTITIONER

## 2024-05-13 PROCEDURE — 83550 IRON BINDING TEST: CPT | Mod: ,,, | Performed by: CLINICAL MEDICAL LABORATORY

## 2024-05-13 PROCEDURE — 3079F DIAST BP 80-89 MM HG: CPT | Mod: ,,, | Performed by: NURSE PRACTITIONER

## 2024-05-13 PROCEDURE — 3044F HG A1C LEVEL LT 7.0%: CPT | Mod: ,,, | Performed by: NURSE PRACTITIONER

## 2024-05-13 PROCEDURE — 80061 LIPID PANEL: CPT | Mod: ,,, | Performed by: CLINICAL MEDICAL LABORATORY

## 2024-05-13 PROCEDURE — 1159F MED LIST DOCD IN RCRD: CPT | Mod: ,,, | Performed by: NURSE PRACTITIONER

## 2024-05-13 PROCEDURE — 1160F RVW MEDS BY RX/DR IN RCRD: CPT | Mod: ,,, | Performed by: NURSE PRACTITIONER

## 2024-05-13 PROCEDURE — 82728 ASSAY OF FERRITIN: CPT | Mod: ,,, | Performed by: CLINICAL MEDICAL LABORATORY

## 2024-05-13 PROCEDURE — 83540 ASSAY OF IRON: CPT | Mod: ,,, | Performed by: CLINICAL MEDICAL LABORATORY

## 2024-05-13 PROCEDURE — 99214 OFFICE O/P EST MOD 30 MIN: CPT | Mod: ,,, | Performed by: NURSE PRACTITIONER

## 2024-05-13 PROCEDURE — 80053 COMPREHEN METABOLIC PANEL: CPT | Mod: ,,, | Performed by: CLINICAL MEDICAL LABORATORY

## 2024-05-13 PROCEDURE — 83036 HEMOGLOBIN GLYCOSYLATED A1C: CPT | Mod: ,,, | Performed by: CLINICAL MEDICAL LABORATORY

## 2024-05-13 PROCEDURE — 3077F SYST BP >= 140 MM HG: CPT | Mod: ,,, | Performed by: NURSE PRACTITIONER

## 2024-05-13 RX ORDER — SIMVASTATIN 20 MG/1
20 TABLET, FILM COATED ORAL NIGHTLY
Qty: 90 TABLET | Refills: 1 | Status: SHIPPED | OUTPATIENT
Start: 2024-05-13

## 2024-05-13 RX ORDER — PANTOPRAZOLE SODIUM 40 MG/1
40 TABLET, DELAYED RELEASE ORAL DAILY
Qty: 90 TABLET | Refills: 1 | Status: SHIPPED | OUTPATIENT
Start: 2024-05-13

## 2024-05-13 RX ORDER — AMOXICILLIN 500 MG/1
500 TABLET, FILM COATED ORAL EVERY 12 HOURS
Qty: 20 TABLET | Refills: 0 | Status: SHIPPED | OUTPATIENT
Start: 2024-05-13 | End: 2024-05-23

## 2024-05-13 RX ORDER — AMLODIPINE BESYLATE 10 MG/1
10 TABLET ORAL DAILY
Qty: 90 TABLET | Refills: 1 | Status: SHIPPED | OUTPATIENT
Start: 2024-05-13

## 2024-05-14 PROBLEM — J45.50 SEVERE PERSISTENT ASTHMA WITHOUT COMPLICATION: Status: RESOLVED | Noted: 2022-05-13 | Resolved: 2024-05-14

## 2024-05-14 PROBLEM — M79.605 BILATERAL LOWER EXTREMITY PAIN: Status: RESOLVED | Noted: 2021-06-25 | Resolved: 2024-05-14

## 2024-05-14 PROBLEM — Z12.4 ENCOUNTER FOR PAP CERVICAL SMEAR FOLLOWING PRIOR ABNORMAL SMEAR: Status: RESOLVED | Noted: 2021-08-09 | Resolved: 2024-05-14

## 2024-05-14 PROBLEM — M79.604 BILATERAL LOWER EXTREMITY PAIN: Status: RESOLVED | Noted: 2021-06-25 | Resolved: 2024-05-14

## 2024-05-14 PROBLEM — S40.011A CONTUSION OF RIGHT SHOULDER: Status: RESOLVED | Noted: 2024-02-27 | Resolved: 2024-05-14

## 2024-05-14 PROBLEM — M25.511 ACUTE PAIN OF RIGHT SHOULDER: Status: RESOLVED | Noted: 2024-02-27 | Resolved: 2024-05-14

## 2024-05-14 NOTE — PROGRESS NOTES
GLENIS Sanchez   [facility] [address] [place] [address]  [phone]      PATIENT NAME: Yaa Frank  : 1970  DATE: 24  MRN: [id number]      Billing Provider: GLENIS Sanchez  Level of Service:   Patient PCP Information       Provider PCP Type    GLENIS Sanchez General            Reason for Visit / Chief Complaint: Follow-up, Hypertension, and Hyperlipidemia       Update PCP  Update Chief Complaint         History of Present Illness / Problem Focused Workflow     Yaa Frank presents to the clinic with Follow-up, Hypertension, and Hyperlipidemia     Pt presents for routine follow up with lab and med refills. Overall doing well.      BP appears not at goal today. Home meds reviewed, will add norvasc 10mg po daily   Advised to monitor BP at home. Advised on optimal BP readings - SBP < 130 & DBP < 80. Advised to call office for any persistent BP elevation and may have to prescribe or adjust BP med(s).  Recommended DASH diet, stay well hydrated with water daily, eliminate or decrease caffeinated and high calorie drinks, increase physical activity, and lose weight if BMI > 25.0. Written patient education information provided to patient with goals and recommendations to assist with BP management.     Discussed need for low fat/low cholesterol diet, regular exercise, and weight control.   Cardiovascular risk and specific lipid/LDL goals reviewed.      Diabetes treatment goals: (unless otherwise indicated due to risk factor stratification)  To achieve normal or near normal glucose levels.  Fasting glucose:   Before meals: 100-140  2 hours after meal:less 130  Bedtime goal glucose > 100              Review of Systems     Review of Systems   Constitutional:  Negative for fatigue and fever.   HENT:  Negative for nasal congestion and sore throat.    Eyes:  Negative for visual  disturbance.   Respiratory:  Negative for chest tightness and shortness of breath.    Cardiovascular:  Negative for chest pain and leg swelling.   Gastrointestinal:  Negative for abdominal pain and change in bowel habit.   Endocrine: Negative for polydipsia, polyphagia and polyuria.   Genitourinary:  Negative for dysuria and hematuria.   Musculoskeletal:  Negative for back pain and leg pain.   Integumentary:  Negative for rash.   Neurological:  Negative for dizziness, syncope, weakness and light-headedness.        Medical / Social / Family History     Past Medical History:   Diagnosis Date    Asthma     CHF (congestive heart failure)     COVID-19     Disorder of kidney and ureter        Past Surgical History:   Procedure Laterality Date    APPENDECTOMY       SECTION      CHOLECYSTECTOMY      LAPAROSCOPIC GASTRIC BANDING      PLACEMENT OF DIALYSIS ACCESS Left        Social History  Ms. Frank  reports that she has never smoked. She has never used smokeless tobacco. She reports that she does not currently use alcohol. She reports that she does not use drugs.    Family History  Ms. Frank's family history includes Breast cancer in her cousin and paternal aunt.    Medications and Allergies     Medications  Outpatient Medications Marked as Taking for the 24 encounter (Office Visit) with Corie Juárez ACNP   Medication Sig Dispense Refill    albuterol (PROVENTIL/VENTOLIN HFA) 90 mcg/actuation inhaler Inhale 2 puffs into the lungs every 4 to 6 hours as needed.      albuterol-ipratropium (DUO-NEB) 2.5 mg-0.5 mg/3 mL nebulizer solution       carvediloL (COREG) 6.25 MG tablet Take 6.25 mg by mouth 2 (two) times daily with meals.      cinacalcet (SENSIPAR) 30 MG Tab Take 30 mg by mouth every evening.      midodrine (PROAMATINE) 5 MG Tab Take 5 mg by mouth 2 (two) times daily as needed. Only take on dialysis days      sevelamer carbonate (RENVELA) 800 mg Tab Take 800 mg by mouth 4 (four) times daily with  meals and nightly. 4 TABLETS WITH EACH MEAL. 2 WITH EACH SNACK.      WIXELA INHUB 250-50 mcg/dose diskus inhaler Inhale 1 puff into the lungs 2 (two) times a day.      [DISCONTINUED] amoxicillin (AMOXIL) 500 MG capsule Take before dental procedure         Allergies  Review of patient's allergies indicates:   Allergen Reactions    Ancef [cefazolin]     Hydralazine     Hydralazine analogues     Latex, natural rubber        Physical Examination     Vitals:    05/13/24 1333   BP: (!) 155/87   Pulse: 84   Resp: 20   Temp: 97.7 °F (36.5 °C)     Physical Exam  Eyes:      Pupils: Pupils are equal, round, and reactive to light.   Cardiovascular:      Rate and Rhythm: Normal rate and regular rhythm.      Heart sounds: Normal heart sounds. No murmur heard.  Pulmonary:      Breath sounds: Normal breath sounds. No wheezing, rhonchi or rales.   Abdominal:      General: Bowel sounds are normal.      Palpations: Abdomen is soft.   Musculoskeletal:         General: No swelling.      Cervical back: Normal range of motion and neck supple.   Skin:     General: Skin is warm and dry.   Neurological:      Mental Status: She is alert and oriented to person, place, and time.          Lab Results   Component Value Date    WBC 4.91 05/13/2024    HGB 9.4 (L) 05/13/2024    HCT 31.0 (L) 05/13/2024    MCV 93.7 05/13/2024     05/13/2024        Sodium   Date Value Ref Range Status   05/13/2024 141 136 - 145 mmol/L Final     Potassium   Date Value Ref Range Status   05/13/2024 3.3 (L) 3.5 - 5.1 mmol/L Final     Chloride   Date Value Ref Range Status   05/13/2024 97 (L) 98 - 107 mmol/L Final     CO2   Date Value Ref Range Status   05/13/2024 38 (H) 21 - 32 mmol/L Final     Glucose   Date Value Ref Range Status   05/13/2024 96 74 - 106 mg/dL Final     BUN   Date Value Ref Range Status   05/13/2024 26 (H) 7 - 18 mg/dL Final     Creatinine   Date Value Ref Range Status   05/13/2024 9.30 (H) 0.55 - 1.02 mg/dL Final     Calcium   Date Value Ref  "Range Status   05/13/2024 9.1 8.5 - 10.1 mg/dL Final     Total Protein   Date Value Ref Range Status   05/13/2024 7.9 6.4 - 8.2 g/dL Final     Albumin   Date Value Ref Range Status   05/13/2024 3.5 3.5 - 5.0 g/dL Final     Bilirubin, Total   Date Value Ref Range Status   05/13/2024 0.5 >0.0 - 1.2 mg/dL Final     Alk Phos   Date Value Ref Range Status   05/13/2024 84 41 - 108 U/L Final     AST   Date Value Ref Range Status   05/13/2024 12 (L) 15 - 37 U/L Final     ALT   Date Value Ref Range Status   05/13/2024 12 (L) 13 - 56 U/L Final     Anion Gap   Date Value Ref Range Status   05/13/2024 9 7 - 16 mmol/L Final     eGFR   Date Value Ref Range Status   05/13/2024 5 (L) >=60 mL/min/1.73m2 Final      Lab Results   Component Value Date    HGBA1C 4.7 05/13/2024      Lab Results   Component Value Date    CHOL 119 05/13/2024    CHOL 133 06/27/2023    CHOL 135 12/21/2022     Lab Results   Component Value Date    HDL 44 05/13/2024    HDL 46 06/27/2023    HDL 49 12/21/2022     Lab Results   Component Value Date    LDLCALC 56 05/13/2024    LDLCALC 52 06/27/2023    LDLCALC 66 12/21/2022     No results found for: "DLDL"  Lab Results   Component Value Date    TRIG 97 05/13/2024    TRIG 175 (H) 06/27/2023    TRIG 101 12/21/2022     Lab Results   Component Value Date    CHOLHDL 2.7 05/13/2024    CHOLHDL 2.9 06/27/2023    CHOLHDL 2.8 12/21/2022      Lab Results   Component Value Date    TSH 6.820 (H) 12/21/2022    FREET4 1.05 12/21/2022        Assessment and Plan (including Health Maintenance)      Problem List  Smart Sets  Document Outside HM   :    Plan:     1. Essential (primary) hypertension  Assessment & Plan:  BP Readings from Last 3 Encounters:   05/13/24 (!) 155/87   02/26/24 (!) 138/91   06/27/23 111/70    The current medical regimen is effective;  continue present plan and medications.  Goal less 130/80  Add norvasc 10mg po daily    Orders:  -     Cancel: CBC Auto Differential; Future; Expected date: 05/13/2024  -     " Comprehensive Metabolic Panel; Future; Expected date: 05/13/2024  -     Microalbumin/Creatinine Ratio, Urine; Future; Expected date: 05/13/2024    2. Hyperlipidemia, unspecified hyperlipidemia type  -     Lipid Panel; Future; Expected date: 05/13/2024    3. IFG (impaired fasting glucose)  -     Hemoglobin A1C; Future; Expected date: 05/13/2024    4. Anemia, unspecified type  -     CBC Auto Differential; Future; Expected date: 05/13/2024  -     Iron and TIBC; Future; Expected date: 05/13/2024  -     Ferritin; Future; Expected date: 05/13/2024    5. Chronic heart failure, unspecified heart failure type  Overview:  Followed by cardiology Dr Sarahi Wassermanscchloe Winters       6. Peripheral vascular disease, unspecified    7. ESRD (end stage renal disease) on dialysis  Assessment & Plan:  Lab Results   Component Value Date    CREATININE 9.30 (H) 05/13/2024    BUN 26 (H) 05/13/2024     05/13/2024    K 3.3 (L) 05/13/2024    CL 97 (L) 05/13/2024    CO2 38 (H) 05/13/2024    On dialysis TTSat  Dr Schumacher in [place]       Other orders  -     pantoprazole (PROTONIX) 40 MG tablet; Take 1 tablet (40 mg total) by mouth once daily.  Dispense: 90 tablet; Refill: 1  -     simvastatin (ZOCOR) 20 MG tablet; Take 1 tablet (20 mg total) by mouth every evening.  Dispense: 90 tablet; Refill: 1  -     amLODIPine (NORVASC) 10 MG tablet; Take 1 tablet (10 mg total) by mouth once daily.  Dispense: 90 tablet; Refill: 1  -     amoxicillin (AMOXIL) 500 MG Tab; Take 1 tablet (500 mg total) by mouth every 12 (twelve) hours. for 10 days  Dispense: 20 tablet; Refill: 0         There are no Patient Instructions on file for this visit.     Health Maintenance Due   Topic Date Due    Diabetes Urine Screening  Never done         Health Maintenance Topics with due status: Not Due       Topic Last Completion Date    Cervical Cancer Screening 08/09/2021    Colorectal Cancer Screening 07/20/2022    Influenza Vaccine 10/10/2022    TETANUS VACCINE 06/05/2023     Mammogram 03/27/2024    Lipid Panel 05/13/2024    Hemoglobin A1c 05/13/2024       Future Appointments   Date Time Provider Department Center   6/10/2024  8:30 AM ROSALIE NURSE, [facility]   9/4/2024 10:40 AM Corie Juárez ACNP [facility] [place]   4/2/2025 11:00 AM [facility]            Signature:  GLENIS Sanchez [facility] [address] [place] [address]  [phone]    Date of encounter: 5/13/24

## 2024-05-14 NOTE — ASSESSMENT & PLAN NOTE
Lab Results   Component Value Date    CREATININE 9.30 (H) 05/13/2024    BUN 26 (H) 05/13/2024     05/13/2024    K 3.3 (L) 05/13/2024    CL 97 (L) 05/13/2024    CO2 38 (H) 05/13/2024    On dialysis TTSat  Dr Schumacher in [place]

## 2024-05-14 NOTE — ASSESSMENT & PLAN NOTE
BP Readings from Last 3 Encounters:   05/13/24 (!) 155/87   02/26/24 (!) 138/91   06/27/23 111/70    The current medical regimen is effective;  continue present plan and medications.  Goal less 130/80  Add norvasc 10mg po daily

## 2024-05-30 RX ORDER — GABAPENTIN 100 MG/1
100 CAPSULE ORAL 2 TIMES DAILY
Qty: 180 CAPSULE | Refills: 1 | Status: SHIPPED | OUTPATIENT
Start: 2024-05-30 | End: 2024-11-26

## 2024-05-30 NOTE — TELEPHONE ENCOUNTER
----- Message from Luh Pinon sent at 5/30/2024  3:51 PM CDT -----  Regarding: REFILL  Who Called: Yaa Frank    Refill or New Rx:Refill  RX Name and Strength:GABAPENTIN  How is the patient currently taking it? (ex. 1XDay):  Is this a 30 day or 90 day RX:  Local or Mail Order:LOCAL  List of preferred pharmacies on file (remove unneeded): [facility]  If different Pharmacy is requested, enter Pharmacy information here including location and phone number: [facility]   Ordering Provider:KENJI      Preferred Method of Contact: Phone Call  Patient's Preferred Phone Number on File: [phone]   Best Call Back Number, if different:  Additional Information: PATIENT SAYS SHE HAS NOT HAD THIS MEDICATION IN A MONTH

## 2024-06-10 ENCOUNTER — OFFICE VISIT (OUTPATIENT)
Dept: FAMILY MEDICINE | Facility: CLINIC | Age: 54
End: 2024-06-10
Payer: MEDICARE

## 2024-06-10 VITALS
DIASTOLIC BLOOD PRESSURE: 88 MMHG | OXYGEN SATURATION: 95 % | WEIGHT: 293 LBS | HEART RATE: 65 BPM | BODY MASS INDEX: 53.92 KG/M2 | SYSTOLIC BLOOD PRESSURE: 138 MMHG | RESPIRATION RATE: 20 BRPM | HEIGHT: 62 IN

## 2024-06-10 DIAGNOSIS — Z74.09 OTHER REDUCED MOBILITY: ICD-10-CM

## 2024-06-10 DIAGNOSIS — Z00.00 ENCOUNTER FOR SUBSEQUENT ANNUAL WELLNESS VISIT (AWV) IN MEDICARE PATIENT: Primary | ICD-10-CM

## 2024-06-10 DIAGNOSIS — I73.9 PERIPHERAL VASCULAR DISEASE, UNSPECIFIED: ICD-10-CM

## 2024-06-10 DIAGNOSIS — J45.50 SEVERE PERSISTENT ASTHMA WITHOUT COMPLICATION: ICD-10-CM

## 2024-06-10 DIAGNOSIS — N18.6 ESRD (END STAGE RENAL DISEASE) ON DIALYSIS: ICD-10-CM

## 2024-06-10 DIAGNOSIS — I10 ESSENTIAL (PRIMARY) HYPERTENSION: ICD-10-CM

## 2024-06-10 DIAGNOSIS — G47.33 OSA TREATED WITH BIPAP: ICD-10-CM

## 2024-06-10 DIAGNOSIS — E78.5 HYPERLIPIDEMIA, UNSPECIFIED HYPERLIPIDEMIA TYPE: ICD-10-CM

## 2024-06-10 DIAGNOSIS — E66.01 MORBID OBESITY: ICD-10-CM

## 2024-06-10 DIAGNOSIS — Z99.2 ESRD (END STAGE RENAL DISEASE) ON DIALYSIS: ICD-10-CM

## 2024-06-10 DIAGNOSIS — I50.9 CHRONIC HEART FAILURE, UNSPECIFIED HEART FAILURE TYPE: ICD-10-CM

## 2024-06-10 PROCEDURE — 1159F MED LIST DOCD IN RCRD: CPT | Mod: ,,, | Performed by: NURSE PRACTITIONER

## 2024-06-10 PROCEDURE — 3044F HG A1C LEVEL LT 7.0%: CPT | Mod: ,,, | Performed by: NURSE PRACTITIONER

## 2024-06-10 PROCEDURE — 1160F RVW MEDS BY RX/DR IN RCRD: CPT | Mod: ,,, | Performed by: NURSE PRACTITIONER

## 2024-06-10 PROCEDURE — 3075F SYST BP GE 130 - 139MM HG: CPT | Mod: ,,, | Performed by: NURSE PRACTITIONER

## 2024-06-10 PROCEDURE — 3079F DIAST BP 80-89 MM HG: CPT | Mod: ,,, | Performed by: NURSE PRACTITIONER

## 2024-06-10 PROCEDURE — G0439 PPPS, SUBSEQ VISIT: HCPCS | Mod: ,,, | Performed by: NURSE PRACTITIONER

## 2024-06-10 NOTE — PROGRESS NOTES
"   [facility]       PATIENT NAME: Yaa Frank   : 1970    AGE: 54 y.o. DATE: 06/10/2024   MRN: [id number]        Yaa Frank presented for a  Medicare AWV and comprehensive Health Risk Assessment today. The following components were reviewed and updated:    Medical history  Family History  Social history  Allergies and Current Medications  Health Risk Assessment  Health Maintenance  Care Team         ** See Completed Assessments for Annual Wellness Visit within the encounter summary.**         The following assessments were completed:  Living Situation  CAGE  Depression Screening  Timed Get Up and Go  Whisper Test  Cognitive Function Screening  Nutrition Screening  ADL Screening  PAQ Screening      Opioid documentation:      Patient does not have a current opioid prescription.        Vitals:    06/10/24 1309   BP: (!) 149/93   BP Location: Left arm   Patient Position: Sitting   BP Method: Large (Automatic)   Pulse: 65   Resp: 20   SpO2: 95%   Weight: 136.1 kg (300 lb)   Height: 5' 2" (1.575 m)     Body mass index is 54.87 kg/m².  Physical Exam  Constitutional:       General: She is not in acute distress.     Appearance: Normal appearance.   HENT:      Head: Normocephalic.   Eyes:      Pupils: Pupils are equal, round, and reactive to light.   Cardiovascular:      Rate and Rhythm: Normal rate and regular rhythm.      Heart sounds: Normal heart sounds. No murmur heard.  Pulmonary:      Effort: Pulmonary effort is normal.      Breath sounds: Normal breath sounds.   Abdominal:      General: Bowel sounds are normal. There is no distension.      Hernia: No hernia is present.   Musculoskeletal:         General: No swelling or tenderness.      Right lower leg: No edema.      Left lower leg: No edema.   Skin:     General: Skin is warm and dry.   Neurological:      General: No focal deficit present.      Mental Status: She is alert and oriented to " Vaccine Information Statement    Influenza (Flu) Vaccine (Inactivated or Recombinant): What You Need to Know    Many vaccine information statements are available in Frisian and other languages. See www.immunize.org/vis. Hojas de información sobre vacunas están disponibles en español y en muchos otros idiomas. Visite www.immunize.org/vis. 1. Why get vaccinated? Influenza vaccine can prevent influenza (flu). Flu is a contagious disease that spreads around the [facility] every year, usually between October and May. Anyone can get the flu, but it is more dangerous for some people. Infants and young children, people 72 years and older, pregnant people, and people with certain health conditions or a weakened immune system are at greatest risk of flu complications. Pneumonia, bronchitis, sinus infections, and ear infections are examples of flu-related complications. If you have a medical condition, such as heart disease, cancer, or diabetes, flu can make it worse. Flu can cause fever and chills, sore throat, muscle aches, fatigue, cough, headache, and runny or stuffy nose. Some people may have vomiting and diarrhea, though this is more common in children than adults. In an average year, thousands of people in the [facility] die from flu, and many more are hospitalized. Flu vaccine prevents millions of illnesses and flu-related visits to the doctor each year. 2. Influenza vaccines     CDC recommends everyone 6 months and older get vaccinated every flu season. Children 6 months through 6years of age may need 2 doses during a single flu season. Everyone else needs only 1 dose each flu season. It takes about 2 weeks for protection to develop after vaccination. There are many flu viruses, and they are always changing. Each year a new flu vaccine is made to protect against the influenza viruses believed to be likely to cause disease in the upcoming flu season.  Even when the vaccine doesnt exactly match these viruses, it may still provide some protection. Influenza vaccine does not cause flu. Influenza vaccine may be given at the same time as other vaccines. 3. Talk with your health care provider    Tell your vaccination provider if the person getting the vaccine:   Has had an allergic reaction after a previous dose of influenza vaccine, or has any severe, life-threatening allergies    Has ever had Guillain-Barré Syndrome (also called GBS)    In some cases, your health care provider may decide to postpone influenza vaccination until a future visit. Influenza vaccine can be administered at any time during pregnancy. People who are or will be pregnant during influenza season should receive inactivated influenza vaccine. People with minor illnesses, such as a cold, may be vaccinated. People who are moderately or severely ill should usually wait until they recover before getting influenza vaccine. Your health care provider can give you more information. 4. Risks of a vaccine reaction     Soreness, redness, and swelling where the shot is given, fever, muscle aches, and headache can happen after influenza vaccination.  There may be a very small increased risk of Guillain-Barré Syndrome (GBS) after inactivated influenza vaccine (the flu shot). Crissy Rodriguez children who get the flu shot along with pneumococcal vaccine (PCV13) and/or DTaP vaccine at the same time might be slightly more likely to have a seizure caused by fever. Tell your health care provider if a child who is getting flu vaccine has ever had a seizure. People sometimes faint after medical procedures, including vaccination. Tell your provider if you feel dizzy or have vision changes or ringing in the ears. As with any medicine, there is a very remote chance of a vaccine causing a severe allergic reaction, other serious injury, or death. 5. What if there is a serious problem?     An allergic reaction could occur person, place, and time.          Diagnoses and health risks identified today and associated recommendations/orders:    1. Encounter for subsequent annual wellness visit (AWV) in Medicare patient  Gave appt reminder for next year's AWV    2. Essential (primary) hypertension  BP Readings from Last 3 Encounters:   06/10/24 138/88   05/13/24 (!) 155/87   02/26/24 (!) 138/91    The current medical regimen is effective;  continue present plan and medications.    3. Hyperlipidemia, unspecified hyperlipidemia type  Lab Results   Component Value Date    CHOL 119 05/13/2024    CHOL 133 06/27/2023    CHOL 135 12/21/2022     Lab Results   Component Value Date    HDL 44 05/13/2024    HDL 46 06/27/2023    HDL 49 12/21/2022     Lab Results   Component Value Date    LDLCALC 56 05/13/2024    LDLCALC 52 06/27/2023    LDLCALC 66 12/21/2022     Lab Results   Component Value Date    TRIG 97 05/13/2024    TRIG 175 (H) 06/27/2023    TRIG 101 12/21/2022       Lab Results   Component Value Date    CHOLHDL 2.7 05/13/2024    CHOLHDL 2.9 06/27/2023    CHOLHDL 2.8 12/21/2022    The current medical regimen is effective;  continue present plan and medications.    4. Chronic heart failure, unspecified heart failure type  Followed by [facility]    5. ESRD (end stage renal disease) on dialysis  The current medical regimen is effective;  continue present plan and medications.    6. FERMIN treated with BiPAP  The current medical regimen is effective;  continue present plan and medications.    7. Severe persistent asthma without complication  Cont home O2 as needed  The current medical regimen is effective;  continue present plan and medications.    8. Peripheral vascular disease, unspecified  The current medical regimen is effective;  continue present plan and medications.    9. Morbid obesity  Encouraged diet & exercise to decrease weight/BMI    10. BMI 50.0-59.9, adult    11. Other reduced mobility  Prevent falls by wearing good non-skid shoes.      Provided  Yaa with a 5-10 year written screening schedule and personal prevention plan. Recommendations were developed using the USPSTF age appropriate recommendations. Education, counseling, and referrals were provided as needed. After Visit Summary printed and given to patient which includes a list of additional screenings\tests needed.    Follow up in about 1 year (around 6/10/2025) for AWV follow-up.    Carrie Estrada RN    I offered to discuss advanced care planning, including how to pick a person who would make decisions for you if you were unable to make them for yourself, called a health care power of , and what kind of decisions you might make such as use of life sustaining treatments such as ventilators and tube feeding when faced with a life limiting illness recorded on a living will that they will need to know. (How you want to be cared for as you near the end of your natural life)     X Patient is interested in learning more about how to make advanced directives.  I provided them paperwork and offered to discuss this with them.    Signature: GLENIS Uriarte     after the vaccinated person leaves the clinic. If you see signs of a severe allergic reaction (hives, swelling of the face and throat, difficulty breathing, a fast heartbeat, dizziness, or weakness), call 9-1-1 and get the person to the nearest hospital.    For other signs that concern you, call your health care provider. Adverse reactions should be reported to the Vaccine Adverse Event Reporting System (VAERS). Your health care provider will usually file this report, or you can do it yourself. Visit the VAERS website at www.vaers. [facility].gov or call 1-402.111.9341. VAERS is only for reporting reactions, and VAERS staff members do not give medical advice. 6. The National Vaccine Injury Compensation Program    The [facility] Vaccine Injury Compensation Program (VICP) is a federal program that was created to compensate people who may have been injured by certain vaccines. Claims regarding alleged injury or death due to vaccination have a time limit for filing, which may be as short as two years. Visit the VICP website at www.[facility].gov/vaccinecompensation or call 3-735.983.4355 to learn about the program and about filing a claim. 7. How can I learn more?  Ask your health care provider.  Call your local or state health department.  Visit the website of the Food and Drug Administration (FDA) for vaccine package inserts and additional information at www.fda.gov/vaccines-blood-biologics/vaccines.  Contact the Centers for Disease Control and Prevention (CDC):  - Call 4-260.808.8586 (1-800-CDC-INFO) or  - Visit CDCs influenza website at www.cdc.gov/flu. Vaccine Information Statement   Inactivated Influenza Vaccine   8/6/2021  [address] Veena Nereida [id number]   Department of Health and Human Services  Centers for Disease Control and Prevention    Office Use Only

## 2024-06-10 NOTE — PATIENT INSTRUCTIONS
Counseling and Referral of Other Preventative  (Italic type indicates deductible and co-insurance are waived)    Patient Name: Yaa Frank  Today's Date: 6/10/2024    Health Maintenance       Date Due Completion Date    Diabetes Urine Screening Never done ---    Shingles Vaccine (2 of 2) 05/09/2025 (Originally 7/31/2023) 6/5/2023    Influenza Vaccine (Season Ended) 09/01/2024 10/10/2022 (Done)    Override on 10/10/2022: Done    Mammogram 03/27/2025 3/27/2024    Hemoglobin A1c 05/13/2025 5/13/2024    Cervical Cancer Screening 08/09/2026 8/9/2021    Lipid Panel 05/13/2029 5/13/2024    Colorectal Cancer Screening 07/20/2032 7/20/2022    TETANUS VACCINE 06/05/2033 6/5/2023        No orders of the defined types were placed in this encounter.    The following information is provided to all patients.  This information is to help you find resources for any of the problems found today that may be affecting your health:                  Living healthy guide: [facility] Money[facility].gov    Understanding Diabetes: www.diabetes.org      Eating healthy: www.cdc.gov/healthyweight      CDC home safety checklist: www.cdc.gov/steadi/patient.html      Agency on Aging: [facility].org    Alcoholics anonymous (AA): www.aa.org      Physical Activity: www.breana.nih.gov/am2mjvu      Tobacco use: [facility] Technology[facility]alth.gov

## 2024-09-04 ENCOUNTER — OFFICE VISIT (OUTPATIENT)
Dept: FAMILY MEDICINE | Facility: CLINIC | Age: 54
End: 2024-09-04
Payer: MEDICARE

## 2024-09-04 VITALS
BODY MASS INDEX: 53.43 KG/M2 | HEART RATE: 75 BPM | OXYGEN SATURATION: 93 % | SYSTOLIC BLOOD PRESSURE: 152 MMHG | TEMPERATURE: 98 F | DIASTOLIC BLOOD PRESSURE: 87 MMHG | WEIGHT: 290.38 LBS | RESPIRATION RATE: 18 BRPM | HEIGHT: 62 IN

## 2024-09-04 DIAGNOSIS — I10 ESSENTIAL (PRIMARY) HYPERTENSION: Primary | ICD-10-CM

## 2024-09-04 DIAGNOSIS — E78.5 HYPERLIPIDEMIA, UNSPECIFIED HYPERLIPIDEMIA TYPE: ICD-10-CM

## 2024-09-04 DIAGNOSIS — Z23 INFLUENZA VACCINATION ADMINISTERED AT CURRENT VISIT: ICD-10-CM

## 2024-09-04 DIAGNOSIS — N18.6 ESRD (END STAGE RENAL DISEASE) ON DIALYSIS: ICD-10-CM

## 2024-09-04 DIAGNOSIS — Z99.2 ESRD (END STAGE RENAL DISEASE) ON DIALYSIS: ICD-10-CM

## 2024-09-04 LAB
ALBUMIN SERPL BCP-MCNC: 3.6 G/DL (ref 3.5–5)
ALBUMIN/GLOB SERPL: 0.9 {RATIO}
ALP SERPL-CCNC: 71 U/L (ref 41–108)
ALT SERPL W P-5'-P-CCNC: 15 U/L (ref 13–56)
ANION GAP SERPL CALCULATED.3IONS-SCNC: 8 MMOL/L (ref 7–16)
AST SERPL W P-5'-P-CCNC: 12 U/L (ref 15–37)
BILIRUB SERPL-MCNC: 0.6 MG/DL (ref ?–1.2)
BUN SERPL-MCNC: 17 MG/DL (ref 7–18)
BUN/CREAT SERPL: 2 (ref 6–20)
CALCIUM SERPL-MCNC: 9 MG/DL (ref 8.5–10.1)
CHLORIDE SERPL-SCNC: 97 MMOL/L (ref 98–107)
CHOLEST SERPL-MCNC: 109 MG/DL (ref 0–200)
CHOLEST/HDLC SERPL: 2.3 {RATIO}
CO2 SERPL-SCNC: 38 MMOL/L (ref 21–32)
CREAT SERPL-MCNC: 7.64 MG/DL (ref 0.55–1.02)
EGFR (NO RACE VARIABLE) (RUSH/TITUS): 6 ML/MIN/1.73M2
GLOBULIN SER-MCNC: 4 G/DL (ref 2–4)
GLUCOSE SERPL-MCNC: 88 MG/DL (ref 74–106)
HDLC SERPL-MCNC: 48 MG/DL (ref 40–60)
LDLC SERPL CALC-MCNC: 45 MG/DL
NONHDLC SERPL-MCNC: 61 MG/DL
POTASSIUM SERPL-SCNC: 3.8 MMOL/L (ref 3.5–5.1)
PROT SERPL-MCNC: 7.6 G/DL (ref 6.4–8.2)
SODIUM SERPL-SCNC: 139 MMOL/L (ref 136–145)
TRIGL SERPL-MCNC: 81 MG/DL (ref 35–150)
VLDLC SERPL-MCNC: 16 MG/DL

## 2024-09-04 PROCEDURE — 3044F HG A1C LEVEL LT 7.0%: CPT | Mod: ,,, | Performed by: NURSE PRACTITIONER

## 2024-09-04 PROCEDURE — 3077F SYST BP >= 140 MM HG: CPT | Mod: ,,, | Performed by: NURSE PRACTITIONER

## 2024-09-04 PROCEDURE — 80053 COMPREHEN METABOLIC PANEL: CPT | Mod: ,,, | Performed by: CLINICAL MEDICAL LABORATORY

## 2024-09-04 PROCEDURE — 3079F DIAST BP 80-89 MM HG: CPT | Mod: ,,, | Performed by: NURSE PRACTITIONER

## 2024-09-04 PROCEDURE — 1159F MED LIST DOCD IN RCRD: CPT | Mod: ,,, | Performed by: NURSE PRACTITIONER

## 2024-09-04 PROCEDURE — 80061 LIPID PANEL: CPT | Mod: ,,, | Performed by: CLINICAL MEDICAL LABORATORY

## 2024-09-04 PROCEDURE — 99213 OFFICE O/P EST LOW 20 MIN: CPT | Mod: 25,,, | Performed by: NURSE PRACTITIONER

## 2024-09-04 PROCEDURE — 3008F BODY MASS INDEX DOCD: CPT | Mod: ,,, | Performed by: NURSE PRACTITIONER

## 2024-09-04 PROCEDURE — G0008 ADMIN INFLUENZA VIRUS VAC: HCPCS | Mod: ,,, | Performed by: NURSE PRACTITIONER

## 2024-09-04 PROCEDURE — 90656 IIV3 VACC NO PRSV 0.5 ML IM: CPT | Mod: ,,, | Performed by: NURSE PRACTITIONER

## 2024-09-04 PROCEDURE — 1160F RVW MEDS BY RX/DR IN RCRD: CPT | Mod: ,,, | Performed by: NURSE PRACTITIONER

## 2024-09-04 RX ORDER — SIMVASTATIN 20 MG/1
20 TABLET, FILM COATED ORAL NIGHTLY
Qty: 90 TABLET | Refills: 1 | Status: SHIPPED | OUTPATIENT
Start: 2024-09-04

## 2024-09-04 RX ORDER — PANTOPRAZOLE SODIUM 40 MG/1
40 TABLET, DELAYED RELEASE ORAL DAILY
Qty: 90 TABLET | Refills: 1 | Status: SHIPPED | OUTPATIENT
Start: 2024-09-04

## 2024-09-04 RX ORDER — AMLODIPINE BESYLATE 10 MG/1
10 TABLET ORAL DAILY
Qty: 90 TABLET | Refills: 1 | Status: CANCELLED | OUTPATIENT
Start: 2024-09-04

## 2024-09-04 RX ORDER — FLUTICASONE PROPIONATE 50 MCG
1 SPRAY, SUSPENSION (ML) NASAL DAILY
Qty: 16 G | Refills: 3 | Status: SHIPPED | OUTPATIENT
Start: 2024-09-04

## 2024-09-04 RX ORDER — OFLOXACIN 3 MG/ML
5 SOLUTION AURICULAR (OTIC) DAILY
Qty: 5 ML | Refills: 0 | Status: SHIPPED | OUTPATIENT
Start: 2024-09-04

## 2024-09-04 RX ORDER — DOXYCYCLINE 100 MG/1
100 CAPSULE ORAL 2 TIMES DAILY
Qty: 14 CAPSULE | Refills: 0 | Status: SHIPPED | OUTPATIENT
Start: 2024-09-04 | End: 2024-09-11

## 2024-09-04 NOTE — PROGRESS NOTES
GLENIS Sanchez   [facility] [address] [place] [address]  [phone]      PATIENT NAME: Yaa Frank  : 1970  DATE: 24  MRN: [id number]      Billing Provider: GLENIS Sanchez  Level of Service:   Patient PCP Information       Provider PCP Type    GLENIS Sanchez General            Reason for Visit / Chief Complaint: Hypertension, Hyperlipidemia, Follow-up, and Otalgia (3 mos)       Update PCP  Update Chief Complaint         History of Present Illness / Problem Focused Workflow     Yaa Frank presents to the clinic with Hypertension, Hyperlipidemia, Follow-up, and Otalgia (3 mos)     Pt presents for routine follow up with lab and med refills. Overall doing well.      BP appears  controlled today. Home meds reviewed  The current medical regimen is effective;  continue present plan and medications. Recommended patient to check home readings to monitor and see me for followup as scheduled or sooner as needed.   Discussed sodium restriction, maintaining ideal body weight and regular exercise program as physiologic means to continue to achieve blood pressure control in addition to medication compliance.  Patient was educated that both decongestant and anti-inflammatory medication may raise blood pressure.  Advised to monitor BP at home. Advised on optimal BP readings - SBP < 130 & DBP < 80. Advised to call office for any persistent BP elevation and may have to prescribe or adjust BP med(s).  Recommended DASH diet, stay well hydrated with water daily, eliminate or decrease caffeinated and high calorie drinks, increase physical activity, and lose weight if BMI > 25.0. Written patient education information provided to patient with goals and recommendations to assist with BP management.     Discussed need for low fat/low cholesterol diet, regular exercise, and weight control.    Cardiovascular risk and specific lipid/LDL goals reviewed.        Review of Systems     Review of Systems   Constitutional:  Negative for fatigue and fever.   HENT:  Negative for nasal congestion and sore throat.    Eyes:  Negative for visual disturbance.   Respiratory:  Negative for chest tightness and shortness of breath.    Cardiovascular:  Negative for chest pain and leg swelling.   Gastrointestinal:  Negative for abdominal pain and change in bowel habit.   Endocrine: Negative for polydipsia, polyphagia and polyuria.   Musculoskeletal:  Negative for back pain and leg pain.   Integumentary:  Negative for rash.   Neurological:  Negative for dizziness, syncope, weakness and light-headedness.        Medical / Social / Family History     Past Medical History:   Diagnosis Date    Asthma     CHF (congestive heart failure)     COVID-19     Disorder of kidney and ureter        Past Surgical History:   Procedure Laterality Date    APPENDECTOMY       SECTION      CHOLECYSTECTOMY      LAPAROSCOPIC GASTRIC BANDING      PLACEMENT OF DIALYSIS ACCESS Left        Social History  Ms. Frank  reports that she has never smoked. She has never been exposed to tobacco smoke. She has never used smokeless tobacco. She reports that she does not currently use alcohol. She reports that she does not use drugs.    Family History  Ms. Frank's family history includes Breast cancer in her cousin and paternal aunt.    Medications and Allergies     Medications  Outpatient Medications Marked as Taking for the 24 encounter (Office Visit) with Corie Juárez ACNP   Medication Sig Dispense Refill    albuterol (PROVENTIL/VENTOLIN HFA) 90 mcg/actuation inhaler Inhale 2 puffs into the lungs every 4 to 6 hours as needed.      albuterol-ipratropium (DUO-NEB) 2.5 mg-0.5 mg/3 mL nebulizer solution       carvediloL (COREG) 6.25 MG tablet Take 6.25 mg by mouth 2 (two) times daily with meals.      cinacalcet (SENSIPAR) 30 MG Tab Take 30 mg  by mouth every evening.      gabapentin (NEURONTIN) 100 MG capsule Take 1 capsule (100 mg total) by mouth 2 (two) times daily. 180 capsule 1    midodrine (PROAMATINE) 5 MG Tab Take 5 mg by mouth 2 (two) times daily as needed. Only take on dialysis days      sevelamer carbonate (RENVELA) 800 mg Tab Take 800 mg by mouth 4 (four) times daily with meals and nightly. 4 TABLETS WITH EACH MEAL. 2 WITH EACH SNACK.      WIXELA INHUB 250-50 mcg/dose diskus inhaler Inhale 1 puff into the lungs 2 (two) times a day.       Current Facility-Administered Medications for the 9/4/24 encounter (Office Visit) with Corie Juárez ACNP   Medication Dose Route Frequency Provider Last Rate Last Admin    [COMPLETED] influenza (Flulaval, Fluzone, Fluarix) 45 mcg/0.5 mL vaccine 0.5 mL  0.5 mL Intramuscular 1 time in Clinic/HOD Corie Juárez ACNP   0.5 mL at 09/04/24 1049       Allergies  Review of patient's allergies indicates:   Allergen Reactions    Ancef [cefazolin]     Hydralazine     Hydralazine analogues     Latex, natural rubber        Physical Examination     Vitals:    09/04/24 1052   BP: (!) 152/87   Pulse: 75   Resp: 18   Temp: 97.9 °F (36.6 °C)     Physical Exam  Eyes:      Pupils: Pupils are equal, round, and reactive to light.   Cardiovascular:      Rate and Rhythm: Normal rate and regular rhythm.      Heart sounds: Normal heart sounds. No murmur heard.  Pulmonary:      Breath sounds: Normal breath sounds. No wheezing, rhonchi or rales.   Abdominal:      General: Bowel sounds are normal.      Palpations: Abdomen is soft.   Musculoskeletal:         General: No swelling.      Cervical back: Normal range of motion and neck supple.   Skin:     General: Skin is warm and dry.   Neurological:      Mental Status: She is alert and oriented to person, place, and time.          Lab Results   Component Value Date    WBC 4.91 05/13/2024    HGB 9.4 (L) 05/13/2024    HCT 31.0 (L) 05/13/2024    MCV 93.7 05/13/2024      "05/13/2024        Sodium   Date Value Ref Range Status   05/13/2024 141 136 - 145 mmol/L Final     Potassium   Date Value Ref Range Status   05/13/2024 3.3 (L) 3.5 - 5.1 mmol/L Final     Chloride   Date Value Ref Range Status   05/13/2024 97 (L) 98 - 107 mmol/L Final     CO2   Date Value Ref Range Status   05/13/2024 38 (H) 21 - 32 mmol/L Final     Glucose   Date Value Ref Range Status   05/13/2024 96 74 - 106 mg/dL Final     BUN   Date Value Ref Range Status   05/13/2024 26 (H) 7 - 18 mg/dL Final     Creatinine   Date Value Ref Range Status   05/13/2024 9.30 (H) 0.55 - 1.02 mg/dL Final     Calcium   Date Value Ref Range Status   05/13/2024 9.1 8.5 - 10.1 mg/dL Final     Total Protein   Date Value Ref Range Status   05/13/2024 7.9 6.4 - 8.2 g/dL Final     Albumin   Date Value Ref Range Status   05/13/2024 3.5 3.5 - 5.0 g/dL Final     Bilirubin, Total   Date Value Ref Range Status   05/13/2024 0.5 >0.0 - 1.2 mg/dL Final     Alk Phos   Date Value Ref Range Status   05/13/2024 84 41 - 108 U/L Final     AST   Date Value Ref Range Status   05/13/2024 12 (L) 15 - 37 U/L Final     ALT   Date Value Ref Range Status   05/13/2024 12 (L) 13 - 56 U/L Final     Anion Gap   Date Value Ref Range Status   05/13/2024 9 7 - 16 mmol/L Final     eGFR   Date Value Ref Range Status   05/13/2024 5 (L) >=60 mL/min/1.73m2 Final      Lab Results   Component Value Date    HGBA1C 4.7 05/13/2024      Lab Results   Component Value Date    CHOL 119 05/13/2024    CHOL 133 06/27/2023    CHOL 135 12/21/2022     Lab Results   Component Value Date    HDL 44 05/13/2024    HDL 46 06/27/2023    HDL 49 12/21/2022     Lab Results   Component Value Date    LDLCALC 56 05/13/2024    LDLCALC 52 06/27/2023    LDLCALC 66 12/21/2022     No results found for: "DLDL"  Lab Results   Component Value Date    TRIG 97 05/13/2024    TRIG 175 (H) 06/27/2023    TRIG 101 12/21/2022     Lab Results   Component Value Date    CHOLHDL 2.7 05/13/2024    CHOLHDL 2.9 06/27/2023    " CHOLHDL 2.8 12/21/2022      Lab Results   Component Value Date    TSH 6.820 (H) 12/21/2022    FREET4 1.05 12/21/2022        Assessment and Plan (including Health Maintenance)      Problem List  Smart Sets  Document Outside HM   :    Plan:     1. Essential (primary) hypertension  Assessment & Plan:  BP Readings from Last 3 Encounters:   09/04/24 (!) 152/87   06/10/24 138/88   05/13/24 (!) 155/87    The current medical regimen is effective;  continue present plan and medications.      Orders:  -     Comprehensive Metabolic Panel; Future; Expected date: 09/04/2024  -     Microalbumin/Creatinine Ratio, Urine; Future; Expected date: 09/04/2024    2. Hyperlipidemia, unspecified hyperlipidemia type  Assessment & Plan:  Lab Results   Component Value Date    CHOL 119 05/13/2024    CHOL 133 06/27/2023    CHOL 135 12/21/2022     Lab Results   Component Value Date    HDL 44 05/13/2024    HDL 46 06/27/2023    HDL 49 12/21/2022     Lab Results   Component Value Date    LDLCALC 56 05/13/2024    LDLCALC 52 06/27/2023    LDLCALC 66 12/21/2022     Lab Results   Component Value Date    TRIG 97 05/13/2024    TRIG 175 (H) 06/27/2023    TRIG 101 12/21/2022       Lab Results   Component Value Date    CHOLHDL 2.7 05/13/2024    CHOLHDL 2.9 06/27/2023    CHOLHDL 2.8 12/21/2022    The current medical regimen is effective;  continue present plan and medications.      Orders:  -     Lipid Panel; Future; Expected date: 09/04/2024    3. Influenza vaccination administered at current visit  -     influenza (Flulaval, Fluzone, Fluarix) 45 mcg/0.5 mL vaccine 0.5 mL    4. ESRD (end stage renal disease) on dialysis  Assessment & Plan:  On dialysis TTSat  The current medical regimen is effective;  continue present plan and medications.        Other orders  -     pantoprazole (PROTONIX) 40 MG tablet; Take 1 tablet (40 mg total) by mouth once daily.  Dispense: 90 tablet; Refill: 1  -     simvastatin (ZOCOR) 20 MG tablet; Take 1 tablet (20 mg total) by  mouth every evening.  Dispense: 90 tablet; Refill: 1  -     fluticasone propionate (FLONASE) 50 mcg/actuation nasal spray; 1 spray (50 mcg total) by Each Nostril route once daily.  Dispense: 16 g; Refill: 3  -     ofloxacin (FLOXIN) 0.3 % otic solution; Place 5 drops into both ears once daily.  Dispense: 5 mL; Refill: 0  -     doxycycline (MONODOX) 100 MG capsule; Take 1 capsule (100 mg total) by mouth 2 (two) times daily. for 7 days  Dispense: 14 capsule; Refill: 0         There are no Patient Instructions on file for this visit.     Health Maintenance Due   Topic Date Due    Diabetes Urine Screening  Never done         Health Maintenance Topics with due status: Not Due       Topic Last Completion Date    Cervical Cancer Screening 08/09/2021    Colorectal Cancer Screening 07/20/2022    TETANUS VACCINE 06/05/2023    Mammogram 03/27/2024    Lipid Panel 05/13/2024    Hemoglobin A1c 05/13/2024       Future Appointments   Date Time Provider Department Center   12/9/2024 10:20 AM Corie Juárez ACNP [facility] AMAYA [place]   4/2/2025 11:00 AM [facility] [id number] OB [facility] [place]   6/17/2025  8:00 AM AWV NURSE, [facility] AMAA[facility] [place]            Signature:  GLENIS Sanchez  [facility] [address] [place] [address]  [phone]    Date of encounter: 9/4/24

## 2024-09-04 NOTE — ASSESSMENT & PLAN NOTE
On dialysis TTSat  The current medical regimen is effective;  continue present plan and medications.

## 2024-09-04 NOTE — ASSESSMENT & PLAN NOTE
BP Readings from Last 3 Encounters:   09/04/24 (!) 152/87   06/10/24 138/88   05/13/24 (!) 155/87    The current medical regimen is effective;  continue present plan and medications.

## 2024-09-04 NOTE — ASSESSMENT & PLAN NOTE
Lab Results   Component Value Date    CHOL 119 05/13/2024    CHOL 133 06/27/2023    CHOL 135 12/21/2022     Lab Results   Component Value Date    HDL 44 05/13/2024    HDL 46 06/27/2023    HDL 49 12/21/2022     Lab Results   Component Value Date    LDLCALC 56 05/13/2024    LDLCALC 52 06/27/2023    LDLCALC 66 12/21/2022     Lab Results   Component Value Date    TRIG 97 05/13/2024    TRIG 175 (H) 06/27/2023    TRIG 101 12/21/2022       Lab Results   Component Value Date    CHOLHDL 2.7 05/13/2024    CHOLHDL 2.9 06/27/2023    CHOLHDL 2.8 12/21/2022    The current medical regimen is effective;  continue present plan and medications.

## 2024-09-09 DIAGNOSIS — Z71.89 COMPLEX CARE COORDINATION: ICD-10-CM

## 2024-09-18 NOTE — TELEPHONE ENCOUNTER
----- Message from Daisy Lombardi sent at 9/18/2024 11:32 AM CDT -----  Regarding: refill  Who Called: Yaa Frank    Refill or New Rx:Refill  RX Name and Strength:gabapentin (NEURONTIN) 100 MG capsule  How is the patient currently taking it? (ex. 1XDay):2 time day  Is this a 30 day or 90 day RX:90 day  Local or Mail Order:  List of preferred pharmacies on file (remove unneeded): [unfilled][facility] [place], [place]  If different Pharmacy is requested, enter Pharmacy information here including location and phone number:  [phone]   Ordering Provider:POOJA Juárez      Preferred Method of Contact: Phone Call  Patient's Preferred Phone Number on File: [phone]   Best Call Back Number, if different:  Additional Information: Patient is out of Rx

## 2024-09-19 RX ORDER — GABAPENTIN 100 MG/1
100 CAPSULE ORAL 2 TIMES DAILY
Qty: 180 CAPSULE | Refills: 1 | Status: SHIPPED | OUTPATIENT
Start: 2024-09-19 | End: 2025-03-18

## 2024-10-10 ENCOUNTER — TELEPHONE (OUTPATIENT)
Dept: FAMILY MEDICINE | Facility: CLINIC | Age: 54
End: 2024-10-10
Payer: MEDICARE

## 2024-10-10 NOTE — TELEPHONE ENCOUNTER
----- Message from Susan sent at 10/10/2024 11:11 AM CDT -----  Regarding: Call Back - Referral Concern  Who Called: Yaa Frank    Caller is requesting assistance/information from provider's office.      Preferred Method of Contact: Phone Call  Patient's Preferred Phone Number on File: [phone]   Best Call Back Number, if different:  Additional Information: Pt stated she's having problems with her stomach and her kidney Dr is requesting for for her to be referred to a GI Dr. She goes to dialysis on Tuesdays and Thursdays.

## 2024-10-23 ENCOUNTER — TELEPHONE (OUTPATIENT)
Dept: FAMILY MEDICINE | Facility: CLINIC | Age: 54
End: 2024-10-23
Payer: MEDICARE

## 2024-10-23 NOTE — TELEPHONE ENCOUNTER
----- Message from Chelle sent at 10/22/2024  3:24 PM CDT -----  Who Called: Yaa Frank    Caller is requesting assistance/information from provider's office.    Symptoms (please be specific): Stomach problems    How long has patient had these symptoms:  ongoing     Preferred Method of Contact: Phone Call  Patient's Preferred Phone Number on File: [phone]   Best Call Back Number, if different:  Additional Information: Pt is wanting to follow up w prev message for a referral to a GI. States she hasn't heard back on referral request. Pt is also on dialysis

## 2024-10-25 DIAGNOSIS — R10.84 GENERALIZED ABDOMINAL PAIN: Primary | ICD-10-CM

## 2024-11-04 ENCOUNTER — TELEPHONE (OUTPATIENT)
Dept: FAMILY MEDICINE | Facility: CLINIC | Age: 54
End: 2024-11-04
Payer: MEDICARE

## 2024-11-04 NOTE — TELEPHONE ENCOUNTER
----- Message from Janina sent at 11/4/2024  2:26 PM CST -----  Regarding: referral  Who Called: Yaa Frank    Does the patient already have the specialty appointment scheduled?:no  If yes, what is the date of that appointment?:n/a  Referral to What Specialty:GI   Reason for Referral:stomach pains    Does the patient want the referral with a specific physician?:yes  If yes, which provider?: Dr. Pruitt fax [phone]  [facility] the [facility]    Preferred Method of Contact: Phone Call  Patient's Preferred Phone Number on File: [phone]   Best Call Back Number, if different:  Additional Information:

## 2024-11-08 ENCOUNTER — PATIENT MESSAGE (OUTPATIENT)
Dept: FAMILY MEDICINE | Facility: CLINIC | Age: 54
End: 2024-11-08
Payer: MEDICARE

## 2024-11-26 ENCOUNTER — OFFICE VISIT (OUTPATIENT)
Dept: GASTROENTEROLOGY | Facility: CLINIC | Age: 54
End: 2024-11-26
Payer: MEDICARE

## 2024-11-26 VITALS
DIASTOLIC BLOOD PRESSURE: 95 MMHG | BODY MASS INDEX: 50.61 KG/M2 | SYSTOLIC BLOOD PRESSURE: 180 MMHG | HEART RATE: 71 BPM | HEIGHT: 62 IN | OXYGEN SATURATION: 92 % | WEIGHT: 275 LBS

## 2024-11-26 DIAGNOSIS — R19.8 ALTERNATING CONSTIPATION AND DIARRHEA: ICD-10-CM

## 2024-11-26 DIAGNOSIS — R10.84 GENERALIZED ABDOMINAL PAIN: Primary | ICD-10-CM

## 2024-11-26 DIAGNOSIS — R13.10 DYSPHAGIA, UNSPECIFIED TYPE: ICD-10-CM

## 2024-11-26 DIAGNOSIS — Z87.19 HX OF HIATAL HERNIA: ICD-10-CM

## 2024-11-26 PROCEDURE — 99215 OFFICE O/P EST HI 40 MIN: CPT | Mod: PBBFAC

## 2024-11-26 PROCEDURE — 99999 PR PBB SHADOW E&M-EST. PATIENT-LVL V: CPT | Mod: PBBFAC,,,

## 2024-11-26 NOTE — PATIENT INSTRUCTIONS
- I recommend that you also use Miralax 17 grams daily-to-twice daily as needed to have a regular, soft BM without straining you can adjust how often you need miralax, but start with daily dosing and go from there

## 2024-11-26 NOTE — PROGRESS NOTES
Gastroenterology Clinic Note    Patient ID: [id number]   Referring MD: Corie Juárez ACNP   Chief Complaint:   Chief Complaint   Patient presents with    Establish Care     Constipation and diarrhea       History of Present Illness   Yaa Frank is an 54 y.o. AAF who is referred for abdominal pain..  Patient complains of generalized abdominal pain and discomfort.  She has alternating constipation and diarrhea.  No hematochezia or melena reported.  She also complains of increased symptoms of acid reflux and intermittent dysphagia.  She has a history of hiatal hernia.  Previous GI workup was an outside facility 4-5 years ago.  She has taken MiraLax at home and noticed improvement in her constipation but did not take the medication consistently.  She is a dialysis patient (/Thu/Sat).     Last colonoscopy was 2022 with 10 year recall for screening purposes.    Review of Systems   Constitutional:  Negative for weight loss.   Gastrointestinal:  Positive for abdominal pain, constipation, diarrhea, heartburn and nausea. Negative for blood in stool, melena and vomiting.       Past Medical History      Past Medical History:   Diagnosis Date    Asthma     CHF (congestive heart failure)     COVID-19     Disorder of kidney and ureter        Past Surgical History     Past Surgical History:   Procedure Laterality Date    APPENDECTOMY       SECTION      CHOLECYSTECTOMY      LAPAROSCOPIC GASTRIC BANDING      PLACEMENT OF DIALYSIS ACCESS Left        Allergies     Review of patient's allergies indicates:   Allergen Reactions    Ancef [cefazolin]     Hydralazine     Hydralazine analogues     Latex, natural rubber        Immunization History     Immunization History   Administered Date(s) Administered    COVID-19 Vaccine 2022    COVID-19, MRNA, LN-S, PF (MODERNA FULL 0.5 ML DOSE) 02/15/2022    COVID-19, vector-nr, rS-Ad26, PF (Andree) 2021    Influenza - Trivalent - Fluarix, Flulaval, Fluzone, Afluria  - PF 09/04/2024    Pneumococcal Conjugate - 20 Valent 06/05/2023    Tdap 12/27/2012, 06/05/2023    Zoster Recombinant 06/05/2023       Past Family History      Family History   Problem Relation Name Age of Onset    Breast cancer Paternal Aunt      Breast cancer Cousin         Past Social History      Social History     Socioeconomic History    Marital status: Single   Tobacco Use    Smoking status: Never     Passive exposure: Never    Smokeless tobacco: Never   Substance and Sexual Activity    Alcohol use: Not Currently     Comment: Occasional 1 or less glass of wine    Drug use: Never    Sexual activity: Not Currently     Social Drivers of Health     Financial Resource Strain: Medium Risk (6/10/2024)    Overall Financial Resource Strain (CARDIA)     Difficulty of Paying Living Expenses: Somewhat hard   Food Insecurity: Food Insecurity Present (6/10/2024)    Hunger Vital Sign     Worried About Running Out of Food in the Last Year: Sometimes true     Ran Out of Food in the Last Year: Sometimes true   Transportation Needs: No Transportation Needs (6/10/2024)    PRAPARE - Transportation     Lack of Transportation (Medical): No     Lack of Transportation (Non-Medical): No   Physical Activity: Sufficiently Active (6/10/2024)    Exercise Vital Sign     Days of Exercise per Week: 3 days     Minutes of Exercise per Session: 120 min   Stress: No Stress Concern Present (6/10/2024)    [employer] - Occupational Stress Questionnaire     Feeling of Stress : Only a little   Housing Stability: High Risk (6/10/2024)    Housing Stability Vital Sign     Unable to Pay for Housing in the Last Year: Yes     Homeless in the Last Year: No       Current Medications     Outpatient Medications Marked as Taking for the 11/26/24 encounter (Office Visit) with Ana Fuller FNP   Medication Sig Dispense Refill    albuterol (PROVENTIL/VENTOLIN HFA) 90 mcg/actuation inhaler Inhale 2 puffs into the lungs every 4 to 6  "hours as needed.      albuterol-ipratropium (DUO-NEB) 2.5 mg-0.5 mg/3 mL nebulizer solution       carvediloL (COREG) 6.25 MG tablet Take 6.25 mg by mouth 2 (two) times daily with meals.      cinacalcet (SENSIPAR) 30 MG Tab Take 30 mg by mouth every evening.      fluticasone propionate (FLONASE) 50 mcg/actuation nasal spray 1 spray (50 mcg total) by Each Nostril route once daily. 16 g 3    gabapentin (NEURONTIN) 100 MG capsule Take 1 capsule (100 mg total) by mouth 2 (two) times daily. 180 capsule 1    midodrine (PROAMATINE) 5 MG Tab Take 5 mg by mouth 2 (two) times daily as needed. Only take on dialysis days      ofloxacin (FLOXIN) 0.3 % otic solution Place 5 drops into both ears once daily. 5 mL 0    pantoprazole (PROTONIX) 40 MG tablet Take 1 tablet (40 mg total) by mouth once daily. 90 tablet 1    sevelamer carbonate (RENVELA) 800 mg Tab Take 800 mg by mouth 4 (four) times daily with meals and nightly. 4 TABLETS WITH EACH MEAL. 2 WITH EACH SNACK.      simvastatin (ZOCOR) 20 MG tablet Take 1 tablet (20 mg total) by mouth every evening. 90 tablet 1    WIXELA INHUB 250-50 mcg/dose diskus inhaler Inhale 1 puff into the lungs 2 (two) times a day.          I have reviewed the current medications, allergies, vital signs, past medical and surgical history, family medical history, and social history for this encounter and agree with all findings.    OBJECTIVE    Physical Exam    BP (!) 180/95   Pulse 71   Ht 5' 2" (1.575 m)   Wt 124.7 kg (275 lb)   SpO2 (!) 92%   BMI 50.30 kg/m²   GEN: Well appearing, cooperative, NAD.  Obese.  NECK: Supple, no LAD  CV: Normal rate  RESP: Unlabored  ABD: ND, NT, soft, no guarding  EXT: No clubbing, cyanosis, or edema  SKIN: Warm and dry  NEURO: AAO x4.     LABS    CBC (with or without Differential):   Lab Results   Component Value Date    WBC 4.91 05/13/2024    HGB 9.4 (L) 05/13/2024    HCT 31.0 (L) 05/13/2024    MCV 93.7 05/13/2024    MCH 28.4 05/13/2024    MCHC 30.3 (L) 05/13/2024 "    RDW 12.6 05/13/2024     05/13/2024    MPV 10.8 05/13/2024    NEUTOPHILPCT 67.0 (H) 05/13/2024    DIFFTYPE Auto 05/13/2024     BMP/CMP:   Lab Results   Component Value Date     09/04/2024    K 3.8 09/04/2024    CL 97 (L) 09/04/2024    CO2 38 (H) 09/04/2024    BUN 17 09/04/2024    CREATININE 7.64 (H) 09/04/2024    GLU 88 09/04/2024    CALCIUM 9.0 09/04/2024    ALBUMIN 3.6 09/04/2024    AST 12 (L) 09/04/2024    ALT 15 09/04/2024    ALKPHOS 71 09/04/2024        IMAGING  No pertinent imaging available.    ASSESSMENT  Yaa Frank is a 54 y.o. AAF with history of neuropathy, asthma, hyperlipidemia, PVD, hypertension, CHF, ESRD on dialysis, and obesity who is referred for abdominal pain.    1. Generalized abdominal pain    2. Dysphagia, unspecified type    3. Hx of hiatal hernia    4. Alternating constipation and diarrhea           PLAN    - schedule EGD for dysphagia and history of hiatal hernia; discussed procedure with patient, including risks and benefits, patient verbalized understanding  - treatment of constipation below; consider Amitiza or Linzess if symptoms are not improved at follow-up    Patient Instructions   - I recommend that you also use Miralax 17 grams daily-to-twice daily as needed to have a regular, soft BM without straining you can adjust how often you need miralax, but start with daily dosing and go from there          No orders of the defined types were placed in this encounter.        The risks and benefits of my recommendations, as well as other treatment options were discussed with the patient today. All questions were answered.    45 minutes of total time spent on the encounter, which includes face to face time and non-face to face time preparing to see the patient (eg, review of tests), obtaining and/or reviewing separately obtained history, documenting clinical information in the electronic or other health record, Independently interpreting results (not separately reported)  and communicating results to the patient/family/caregiver, or care coordination (not separately reported).        Ana Fuller, LAILAP/ACNP  [facility]

## 2024-12-09 ENCOUNTER — OFFICE VISIT (OUTPATIENT)
Dept: FAMILY MEDICINE | Facility: CLINIC | Age: 54
End: 2024-12-09
Payer: MEDICARE

## 2024-12-09 VITALS
OXYGEN SATURATION: 84 % | HEART RATE: 91 BPM | DIASTOLIC BLOOD PRESSURE: 97 MMHG | TEMPERATURE: 98 F | BODY MASS INDEX: 49.99 KG/M2 | SYSTOLIC BLOOD PRESSURE: 157 MMHG | RESPIRATION RATE: 18 BRPM | HEIGHT: 62 IN | WEIGHT: 271.63 LBS

## 2024-12-09 DIAGNOSIS — E78.5 HYPERLIPIDEMIA, UNSPECIFIED HYPERLIPIDEMIA TYPE: ICD-10-CM

## 2024-12-09 DIAGNOSIS — I10 ESSENTIAL (PRIMARY) HYPERTENSION: Primary | ICD-10-CM

## 2024-12-09 DIAGNOSIS — Z99.2 ESRD (END STAGE RENAL DISEASE) ON DIALYSIS: ICD-10-CM

## 2024-12-09 DIAGNOSIS — I50.9 CHRONIC HEART FAILURE, UNSPECIFIED HEART FAILURE TYPE: ICD-10-CM

## 2024-12-09 DIAGNOSIS — N18.6 ESRD (END STAGE RENAL DISEASE) ON DIALYSIS: ICD-10-CM

## 2024-12-09 LAB
ALBUMIN SERPL BCP-MCNC: 3.6 G/DL (ref 3.5–5)
ALBUMIN/GLOB SERPL: 1.1 {RATIO}
ALP SERPL-CCNC: 68 U/L (ref 40–150)
ALT SERPL W P-5'-P-CCNC: 7 U/L
ANION GAP SERPL CALCULATED.3IONS-SCNC: 13 MMOL/L (ref 7–16)
AST SERPL W P-5'-P-CCNC: 19 U/L (ref 5–34)
BASOPHILS # BLD AUTO: 0.03 K/UL (ref 0–0.2)
BASOPHILS NFR BLD AUTO: 1.1 % (ref 0–1)
BILIRUB SERPL-MCNC: 1 MG/DL
BUN SERPL-MCNC: 20 MG/DL (ref 10–20)
BUN/CREAT SERPL: 2 (ref 6–20)
CALCIUM SERPL-MCNC: 8.7 MG/DL (ref 8.4–10.2)
CHLORIDE SERPL-SCNC: 91 MMOL/L (ref 98–107)
CHOLEST SERPL-MCNC: 130 MG/DL
CHOLEST/HDLC SERPL: 3.2 {RATIO}
CO2 SERPL-SCNC: 36 MMOL/L (ref 22–29)
CREAT SERPL-MCNC: 8.2 MG/DL (ref 0.55–1.02)
DIFFERENTIAL METHOD BLD: ABNORMAL
EGFR (NO RACE VARIABLE) (RUSH/TITUS): 5 ML/MIN/1.73M2
EOSINOPHIL # BLD AUTO: 0.06 K/UL (ref 0–0.5)
EOSINOPHIL NFR BLD AUTO: 2.1 % (ref 1–4)
ERYTHROCYTE [DISTWIDTH] IN BLOOD BY AUTOMATED COUNT: 15.2 % (ref 11.5–14.5)
GLOBULIN SER-MCNC: 3.3 G/DL (ref 2–4)
GLUCOSE SERPL-MCNC: 96 MG/DL (ref 74–100)
HCT VFR BLD AUTO: 33.5 % (ref 38–47)
HDLC SERPL-MCNC: 41 MG/DL (ref 35–60)
HGB BLD-MCNC: 9.8 G/DL (ref 12–16)
IMM GRANULOCYTES # BLD AUTO: 0.01 K/UL (ref 0–0.04)
IMM GRANULOCYTES NFR BLD: 0.4 % (ref 0–0.4)
LDLC SERPL CALC-MCNC: 74 MG/DL
LDLC/HDLC SERPL: 1.8 {RATIO}
LYMPHOCYTES # BLD AUTO: 0.65 K/UL (ref 1–4.8)
LYMPHOCYTES NFR BLD AUTO: 22.9 % (ref 27–41)
MCH RBC QN AUTO: 26.1 PG (ref 27–31)
MCHC RBC AUTO-ENTMCNC: 29.3 G/DL (ref 32–36)
MCV RBC AUTO: 89.1 FL (ref 80–96)
MONOCYTES # BLD AUTO: 0.21 K/UL (ref 0–0.8)
MONOCYTES NFR BLD AUTO: 7.4 % (ref 2–6)
MPC BLD CALC-MCNC: 11.8 FL (ref 9.4–12.4)
NEUTROPHILS # BLD AUTO: 1.88 K/UL (ref 1.8–7.7)
NEUTROPHILS NFR BLD AUTO: 66.1 % (ref 53–65)
NONHDLC SERPL-MCNC: 89 MG/DL
NRBC # BLD AUTO: 0 X10E3/UL
NRBC, AUTO (.00): 0 %
PLATELET # BLD AUTO: 135 K/UL (ref 150–400)
POTASSIUM SERPL-SCNC: 3.7 MMOL/L (ref 3.5–5.1)
PROT SERPL-MCNC: 6.9 G/DL (ref 6.4–8.3)
RBC # BLD AUTO: 3.76 M/UL (ref 4.2–5.4)
SODIUM SERPL-SCNC: 136 MMOL/L (ref 136–145)
TRIGL SERPL-MCNC: 77 MG/DL (ref 37–140)
VLDLC SERPL-MCNC: 15 MG/DL
WBC # BLD AUTO: 2.84 K/UL (ref 4.5–11)

## 2024-12-09 PROCEDURE — 80061 LIPID PANEL: CPT | Mod: ,,, | Performed by: CLINICAL MEDICAL LABORATORY

## 2024-12-09 PROCEDURE — 3080F DIAST BP >= 90 MM HG: CPT | Mod: ,,, | Performed by: NURSE PRACTITIONER

## 2024-12-09 PROCEDURE — 80053 COMPREHEN METABOLIC PANEL: CPT | Mod: ,,, | Performed by: CLINICAL MEDICAL LABORATORY

## 2024-12-09 PROCEDURE — 3044F HG A1C LEVEL LT 7.0%: CPT | Mod: ,,, | Performed by: NURSE PRACTITIONER

## 2024-12-09 PROCEDURE — 85025 COMPLETE CBC W/AUTO DIFF WBC: CPT | Mod: ,,, | Performed by: CLINICAL MEDICAL LABORATORY

## 2024-12-09 PROCEDURE — 99214 OFFICE O/P EST MOD 30 MIN: CPT | Mod: ,,, | Performed by: NURSE PRACTITIONER

## 2024-12-09 PROCEDURE — 1160F RVW MEDS BY RX/DR IN RCRD: CPT | Mod: ,,, | Performed by: NURSE PRACTITIONER

## 2024-12-09 PROCEDURE — 3008F BODY MASS INDEX DOCD: CPT | Mod: ,,, | Performed by: NURSE PRACTITIONER

## 2024-12-09 PROCEDURE — 1159F MED LIST DOCD IN RCRD: CPT | Mod: ,,, | Performed by: NURSE PRACTITIONER

## 2024-12-09 PROCEDURE — 3077F SYST BP >= 140 MM HG: CPT | Mod: ,,, | Performed by: NURSE PRACTITIONER

## 2024-12-09 NOTE — PROGRESS NOTES
GLENIS Sanchez   [facility] [address] [place] [address]  [phone]      PATIENT NAME: Yaa Frank  : 1970  DATE: 24  MRN: [id number]      Billing Provider: GLENIS Sanchez  Level of Service:   Patient PCP Information       Provider PCP Type    GLENIS Sanchez General            Reason for Visit / Chief Complaint: Hypertension, Hyperlipidemia, and Follow-up (3 mos)       Update PCP  Update Chief Complaint         History of Present Illness / Problem Focused Workflow     Yaa Frank presents to the clinic with Hypertension, Hyperlipidemia, and Follow-up (3 mos)     Pt presents for routine follow up with lab and med refills. Overall doing well.      BP appears not at goal today. Home meds reviewed. Will not make adjustments at this time due to hx of hypotension. She has an upcoming appt with cardiology will defer bp mgt to them.  Advised to monitor BP at home. Advised on optimal BP readings - SBP < 130 & DBP < 80. Advised to call office for any persistent BP elevation and may have to prescribe or adjust BP med(s).  Recommended DASH diet, stay well hydrated with water daily, eliminate or decrease caffeinated and high calorie drinks, increase physical activity, and lose weight if BMI > 25.0. Written patient education information provided to patient with goals and recommendations to assist with BP management.     Discussed need for low fat/low cholesterol diet, regular exercise, and weight control.   Cardiovascular risk and specific lipid/LDL goals reviewed.        Review of Systems     Review of Systems   Constitutional:  Negative for fatigue and fever.   HENT:  Negative for nasal congestion and sore throat.    Eyes:  Negative for visual disturbance.   Respiratory:  Negative for chest tightness and shortness of breath.    Cardiovascular:  Negative for chest pain and leg swelling.    Gastrointestinal:  Negative for abdominal pain and change in bowel habit.   Endocrine: Negative for polydipsia, polyphagia and polyuria.   Genitourinary:  Negative for dysuria and hematuria.   Musculoskeletal:  Negative for back pain and leg pain.   Integumentary:  Negative for rash.   Neurological:  Negative for dizziness, syncope, weakness and light-headedness.        Medical / Social / Family History     Past Medical History:   Diagnosis Date    Asthma     CHF (congestive heart failure)     COVID-19     Disorder of kidney and ureter        Past Surgical History:   Procedure Laterality Date    APPENDECTOMY       SECTION      CHOLECYSTECTOMY      LAPAROSCOPIC GASTRIC BANDING      PLACEMENT OF DIALYSIS ACCESS Left        Social History  Ms. Frank  reports that she has never smoked. She has never been exposed to tobacco smoke. She has never used smokeless tobacco. She reports that she does not currently use alcohol. She reports that she does not use drugs.    Family History  Ms. Frank's family history includes Breast cancer in her cousin and paternal aunt.    Medications and Allergies     Medications  Outpatient Medications Marked as Taking for the 24 encounter (Office Visit) with Corie Juárez ACNP   Medication Sig Dispense Refill    albuterol (PROVENTIL/VENTOLIN HFA) 90 mcg/actuation inhaler Inhale 2 puffs into the lungs every 4 to 6 hours as needed.      albuterol-ipratropium (DUO-NEB) 2.5 mg-0.5 mg/3 mL nebulizer solution       carvediloL (COREG) 6.25 MG tablet Take 6.25 mg by mouth 2 (two) times daily with meals.      cinacalcet (SENSIPAR) 30 MG Tab Take 30 mg by mouth every evening.      fluticasone propionate (FLONASE) 50 mcg/actuation nasal spray 1 spray (50 mcg total) by Each Nostril route once daily. 16 g 3    gabapentin (NEURONTIN) 100 MG capsule Take 1 capsule (100 mg total) by mouth 2 (two) times daily. 180 capsule 1    midodrine (PROAMATINE) 5 MG Tab Take 5 mg by mouth 2 (two)  times daily as needed. Only take on dialysis days      ofloxacin (FLOXIN) 0.3 % otic solution Place 5 drops into both ears once daily. 5 mL 0    pantoprazole (PROTONIX) 40 MG tablet Take 1 tablet (40 mg total) by mouth once daily. 90 tablet 1    sevelamer carbonate (RENVELA) 800 mg Tab Take 800 mg by mouth 4 (four) times daily with meals and nightly. 4 TABLETS WITH EACH MEAL. 2 WITH EACH SNACK.      simvastatin (ZOCOR) 20 MG tablet Take 1 tablet (20 mg total) by mouth every evening. 90 tablet 1    WIXELA INHUB 250-50 mcg/dose diskus inhaler Inhale 1 puff into the lungs 2 (two) times a day.         Allergies  Review of patient's allergies indicates:   Allergen Reactions    Ancef [cefazolin]     Hydralazine     Hydralazine analogues     Latex, natural rubber        Physical Examination     Vitals:    12/09/24 1031   BP: (!) 157/97   Pulse: 91   Resp: 18   Temp: 97.9 °F (36.6 °C)     Physical Exam  Eyes:      Pupils: Pupils are equal, round, and reactive to light.   Cardiovascular:      Rate and Rhythm: Normal rate and regular rhythm.      Heart sounds: Normal heart sounds. No murmur heard.  Pulmonary:      Breath sounds: Normal breath sounds. No wheezing, rhonchi or rales.   Abdominal:      General: Bowel sounds are normal.      Palpations: Abdomen is soft.   Musculoskeletal:         General: No swelling.      Cervical back: Normal range of motion and neck supple.   Neurological:      Mental Status: She is alert and oriented to person, place, and time.          Lab Results   Component Value Date    WBC 4.91 05/13/2024    HGB 9.4 (L) 05/13/2024    HCT 31.0 (L) 05/13/2024    MCV 93.7 05/13/2024     05/13/2024        Sodium   Date Value Ref Range Status   09/04/2024 139 136 - 145 mmol/L Final     Potassium   Date Value Ref Range Status   09/04/2024 3.8 3.5 - 5.1 mmol/L Final     Chloride   Date Value Ref Range Status   09/04/2024 97 (L) 98 - 107 mmol/L Final     CO2   Date Value Ref Range Status   09/04/2024 38  "(H) 21 - 32 mmol/L Final     Glucose   Date Value Ref Range Status   09/04/2024 88 74 - 106 mg/dL Final     BUN   Date Value Ref Range Status   09/04/2024 17 7 - 18 mg/dL Final     Creatinine   Date Value Ref Range Status   09/04/2024 7.64 (H) 0.55 - 1.02 mg/dL Final     Calcium   Date Value Ref Range Status   09/04/2024 9.0 8.5 - 10.1 mg/dL Final     Total Protein   Date Value Ref Range Status   09/04/2024 7.6 6.4 - 8.2 g/dL Final     Albumin   Date Value Ref Range Status   09/04/2024 3.6 3.5 - 5.0 g/dL Final     Bilirubin, Total   Date Value Ref Range Status   09/04/2024 0.6 >0.0 - 1.2 mg/dL Final     Alk Phos   Date Value Ref Range Status   09/04/2024 71 41 - 108 U/L Final     AST   Date Value Ref Range Status   09/04/2024 12 (L) 15 - 37 U/L Final     ALT   Date Value Ref Range Status   09/04/2024 15 13 - 56 U/L Final     Anion Gap   Date Value Ref Range Status   09/04/2024 8 7 - 16 mmol/L Final     eGFR   Date Value Ref Range Status   09/04/2024 6 (L) >=60 mL/min/1.73m2 Final      Lab Results   Component Value Date    HGBA1C 4.7 05/13/2024      Lab Results   Component Value Date    CHOL 109 09/04/2024    CHOL 119 05/13/2024    CHOL 133 06/27/2023     Lab Results   Component Value Date    HDL 48 09/04/2024    HDL 44 05/13/2024    HDL 46 06/27/2023     Lab Results   Component Value Date    LDLCALC 45 09/04/2024    LDLCALC 56 05/13/2024    LDLCALC 52 06/27/2023     No results found for: "DLDL"  Lab Results   Component Value Date    TRIG 81 09/04/2024    TRIG 97 05/13/2024    TRIG 175 (H) 06/27/2023     Lab Results   Component Value Date    CHOLHDL 2.3 09/04/2024    CHOLHDL 2.7 05/13/2024    CHOLHDL 2.9 06/27/2023      Lab Results   Component Value Date    TSH 6.820 (H) 12/21/2022    FREET4 1.05 12/21/2022        Assessment and Plan (including Health Maintenance)      Problem List  Smart Sets  Document Outside HM   :    Plan:     1. Essential (primary) hypertension  Assessment & Plan:  BP Readings from Last 3 " Encounters:   12/09/24 (!) 157/97   11/26/24 (!) 180/95   09/04/24 (!) 152/87    Goal less 130/80  Follow up with Dr Iraheta cardiology     Orders:  -     CBC Auto Differential; Future; Expected date: 12/09/2024  -     Comprehensive Metabolic Panel; Future; Expected date: 12/09/2024    2. Hyperlipidemia, unspecified hyperlipidemia type  Assessment & Plan:  Lab Results   Component Value Date    CHOL 109 09/04/2024    CHOL 119 05/13/2024    CHOL 133 06/27/2023     Lab Results   Component Value Date    HDL 48 09/04/2024    HDL 44 05/13/2024    HDL 46 06/27/2023     Lab Results   Component Value Date    LDLCALC 45 09/04/2024    LDLCALC 56 05/13/2024    LDLCALC 52 06/27/2023     Lab Results   Component Value Date    TRIG 81 09/04/2024    TRIG 97 05/13/2024    TRIG 175 (H) 06/27/2023       Lab Results   Component Value Date    CHOLHDL 2.3 09/04/2024    CHOLHDL 2.7 05/13/2024    CHOLHDL 2.9 06/27/2023    The current medical regimen is effective;  continue present plan and medications.      Orders:  -     Lipid Panel; Future; Expected date: 12/09/2024    3. Chronic heart failure, unspecified heart failure type  Overview:  Followed by cardiology Dr Sarahi Winters     Assessment & Plan:  Has up coming appt with Dr Iraheta cardiology  Pt to request to have records sent to us       4. ESRD (end stage renal disease) on dialysis  Assessment & Plan:  On Dialysis TTSat  The current medical regimen is effective;  continue present plan and medications.             There are no Patient Instructions on file for this visit.     There are no preventive care reminders to display for this patient.      Health Maintenance Topics with due status: Not Due       Topic Last Completion Date    Cervical Cancer Screening 08/09/2021    Colorectal Cancer Screening 07/20/2022    TETANUS VACCINE 06/05/2023    Mammogram 03/27/2024    Hemoglobin A1c 05/13/2024    Lipid Panel 09/04/2024    RSV Vaccine (Age 60+ and Pregnant patients) Not Due        Future Appointments   Date Time Provider Department Center   1/23/2025 12:30 PM [facility] GI ROOM 01 [facility]   3/12/2025 10:40 AM Ana Fuller FNP [facility]   3/17/2025  9:20 AM Corie Juárez ACNP [facility] [place]   4/2/2025 11:00 AM [facility] [place]   6/17/2025  8:00 AM AWV NURSE, [facility] [place]            Signature:  GLENIS Sanchez  [facility] [address] [place] [address]  [phone]    Date of encounter: 12/9/24

## 2024-12-09 NOTE — ASSESSMENT & PLAN NOTE
BP Readings from Last 3 Encounters:   12/09/24 (!) 157/97   11/26/24 (!) 180/95   09/04/24 (!) 152/87    Goal less 130/80  Follow up with Dr Iraheta cardiology   
Has up coming appt with Dr Iraheta cardiology  Pt to request to have records sent to us   
Lab Results   Component Value Date    CHOL 109 09/04/2024    CHOL 119 05/13/2024    CHOL 133 06/27/2023     Lab Results   Component Value Date    HDL 48 09/04/2024    HDL 44 05/13/2024    HDL 46 06/27/2023     Lab Results   Component Value Date    LDLCALC 45 09/04/2024    LDLCALC 56 05/13/2024    LDLCALC 52 06/27/2023     Lab Results   Component Value Date    TRIG 81 09/04/2024    TRIG 97 05/13/2024    TRIG 175 (H) 06/27/2023       Lab Results   Component Value Date    CHOLHDL 2.3 09/04/2024    CHOLHDL 2.7 05/13/2024    CHOLHDL 2.9 06/27/2023    The current medical regimen is effective;  continue present plan and medications.    
On Dialysis TTSat  The current medical regimen is effective;  continue present plan and medications.    
appropriate

## 2025-01-13 ENCOUNTER — OFFICE VISIT (OUTPATIENT)
Dept: FAMILY MEDICINE | Facility: CLINIC | Age: 55
End: 2025-01-13
Payer: MEDICARE

## 2025-01-13 VITALS
SYSTOLIC BLOOD PRESSURE: 174 MMHG | HEART RATE: 79 BPM | WEIGHT: 267.88 LBS | HEIGHT: 62 IN | TEMPERATURE: 98 F | BODY MASS INDEX: 49.3 KG/M2 | OXYGEN SATURATION: 84 % | DIASTOLIC BLOOD PRESSURE: 102 MMHG | RESPIRATION RATE: 20 BRPM

## 2025-01-13 DIAGNOSIS — N18.6 ESRD (END STAGE RENAL DISEASE) ON DIALYSIS: ICD-10-CM

## 2025-01-13 DIAGNOSIS — E66.01 MORBID OBESITY: ICD-10-CM

## 2025-01-13 DIAGNOSIS — Z99.2 ESRD (END STAGE RENAL DISEASE) ON DIALYSIS: ICD-10-CM

## 2025-01-13 DIAGNOSIS — J45.50 SEVERE PERSISTENT ASTHMA WITHOUT COMPLICATION: ICD-10-CM

## 2025-01-13 DIAGNOSIS — I50.9 CHRONIC HEART FAILURE, UNSPECIFIED HEART FAILURE TYPE: ICD-10-CM

## 2025-01-13 DIAGNOSIS — Z09 HOSPITAL DISCHARGE FOLLOW-UP: Primary | ICD-10-CM

## 2025-01-13 DIAGNOSIS — L29.9 ITCHING: ICD-10-CM

## 2025-01-13 PROBLEM — Z11.59 SCREENING FOR VIRAL DISEASE: Status: RESOLVED | Noted: 2022-05-13 | Resolved: 2025-01-13

## 2025-01-13 PROBLEM — N19 RENAL FAILURE: Status: RESOLVED | Noted: 2021-06-25 | Resolved: 2025-01-13
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
## 2025-01-13 PROCEDURE — 1160F RVW MEDS BY RX/DR IN RCRD: CPT | Mod: ,,, | Performed by: NURSE PRACTITIONER

## 2025-01-13 PROCEDURE — 99213 OFFICE O/P EST LOW 20 MIN: CPT | Mod: 25,,, | Performed by: NURSE PRACTITIONER

## 2025-01-13 PROCEDURE — 3077F SYST BP >= 140 MM HG: CPT | Mod: ,,, | Performed by: NURSE PRACTITIONER

## 2025-01-13 PROCEDURE — 3080F DIAST BP >= 90 MM HG: CPT | Mod: ,,, | Performed by: NURSE PRACTITIONER

## 2025-01-13 PROCEDURE — 96372 THER/PROPH/DIAG INJ SC/IM: CPT | Mod: ,,, | Performed by: NURSE PRACTITIONER

## 2025-01-13 PROCEDURE — 3008F BODY MASS INDEX DOCD: CPT | Mod: ,,, | Performed by: NURSE PRACTITIONER

## 2025-01-13 PROCEDURE — 1159F MED LIST DOCD IN RCRD: CPT | Mod: ,,, | Performed by: NURSE PRACTITIONER

## 2025-01-13 RX ORDER — HYDROCORTISONE 25 MG/G
CREAM TOPICAL 2 TIMES DAILY
Qty: 20 G | Refills: 1 | Status: SHIPPED | OUTPATIENT
Start: 2025-01-13

## 2025-01-13 RX ORDER — AMOXICILLIN 875 MG/1
875 TABLET, FILM COATED ORAL 2 TIMES DAILY
COMMUNITY
Start: 2025-01-06

## 2025-01-13 RX ORDER — BETAMETHASONE SODIUM PHOSPHATE AND BETAMETHASONE ACETATE 3; 3 MG/ML; MG/ML
6 INJECTION, SUSPENSION INTRA-ARTICULAR; INTRALESIONAL; INTRAMUSCULAR; SOFT TISSUE
Status: COMPLETED | OUTPATIENT
Start: 2025-01-13 | End: 2025-01-13

## 2025-01-13 RX ADMIN — BETAMETHASONE SODIUM PHOSPHATE AND BETAMETHASONE ACETATE 6 MG: 3; 3 INJECTION, SUSPENSION INTRA-ARTICULAR; INTRALESIONAL; INTRAMUSCULAR; SOFT TISSUE at 03:01

## 2025-01-13 NOTE — PROGRESS NOTES
"   GLENIS Sanchez   [facility] [address] [place] [address]  [phone]      PATIENT NAME: Yaa Frank  : 1970  DATE: 25  MRN: [id number]      Billing Provider: GLENIS Sanchez  Level of Service:   Patient PCP Information       Provider PCP Type    GLENIS Sanchez General            Reason for Visit / Chief Complaint: Follow-up (F/u ER visit for asthma)       Update PCP  Update Chief Complaint         History of Present Illness / Problem Focused Workflow     Yaa Frank presents to the clinic with Follow-up (F/u ER visit for asthma)     Transitional Care Note    Family and/or Caretaker present at visit?  No.  Diagnostic tests reviewed/disposition: No diagnosic tests pending after this hospitalization.  Disease/illness education: copd exacerbation/chronic bronchitis/asthma, ESRD on dialysis  Home health/community services discussion/referrals: Patient does not have home health established from hospital visit.  They do need home health.  If needed, we will set up home health for the patient.   Establishment or re-establishment of referral orders for community resources: No other necessary community resources.   Discussion with other health care providers: spoke to GI to answer questions about upcoming EGD.      "Medication list from hospital discharge  will be reconciled with clinic medication list & updated in patient's chart.  Discharge summary will be reviewed by provider. Records from [place] have been requested.    Pt is much improved. Has complaints of itching. Will give steroid shot today. Will obtain CXR once available in clinic        Review of Systems     Review of Systems   Constitutional:  Negative for fatigue and fever.   HENT:  Negative for nasal congestion and sore throat.    Eyes:  Negative for visual disturbance.   Respiratory:  Positive for shortness of breath. " Negative for chest tightness.    Cardiovascular:  Negative for chest pain and leg swelling.   Gastrointestinal:  Negative for abdominal pain and change in bowel habit.   Endocrine: Negative for polydipsia, polyphagia and polyuria.   Genitourinary:  Negative for dysuria and hematuria.   Musculoskeletal:  Negative for back pain and leg pain.   Integumentary:  Negative for rash.   Neurological:  Negative for dizziness, syncope, weakness and light-headedness.        Medical / Social / Family History     Past Medical History:   Diagnosis Date    Asthma     CHF (congestive heart failure)     COVID-19     Disorder of kidney and ureter        Past Surgical History:   Procedure Laterality Date    APPENDECTOMY       SECTION      CHOLECYSTECTOMY      LAPAROSCOPIC GASTRIC BANDING      PLACEMENT OF DIALYSIS ACCESS Left        Social History  Ms. Farnk  reports that she has never smoked. She has never been exposed to tobacco smoke. She has never used smokeless tobacco. She reports that she does not currently use alcohol. She reports that she does not use drugs.    Family History  Ms. Frank's family history includes Breast cancer in her cousin and paternal aunt.    Medications and Allergies     Medications  Outpatient Medications Marked as Taking for the 25 encounter (Office Visit) with Corie Juárez ACNP   Medication Sig Dispense Refill    albuterol (PROVENTIL/VENTOLIN HFA) 90 mcg/actuation inhaler Inhale 2 puffs into the lungs every 4 to 6 hours as needed.      albuterol-ipratropium (DUO-NEB) 2.5 mg-0.5 mg/3 mL nebulizer solution       amoxicillin (AMOXIL) 875 MG tablet Take 875 mg by mouth 2 (two) times daily.      carvediloL (COREG) 6.25 MG tablet Take 6.25 mg by mouth 2 (two) times daily with meals.      cinacalcet (SENSIPAR) 30 MG Tab Take 30 mg by mouth every evening.      fluticasone propionate (FLONASE) 50 mcg/actuation nasal spray 1 spray (50 mcg total) by Each Nostril route once daily. 16 g 3     gabapentin (NEURONTIN) 100 MG capsule Take 1 capsule (100 mg total) by mouth 2 (two) times daily. 180 capsule 1    midodrine (PROAMATINE) 5 MG Tab Take 5 mg by mouth 2 (two) times daily as needed. Only take on dialysis days      ofloxacin (FLOXIN) 0.3 % otic solution Place 5 drops into both ears once daily. 5 mL 0    pantoprazole (PROTONIX) 40 MG tablet Take 1 tablet (40 mg total) by mouth once daily. 90 tablet 1    sevelamer carbonate (RENVELA) 800 mg Tab Take 800 mg by mouth 4 (four) times daily with meals and nightly. 4 TABLETS WITH EACH MEAL. 2 WITH EACH SNACK.      simvastatin (ZOCOR) 20 MG tablet Take 1 tablet (20 mg total) by mouth every evening. 90 tablet 1    WIXELA INHUB 250-50 mcg/dose diskus inhaler Inhale 1 puff into the lungs 2 (two) times a day.       Current Facility-Administered Medications for the 1/13/25 encounter (Office Visit) with Corie Juárez ACNP   Medication Dose Route Frequency Provider Last Rate Last Admin    [COMPLETED] betamethasone acetate-betamethasone sodium phosphate injection 6 mg  6 mg Intramuscular 1 time in Clinic/HOD Corie Juárez ACNP   6 mg at 01/13/25 1540       Allergies  Review of patient's allergies indicates:   Allergen Reactions    Ancef [cefazolin]     Hydralazine     Hydralazine analogues     Latex, natural rubber        Physical Examination     Vitals:    01/13/25 1505   BP: (!) 174/102   Pulse: 79   Resp: 20   Temp: 97.5 °F (36.4 °C)     Physical Exam  Eyes:      Pupils: Pupils are equal, round, and reactive to light.   Cardiovascular:      Rate and Rhythm: Normal rate and regular rhythm.      Heart sounds: Normal heart sounds. No murmur heard.  Pulmonary:      Breath sounds: Normal breath sounds. No wheezing, rhonchi or rales.   Abdominal:      General: Bowel sounds are normal.      Palpations: Abdomen is soft.   Musculoskeletal:         General: No swelling.      Cervical back: Normal range of motion and neck supple.   Skin:     General: Skin is warm  and dry.   Neurological:      Mental Status: She is alert and oriented to person, place, and time.          Lab Results   Component Value Date    WBC 2.84 (L) 12/09/2024    HGB 9.8 (L) 12/09/2024    HCT 33.5 (L) 12/09/2024    MCV 89.1 12/09/2024     (L) 12/09/2024        Sodium   Date Value Ref Range Status   12/09/2024 136 136 - 145 mmol/L Final     Potassium   Date Value Ref Range Status   12/09/2024 3.7 3.5 - 5.1 mmol/L Final     Chloride   Date Value Ref Range Status   12/09/2024 91 (L) 98 - 107 mmol/L Final     CO2   Date Value Ref Range Status   12/09/2024 36 (H) 22 - 29 mmol/L Final     Glucose   Date Value Ref Range Status   12/09/2024 96 74 - 100 mg/dL Final     BUN   Date Value Ref Range Status   12/09/2024 20 10 - 20 mg/dL Final     Creatinine   Date Value Ref Range Status   12/09/2024 8.20 (H) 0.55 - 1.02 mg/dL Final     Calcium   Date Value Ref Range Status   12/09/2024 8.7 8.4 - 10.2 mg/dL Final     Total Protein   Date Value Ref Range Status   12/09/2024 6.9 6.4 - 8.3 g/dL Final     Albumin   Date Value Ref Range Status   12/09/2024 3.6 3.5 - 5.0 g/dL Final     Bilirubin, Total   Date Value Ref Range Status   12/09/2024 1.0 <=1.5 mg/dL Final     Alk Phos   Date Value Ref Range Status   12/09/2024 68 40 - 150 U/L Final     AST   Date Value Ref Range Status   12/09/2024 19 5 - 34 U/L Final     ALT   Date Value Ref Range Status   12/09/2024 7 <=55 U/L Final     Anion Gap   Date Value Ref Range Status   12/09/2024 13 7 - 16 mmol/L Final     eGFR   Date Value Ref Range Status   12/09/2024 5 (L) >=60 mL/min/1.73m2 Final      Lab Results   Component Value Date    HGBA1C 4.7 05/13/2024      Lab Results   Component Value Date    CHOL 130 12/09/2024    CHOL 109 09/04/2024    CHOL 119 05/13/2024     Lab Results   Component Value Date    HDL 41 12/09/2024    HDL 48 09/04/2024    HDL 44 05/13/2024     Lab Results   Component Value Date    LDLCALC 74 12/09/2024    LDLCALC 45 09/04/2024    LDLCALC 56  "05/13/2024     No results found for: "DLDL"  Lab Results   Component Value Date    TRIG 77 12/09/2024    TRIG 81 09/04/2024    TRIG 97 05/13/2024     Lab Results   Component Value Date    CHOLHDL 3.2 12/09/2024    CHOLHDL 2.3 09/04/2024    CHOLHDL 2.7 05/13/2024      Lab Results   Component Value Date    TSH 6.820 (H) 12/21/2022    FREET4 1.05 12/21/2022        Assessment and Plan (including Health Maintenance)      Problem List  Smart Sets  Document Outside HM   :    Plan:     1. Hospital discharge follow-up    2. Itching  -     betamethasone acetate-betamethasone sodium phosphate injection 6 mg    3. Morbid obesity    4. Chronic heart failure, unspecified heart failure type  Overview:  Followed by cardiology Dr Sarahi Winters       5. ESRD (end stage renal disease) on dialysis    6. Severe persistent asthma without complication    Other orders  -     hydrocortisone 2.5 % cream; Apply topically 2 (two) times daily.  Dispense: 20 g; Refill: 1         There are no Patient Instructions on file for this visit.     Health Maintenance Due   Topic Date Due    Mammogram  03/27/2025         Health Maintenance Topics with due status: Not Due       Topic Last Completion Date    Cervical Cancer Screening 08/09/2021    Colorectal Cancer Screening 07/20/2022    TETANUS VACCINE 06/05/2023    Hemoglobin A1c 05/13/2024    Lipid Panel 12/09/2024    RSV Vaccine (Age 60+ and Pregnant patients) Not Due       Future Appointments   Date Time Provider Department Center   1/23/2025 12:30 PM [facility] GI ROOM 01 [facility]   3/12/2025 10:40 AM Ana Fuller FNP [facility]   3/17/2025  9:20 AM Corie Juárez ACNP [facility]   4/2/2025 11:00 AM [facility]   6/17/2025  8:00 AM [facility]            Signature:  GLENIS Sanchez  [facility]  [address] [place] [address] [place] [address]  [phone]    Date of encounter: 1/13/25

## 2025-01-15 DIAGNOSIS — G62.9 NEUROPATHY: Primary | ICD-10-CM

## 2025-01-15 RX ORDER — GABAPENTIN 100 MG/1
100 CAPSULE ORAL 2 TIMES DAILY
Qty: 180 CAPSULE | Refills: 1 | Status: SHIPPED | OUTPATIENT
Start: 2025-01-15

## 2025-01-17 ENCOUNTER — CLINICAL SUPPORT (OUTPATIENT)
Dept: FAMILY MEDICINE | Facility: CLINIC | Age: 55
End: 2025-01-17
Payer: MEDICARE

## 2025-01-17 DIAGNOSIS — R05.9 COUGH, UNSPECIFIED TYPE: Primary | ICD-10-CM

## 2025-03-03 ENCOUNTER — TELEPHONE (OUTPATIENT)
Dept: GASTROENTEROLOGY | Facility: CLINIC | Age: 55
End: 2025-03-03
Payer: MEDICARE

## 2025-03-03 NOTE — TELEPHONE ENCOUNTER
Spoke with patient regarding questions about keeping EGD appointment that is scheduled for procedure with cardiologist this week. Informed her to get clearance from her cardiologist for this procedure and call back to reschedule if needed, verbalized good understanding.

## 2025-03-12 ENCOUNTER — TELEPHONE (OUTPATIENT)
Dept: GASTROENTEROLOGY | Facility: CLINIC | Age: 55
End: 2025-03-12
Payer: MEDICARE

## 2025-03-12 NOTE — TELEPHONE ENCOUNTER
----- Message from Janina sent at 3/12/2025  9:14 AM CDT -----  Regarding: reschedule  Who Called: Yaa Bolden is requesting assistance/information from provider's office.Symptoms (please be specific): needs to reschedule until after the 20th  Preferred Method of Contact: Phone CallPatient's Preferred Phone Number on File: [phone] Best Call Back Number, if different:Additional Information:

## 2025-03-12 NOTE — TELEPHONE ENCOUNTER
Spoke with pt she rescheduled til after she sees her cardiologist. I informed pt to have them fax us a clearance for this procedure

## 2025-03-25 ENCOUNTER — OFFICE VISIT (OUTPATIENT)
Dept: FAMILY MEDICINE | Facility: CLINIC | Age: 55
End: 2025-03-25
Payer: MEDICARE

## 2025-03-25 VITALS
WEIGHT: 257 LBS | TEMPERATURE: 98 F | SYSTOLIC BLOOD PRESSURE: 121 MMHG | HEART RATE: 90 BPM | OXYGEN SATURATION: 90 % | RESPIRATION RATE: 18 BRPM | DIASTOLIC BLOOD PRESSURE: 81 MMHG | BODY MASS INDEX: 47.01 KG/M2

## 2025-03-25 DIAGNOSIS — I50.9 CHRONIC HEART FAILURE, UNSPECIFIED HEART FAILURE TYPE: ICD-10-CM

## 2025-03-25 DIAGNOSIS — H66.003 NON-RECURRENT ACUTE SUPPURATIVE OTITIS MEDIA OF BOTH EARS WITHOUT SPONTANEOUS RUPTURE OF TYMPANIC MEMBRANES: ICD-10-CM

## 2025-03-25 DIAGNOSIS — Z99.2 ESRD (END STAGE RENAL DISEASE) ON DIALYSIS: ICD-10-CM

## 2025-03-25 DIAGNOSIS — I10 ESSENTIAL (PRIMARY) HYPERTENSION: Primary | ICD-10-CM

## 2025-03-25 DIAGNOSIS — N18.6 ESRD (END STAGE RENAL DISEASE) ON DIALYSIS: ICD-10-CM

## 2025-03-25 DIAGNOSIS — E78.5 HYPERLIPIDEMIA, UNSPECIFIED HYPERLIPIDEMIA TYPE: ICD-10-CM

## 2025-03-25 DIAGNOSIS — J45.50 SEVERE PERSISTENT ASTHMA WITHOUT COMPLICATION: ICD-10-CM

## 2025-03-25 LAB
ALBUMIN SERPL BCP-MCNC: 3.5 G/DL (ref 3.5–5)
ALBUMIN/GLOB SERPL: 1.1 {RATIO}
ALP SERPL-CCNC: 55 U/L (ref 40–150)
ALT SERPL W P-5'-P-CCNC: 7 U/L
ANION GAP SERPL CALCULATED.3IONS-SCNC: 13 MMOL/L (ref 7–16)
AST SERPL W P-5'-P-CCNC: 13 U/L (ref 11–45)
BILIRUB SERPL-MCNC: 0.7 MG/DL
BUN SERPL-MCNC: 12 MG/DL (ref 10–20)
BUN/CREAT SERPL: 2 (ref 6–20)
CALCIUM SERPL-MCNC: 8.5 MG/DL (ref 8.4–10.2)
CHLORIDE SERPL-SCNC: 93 MMOL/L (ref 98–107)
CHOLEST SERPL-MCNC: 127 MG/DL
CHOLEST/HDLC SERPL: 2.9 {RATIO}
CO2 SERPL-SCNC: 37 MMOL/L (ref 22–29)
CREAT SERPL-MCNC: 5.63 MG/DL (ref 0.55–1.02)
EGFR (NO RACE VARIABLE) (RUSH/TITUS): 8 ML/MIN/1.73M2
GLOBULIN SER-MCNC: 3.3 G/DL (ref 2–4)
GLUCOSE SERPL-MCNC: 104 MG/DL (ref 74–100)
HDLC SERPL-MCNC: 44 MG/DL (ref 35–60)
LDLC SERPL CALC-MCNC: 66 MG/DL
LDLC/HDLC SERPL: 1.5 {RATIO}
NONHDLC SERPL-MCNC: 83 MG/DL
POTASSIUM SERPL-SCNC: 2.9 MMOL/L (ref 3.5–5.1)
PROT SERPL-MCNC: 6.8 G/DL (ref 6.4–8.3)
SODIUM SERPL-SCNC: 140 MMOL/L (ref 136–145)
TRIGL SERPL-MCNC: 84 MG/DL (ref 37–140)
VLDLC SERPL-MCNC: 17 MG/DL

## 2025-03-25 PROCEDURE — 3074F SYST BP LT 130 MM HG: CPT | Mod: ,,, | Performed by: NURSE PRACTITIONER

## 2025-03-25 PROCEDURE — 3008F BODY MASS INDEX DOCD: CPT | Mod: ,,, | Performed by: NURSE PRACTITIONER

## 2025-03-25 PROCEDURE — 99214 OFFICE O/P EST MOD 30 MIN: CPT | Mod: ,,, | Performed by: NURSE PRACTITIONER

## 2025-03-25 PROCEDURE — 3079F DIAST BP 80-89 MM HG: CPT | Mod: ,,, | Performed by: NURSE PRACTITIONER

## 2025-03-25 PROCEDURE — 80061 LIPID PANEL: CPT | Mod: ,,, | Performed by: CLINICAL MEDICAL LABORATORY

## 2025-03-25 PROCEDURE — 1160F RVW MEDS BY RX/DR IN RCRD: CPT | Mod: ,,, | Performed by: NURSE PRACTITIONER

## 2025-03-25 PROCEDURE — 1159F MED LIST DOCD IN RCRD: CPT | Mod: ,,, | Performed by: NURSE PRACTITIONER

## 2025-03-25 PROCEDURE — 80053 COMPREHEN METABOLIC PANEL: CPT | Mod: ,,, | Performed by: CLINICAL MEDICAL LABORATORY

## 2025-03-25 RX ORDER — PANTOPRAZOLE SODIUM 40 MG/1
40 TABLET, DELAYED RELEASE ORAL DAILY
Qty: 90 TABLET | Refills: 1 | Status: SHIPPED | OUTPATIENT
Start: 2025-03-25

## 2025-03-25 RX ORDER — AMOXICILLIN 500 MG/1
500 TABLET, FILM COATED ORAL EVERY 12 HOURS
Qty: 20 TABLET | Refills: 0 | Status: SHIPPED | OUTPATIENT
Start: 2025-03-25 | End: 2025-04-04

## 2025-03-25 RX ORDER — OFLOXACIN 3 MG/ML
5 SOLUTION AURICULAR (OTIC) DAILY
Qty: 5 ML | Refills: 0 | Status: SHIPPED | OUTPATIENT
Start: 2025-03-25

## 2025-03-25 RX ORDER — SIMVASTATIN 20 MG/1
20 TABLET, FILM COATED ORAL NIGHTLY
Qty: 90 TABLET | Refills: 1 | Status: SHIPPED | OUTPATIENT
Start: 2025-03-25

## 2025-03-26 ENCOUNTER — RESULTS FOLLOW-UP (OUTPATIENT)
Dept: FAMILY MEDICINE | Facility: CLINIC | Age: 55
End: 2025-03-26

## 2025-03-26 NOTE — ASSESSMENT & PLAN NOTE
BP Readings from Last 3 Encounters:   03/25/25 121/81   01/13/25 (!) 174/102   12/09/24 (!) 157/97    Well-controlled today.  Continue current dose of Coreg  Patient also follow up by Dr. Tameka Iraheta in [place] for Cardiology

## 2025-03-26 NOTE — ASSESSMENT & PLAN NOTE
Currently stable on dialysis fallen Tuesday Thursday Saturday   patient does not make urine  She is follow up with Dr. Schumacher in [place]

## 2025-03-26 NOTE — ASSESSMENT & PLAN NOTE
Recently seen by Dr. Iraheta patient reports a decreased ejection fraction.  She does have a six-month follow up with scheduled with echo  Patient reports some valvular heart disease as well.  Again she is followed by Dr. Tameka Iraheta in [place]

## 2025-03-26 NOTE — ASSESSMENT & PLAN NOTE
Lab Results   Component Value Date    CHOL 127 03/25/2025    CHOL 130 12/09/2024    CHOL 109 09/04/2024     Lab Results   Component Value Date    HDL 44 03/25/2025    HDL 41 12/09/2024    HDL 48 09/04/2024     Lab Results   Component Value Date    LDLCALC 66 03/25/2025    LDLCALC 74 12/09/2024    LDLCALC 45 09/04/2024     Lab Results   Component Value Date    TRIG 84 03/25/2025    TRIG 77 12/09/2024    TRIG 81 09/04/2024       Lab Results   Component Value Date    CHOLHDL 2.9 03/25/2025    CHOLHDL 3.2 12/09/2024    CHOLHDL 2.3 09/04/2024    The current medical regimen is effective;  continue present plan and medications.   Stable on zocor  Recommend low fat low cholesterol diet

## 2025-03-26 NOTE — PROGRESS NOTES
GLENIS Sanchez   [facility] [address] [place] [address]  [phone]      PATIENT NAME: Yaa Frank  : 1970  DATE: 3/25/25  MRN: [id number]      Billing Provider: GLENIS Sanchez  Level of Service:   Patient PCP Information       Provider PCP Type    GLENIS Sanchez General            Reason for Visit / Chief Complaint: Follow-up (Mammogram due on 2025 ), Hyperlipidemia, and Hypertension       Update PCP  Update Chief Complaint         History of Present Illness / Problem Focused Workflow     Yaa Frank presents to the clinic with Follow-up (Mammogram due on 2025 ), Hyperlipidemia, and Hypertension     Pt presents for routine follow up with lab and med refills. Overall doing well.  Patient has multiple chronic medical conditions including hypertension, end-stage renal disease on dialysis, COPD on continuous home oxygen, and coronary disease. Overall she appears to be doing well    BP appears  controlled today. Home meds reviewed    The current medical regimen is effective;  continue present plan and medications. Recommended patient to check home readings to monitor and see me for followup as scheduled or sooner as needed.   Discussed sodium restriction, maintaining ideal body weight and regular exercise program as physiologic means to continue to achieve blood pressure control in addition to medication compliance.  Patient was educated that both decongestant and anti-inflammatory medication may raise blood pressure.    Advised to monitor BP at home. Advised on optimal BP readings - SBP < 130 & DBP < 80. Advised to call office for any persistent BP elevation and may have to prescribe or adjust BP med(s).  Recommended DASH diet, stay well hydrated with water daily, eliminate or decrease caffeinated and high calorie drinks, increase physical activity, and lose weight if BMI  > 25.0. Written patient education information provided to patient with goals and recommendations to assist with BP management.     Discussed need for low fat/low cholesterol diet, regular exercise, and weight control.   Cardiovascular risk and specific lipid/LDL goals reviewed.    Patient has complaints today of bilateral ear pain with some sinusitis.  Ears appear to be red and irritated we will start on some amoxicillin and some eardrops for an otitis media        Review of Systems     Review of Systems   Constitutional:  Negative for fatigue and fever.   HENT:  Positive for nasal congestion and ear pain. Negative for sore throat.    Eyes:  Negative for visual disturbance.   Respiratory:  Positive for shortness of breath. Negative for chest tightness.    Cardiovascular:  Positive for leg swelling. Negative for chest pain.   Gastrointestinal:  Negative for abdominal pain and change in bowel habit.   Endocrine: Negative for polydipsia, polyphagia and polyuria.   Genitourinary:  Negative for dysuria and hematuria.   Musculoskeletal:  Negative for back pain and leg pain.   Integumentary:  Negative for rash.   Neurological:  Negative for dizziness, syncope, weakness and light-headedness.        Medical / Social / Family History     Past Medical History:   Diagnosis Date    Asthma     CHF (congestive heart failure)     COVID-19     Disorder of kidney and ureter        Past Surgical History:   Procedure Laterality Date    APPENDECTOMY       SECTION      CHOLECYSTECTOMY      LAPAROSCOPIC GASTRIC BANDING      PLACEMENT OF DIALYSIS ACCESS Left        Social History  Ms. Frank  reports that she has never smoked. She has never been exposed to tobacco smoke. She has never used smokeless tobacco. She reports that she does not currently use alcohol. She reports that she does not use drugs.    Family History  Ms. Frakn's family history includes Breast cancer in her cousin and paternal aunt.    Medications and Allergies      Medications  Outpatient Medications Marked as Taking for the 3/25/25 encounter (Office Visit) with Corie Juárez ACNP   Medication Sig Dispense Refill    albuterol (PROVENTIL/VENTOLIN HFA) 90 mcg/actuation inhaler Inhale 2 puffs into the lungs every 4 to 6 hours as needed.      albuterol-ipratropium (DUO-NEB) 2.5 mg-0.5 mg/3 mL nebulizer solution       carvediloL (COREG) 6.25 MG tablet Take 6.25 mg by mouth 2 (two) times daily with meals.      cinacalcet (SENSIPAR) 30 MG Tab Take 30 mg by mouth every evening.      fluticasone propionate (FLONASE) 50 mcg/actuation nasal spray 1 spray (50 mcg total) by Each Nostril route once daily. 16 g 3    gabapentin (NEURONTIN) 100 MG capsule Take 1 capsule (100 mg total) by mouth 2 (two) times daily. 180 capsule 1    hydrocortisone 2.5 % cream Apply topically 2 (two) times daily. 20 g 1    sevelamer carbonate (RENVELA) 800 mg Tab Take 800 mg by mouth 4 (four) times daily with meals and nightly. 4 TABLETS WITH EACH MEAL. 2 WITH EACH SNACK.      WIXELA INHUB 250-50 mcg/dose diskus inhaler Inhale 1 puff into the lungs 2 (two) times a day.      [DISCONTINUED] midodrine (PROAMATINE) 5 MG Tab Take 5 mg by mouth 2 (two) times daily as needed. Only take on dialysis days      [DISCONTINUED] ofloxacin (FLOXIN) 0.3 % otic solution Place 5 drops into both ears once daily. 5 mL 0    [DISCONTINUED] pantoprazole (PROTONIX) 40 MG tablet Take 1 tablet (40 mg total) by mouth once daily. 90 tablet 1    [DISCONTINUED] simvastatin (ZOCOR) 20 MG tablet Take 1 tablet (20 mg total) by mouth every evening. 90 tablet 1       Allergies  Review of patient's allergies indicates:   Allergen Reactions    Ancef [cefazolin]     Hydralazine     Hydralazine analogues     Latex, natural rubber        Physical Examination     Vitals:    03/25/25 1342   BP: 121/81   Pulse: 90   Resp: 18   Temp: 98 °F (36.7 °C)     Physical Exam  HENT:      Right Ear: Tympanic membrane is erythematous.      Left Ear:  Tympanic membrane is erythematous.   Eyes:      Pupils: Pupils are equal, round, and reactive to light.   Cardiovascular:      Rate and Rhythm: Normal rate and regular rhythm.      Heart sounds: Normal heart sounds. No murmur heard.  Pulmonary:      Breath sounds: Normal breath sounds. No wheezing, rhonchi or rales.   Abdominal:      General: Bowel sounds are normal.      Palpations: Abdomen is soft.   Musculoskeletal:         General: No swelling.      Cervical back: Normal range of motion and neck supple.   Skin:     General: Skin is warm and dry.   Neurological:      Mental Status: She is alert and oriented to person, place, and time.          Lab Results   Component Value Date    WBC 2.84 (L) 12/09/2024    HGB 9.8 (L) 12/09/2024    HCT 33.5 (L) 12/09/2024    MCV 89.1 12/09/2024     (L) 12/09/2024        Sodium   Date Value Ref Range Status   03/25/2025 140 136 - 145 mmol/L Final     Potassium   Date Value Ref Range Status   03/25/2025 2.9 (L) 3.5 - 5.1 mmol/L Final     Chloride   Date Value Ref Range Status   03/25/2025 93 (L) 98 - 107 mmol/L Final     CO2   Date Value Ref Range Status   03/25/2025 37 (H) 22 - 29 mmol/L Final     Glucose   Date Value Ref Range Status   03/25/2025 104 (H) 74 - 100 mg/dL Final     BUN   Date Value Ref Range Status   03/25/2025 12 10 - 20 mg/dL Final     Creatinine   Date Value Ref Range Status   03/25/2025 5.63 (H) 0.55 - 1.02 mg/dL Final     Calcium   Date Value Ref Range Status   03/25/2025 8.5 8.4 - 10.2 mg/dL Final     Total Protein   Date Value Ref Range Status   03/25/2025 6.8 6.4 - 8.3 g/dL Final     Albumin   Date Value Ref Range Status   03/25/2025 3.5 3.5 - 5.0 g/dL Final     Bilirubin, Total   Date Value Ref Range Status   03/25/2025 0.7 <=1.5 mg/dL Final     Alk Phos   Date Value Ref Range Status   03/25/2025 55 40 - 150 U/L Final     AST   Date Value Ref Range Status   03/25/2025 13 11 - 45 U/L Final     ALT   Date Value Ref Range Status   03/25/2025 7 <=55 U/L  "Final     Anion Gap   Date Value Ref Range Status   03/25/2025 13 7 - 16 mmol/L Final     eGFR   Date Value Ref Range Status   03/25/2025 8 (L) >=60 mL/min/1.73m2 Final     Comment:     Estimated GFR calculated using the CKD-EPI creatinine (2021) equation.      Lab Results   Component Value Date    HGBA1C 4.7 05/13/2024      Lab Results   Component Value Date    CHOL 127 03/25/2025    CHOL 130 12/09/2024    CHOL 109 09/04/2024     Lab Results   Component Value Date    HDL 44 03/25/2025    HDL 41 12/09/2024    HDL 48 09/04/2024     Lab Results   Component Value Date    LDLCALC 66 03/25/2025    LDLCALC 74 12/09/2024    LDLCALC 45 09/04/2024     No results found for: "DLDL"  Lab Results   Component Value Date    TRIG 84 03/25/2025    TRIG 77 12/09/2024    TRIG 81 09/04/2024     Lab Results   Component Value Date    CHOLHDL 2.9 03/25/2025    CHOLHDL 3.2 12/09/2024    CHOLHDL 2.3 09/04/2024      Lab Results   Component Value Date    TSH 6.820 (H) 12/21/2022    FREET4 1.05 12/21/2022        Assessment and Plan (including Health Maintenance)      Problem List  Smart Sets  Document Outside HM   :    Plan:     1. Essential (primary) hypertension  Assessment & Plan:  BP Readings from Last 3 Encounters:   03/25/25 121/81   01/13/25 (!) 174/102   12/09/24 (!) 157/97    Well-controlled today.  Continue current dose of Coreg  Patient also follow up by Dr. Tameka Iraheta in [place] for Cardiology    Orders:  -     Comprehensive Metabolic Panel; Future; Expected date: 03/25/2025    2. Hyperlipidemia, unspecified hyperlipidemia type  Assessment & Plan:  Lab Results   Component Value Date    CHOL 127 03/25/2025    CHOL 130 12/09/2024    CHOL 109 09/04/2024     Lab Results   Component Value Date    HDL 44 03/25/2025    HDL 41 12/09/2024    HDL 48 09/04/2024     Lab Results   Component Value Date    LDLCALC 66 03/25/2025    LDLCALC 74 12/09/2024    LDLCALC 45 09/04/2024     Lab Results   Component Value Date    TRIG 84 03/25/2025    TRIG " 77 12/09/2024    TRIG 81 09/04/2024       Lab Results   Component Value Date    CHOLHDL 2.9 03/25/2025    CHOLHDL 3.2 12/09/2024    CHOLHDL 2.3 09/04/2024    The current medical regimen is effective;  continue present plan and medications.   Stable on zocor  Recommend low fat low cholesterol diet    Orders:  -     simvastatin (ZOCOR) 20 MG tablet; Take 1 tablet (20 mg total) by mouth every evening.  Dispense: 90 tablet; Refill: 1  -     Lipid Panel; Future; Expected date: 03/25/2025    3. Severe persistent asthma without complication  Assessment & Plan:  Patient is on continuous O2.  This has stable not recent exacerbations of COPD or asthma      4. ESRD (end stage renal disease) on dialysis  Assessment & Plan:  Currently stable on dialysis fallen Tuesday Thursday Saturday   patient does not make urine  She is follow up with Dr. Schumacher in [place]      5. Chronic heart failure, unspecified heart failure type  Overview:  Followed by cardiology Dr Sarahi Iraheta [place]     Assessment & Plan:  Recently seen by Dr. Iraheta patient reports a decreased ejection fraction.  She does have a six-month follow up with scheduled with echo  Patient reports some valvular heart disease as well.  Again she is followed by Dr. Tameka Iraheta in [place]      6. Non-recurrent acute suppurative otitis media of both ears without spontaneous rupture of tympanic membranes  -     amoxicillin (AMOXIL) 500 MG Tab; Take 1 tablet (500 mg total) by mouth every 12 (twelve) hours. for 10 days  Dispense: 20 tablet; Refill: 0  -     ofloxacin (FLOXIN) 0.3 % otic solution; Place 5 drops into both ears once daily.  Dispense: 5 mL; Refill: 0    Other orders  -     pantoprazole (PROTONIX) 40 MG tablet; Take 1 tablet (40 mg total) by mouth once daily.  Dispense: 90 tablet; Refill: 1         There are no Patient Instructions on file for this visit.     Health Maintenance Due   Topic Date Due    Mammogram  03/27/2025         Health Maintenance Topics with  due status: Not Due       Topic Last Completion Date    Cervical Cancer Screening 08/09/2021    Colorectal Cancer Screening 07/20/2022    TETANUS VACCINE 06/05/2023    Hemoglobin A1c 05/13/2024    Lipid Panel 03/25/2025    RSV Vaccine (Age 60+ and Pregnant patients) Not Due       Future Appointments   Date Time Provider Department Center   4/2/2025 11:00 AM [facility]   4/23/2025  9:30 AM [facility] GI ROOM 01 [facility]   5/12/2025 11:00 AM [facility] NURSE, [facility] [place]   7/14/2025  9:00 AM Corie Juárez ACNP [facility] [place]            Signature:  GLENIS Sanchez  [facility] [address] [place] [address]  [phone]    Date of encounter: 3/25/25

## 2025-04-02 ENCOUNTER — HOSPITAL ENCOUNTER (OUTPATIENT)
Dept: RADIOLOGY | Facility: HOSPITAL | Age: 55
Discharge: HOME | End: 2025-04-02
Attending: NURSE PRACTITIONER
Payer: MEDICARE

## 2025-04-02 ENCOUNTER — TELEPHONE (OUTPATIENT)
Dept: FAMILY MEDICINE | Facility: CLINIC | Age: 55
End: 2025-04-02
Payer: MEDICARE

## 2025-04-02 DIAGNOSIS — Z12.31 OTHER SCREENING MAMMOGRAM: ICD-10-CM

## 2025-04-02 PROCEDURE — 77063 BREAST TOMOSYNTHESIS BI: CPT | Mod: TC

## 2025-04-02 PROCEDURE — 77063 BREAST TOMOSYNTHESIS BI: CPT | Mod: 26,,, | Performed by: RADIOLOGY

## 2025-04-02 PROCEDURE — 77067 SCR MAMMO BI INCL CAD: CPT | Mod: 26,,, | Performed by: RADIOLOGY

## 2025-04-02 NOTE — TELEPHONE ENCOUNTER
I attempted to call pt back no answer left vm for return call     I'm not sure if this is for us or GI for her procedure ?

## 2025-04-02 NOTE — TELEPHONE ENCOUNTER
----- Message from Brandi sent at 4/2/2025 12:17 PM CDT -----  Regarding: Es  Patient returned call.

## 2025-04-02 NOTE — TELEPHONE ENCOUNTER
----- Message from Santa sent at 4/2/2025  8:54 AM CDT -----  Regarding: needing to send a clearance request form  Who Called: Yaa FrankPreharry Method of Contact: Phone CallPatient's Preferred Phone Number on File: [phone] Additional Information:  Mrs. Mon called and stated that her heart dr (Tameka Iraheta at [facility]) is needing a form requesting clearance results to be faxed over to you. Please send this form to Fax: [phone] Attn: Cardiology Consultant. Please send this and let them know what is being needed to free her to have her clearance to have her EGD performed.

## 2025-04-02 NOTE — TELEPHONE ENCOUNTER
Patient is going to come by here and sign a release for Dr Blue to get records from her cardiologist so she does not have to drive all the way to [place], [place]

## 2025-04-03 ENCOUNTER — RESULTS FOLLOW-UP (OUTPATIENT)
Dept: FAMILY MEDICINE | Facility: CLINIC | Age: 55
End: 2025-04-03

## 2025-04-08 ENCOUNTER — OFFICE VISIT (OUTPATIENT)
Dept: FAMILY MEDICINE | Facility: CLINIC | Age: 55
End: 2025-04-08
Payer: MEDICARE

## 2025-04-08 VITALS
DIASTOLIC BLOOD PRESSURE: 65 MMHG | TEMPERATURE: 98 F | BODY MASS INDEX: 46.38 KG/M2 | HEART RATE: 63 BPM | OXYGEN SATURATION: 99 % | HEIGHT: 62 IN | SYSTOLIC BLOOD PRESSURE: 97 MMHG | RESPIRATION RATE: 18 BRPM | WEIGHT: 252 LBS

## 2025-04-08 DIAGNOSIS — R10.84 GENERALIZED ABDOMINAL PAIN: ICD-10-CM

## 2025-04-08 DIAGNOSIS — R19.7 DIARRHEA, UNSPECIFIED TYPE: ICD-10-CM

## 2025-04-08 DIAGNOSIS — R06.02 SHORTNESS OF BREATH: ICD-10-CM

## 2025-04-08 DIAGNOSIS — R53.83 FATIGUE, UNSPECIFIED TYPE: Primary | ICD-10-CM

## 2025-04-08 PROCEDURE — 3074F SYST BP LT 130 MM HG: CPT | Mod: ,,, | Performed by: NURSE PRACTITIONER

## 2025-04-08 PROCEDURE — 3008F BODY MASS INDEX DOCD: CPT | Mod: ,,, | Performed by: NURSE PRACTITIONER

## 2025-04-08 PROCEDURE — 1160F RVW MEDS BY RX/DR IN RCRD: CPT | Mod: ,,, | Performed by: NURSE PRACTITIONER

## 2025-04-08 PROCEDURE — 3078F DIAST BP <80 MM HG: CPT | Mod: ,,, | Performed by: NURSE PRACTITIONER

## 2025-04-08 PROCEDURE — 99212 OFFICE O/P EST SF 10 MIN: CPT | Mod: ,,, | Performed by: NURSE PRACTITIONER

## 2025-04-08 PROCEDURE — 1159F MED LIST DOCD IN RCRD: CPT | Mod: ,,, | Performed by: NURSE PRACTITIONER

## 2025-04-08 RX ORDER — MIDODRINE HYDROCHLORIDE 2.5 MG/1
2.5 TABLET ORAL EVERY 12 HOURS
Qty: 60 TABLET | Refills: 0 | Status: CANCELLED | OUTPATIENT
Start: 2025-04-08 | End: 2026-04-08

## 2025-04-08 NOTE — PROGRESS NOTES
GLENIS Sanchez   [facility] [address] [place] [address]  [phone]      PATIENT NAME: Yaa Frank  : 1970  DATE: 25  MRN: [id number]      Billing Provider: GLENIS Sanchez  Level of Service:   Patient PCP Information       Provider PCP Type    GLENIS Sanchez General            Reason for Visit / Chief Complaint: Abdominal Pain       Update PCP  Update Chief Complaint         History of Present Illness / Problem Focused Workflow     Yaa Frank presents to the clinic with Abdominal Pain     Pt presents with multiple complaints of abdominal pain, persistent diarrhea, dizziness, fatigue, headache. She has multiple chronic medical conditions including ESRD on dialysis TTSat, htn with hypotension with dialysis, CVD followed by Dr Iraheta in [place], CHF,  COPD. Pt was recently seen at [facility] for this acute abdominal pain. She reports she had a CT scan but was unable to have with contrast due to renal disease. Pt is crying in my office because of the pain and not feeling well. Discussed with pt due to her medical history and multiple complaints she would be better served going to the ER at [facility]. Her pulmonary, cardiology and nephrology specialist are all at [facility]. She likely needs a CT with contrast of the abdomen and pelvis. She is also likely dehydrated because of the persistent diarrhea and dialysis.     Pt is going POV to Er at [facility].         Review of Systems     Review of Systems   Constitutional:  Positive for fatigue.   Respiratory:  Positive for shortness of breath.    Gastrointestinal:  Positive for abdominal pain, diarrhea and nausea.   Musculoskeletal:  Positive for myalgias.   Neurological:  Positive for weakness and light-headedness.        Medical / Social / Family History     Past Medical History:   Diagnosis Date    Asthma     CHF (congestive heart failure)      COVID-19     Disorder of kidney and ureter        Past Surgical History:   Procedure Laterality Date    APPENDECTOMY       SECTION      CHOLECYSTECTOMY      LAPAROSCOPIC GASTRIC BANDING      PLACEMENT OF DIALYSIS ACCESS Left        Social History  Ms. Frank  reports that she has never smoked. She has never been exposed to tobacco smoke. She has never used smokeless tobacco. She reports that she does not currently use alcohol. She reports that she does not use drugs.    Family History  Ms. Frank's family history includes Breast cancer in her cousin and paternal aunt.    Medications and Allergies     Medications  Outpatient Medications Marked as Taking for the 25 encounter (Office Visit) with Corie Juárez ACNP   Medication Sig Dispense Refill    albuterol (PROVENTIL/VENTOLIN HFA) 90 mcg/actuation inhaler Inhale 2 puffs into the lungs every 4 to 6 hours as needed.      albuterol-ipratropium (DUO-NEB) 2.5 mg-0.5 mg/3 mL nebulizer solution       carvediloL (COREG) 6.25 MG tablet Take 6.25 mg by mouth 2 (two) times daily with meals.      cinacalcet (SENSIPAR) 30 MG Tab Take 30 mg by mouth every evening.      fluticasone propionate (FLONASE) 50 mcg/actuation nasal spray 1 spray (50 mcg total) by Each Nostril route once daily. 16 g 3    gabapentin (NEURONTIN) 100 MG capsule Take 1 capsule (100 mg total) by mouth 2 (two) times daily. 180 capsule 1    hydrocortisone 2.5 % cream Apply topically 2 (two) times daily. 20 g 1    ofloxacin (FLOXIN) 0.3 % otic solution Place 5 drops into both ears once daily. 5 mL 0    pantoprazole (PROTONIX) 40 MG tablet Take 1 tablet (40 mg total) by mouth once daily. 90 tablet 1    sevelamer carbonate (RENVELA) 800 mg Tab Take 800 mg by mouth 4 (four) times daily with meals and nightly. 4 TABLETS WITH EACH MEAL. 2 WITH EACH SNACK.      simvastatin (ZOCOR) 20 MG tablet Take 1 tablet (20 mg total) by mouth every evening. 90 tablet 1    WIXELA INHUB 250-50 mcg/dose diskus  inhaler Inhale 1 puff into the lungs 2 (two) times a day.         Allergies  Review of patient's allergies indicates:   Allergen Reactions    Ancef [cefazolin]     Hydralazine     Hydralazine analogues     Latex, natural rubber        Physical Examination     Vitals:    04/08/25 1335   BP: 97/65   Pulse: 63   Resp: 18   Temp: 98.1 °F (36.7 °C)     Physical Exam  Constitutional:       General: She is not in acute distress.     Appearance: She is ill-appearing.   Eyes:      Pupils: Pupils are equal, round, and reactive to light.   Cardiovascular:      Rate and Rhythm: Normal rate and regular rhythm.      Heart sounds: Normal heart sounds. No murmur heard.  Pulmonary:      Breath sounds: Normal breath sounds. No wheezing, rhonchi or rales.   Abdominal:      General: Bowel sounds are normal.      Palpations: Abdomen is soft. There is no mass.      Tenderness: There is abdominal tenderness. There is no rebound.      Hernia: No hernia is present.   Musculoskeletal:         General: No swelling.      Cervical back: Normal range of motion and neck supple.   Skin:     General: Skin is warm and dry.   Neurological:      Mental Status: She is alert and oriented to person, place, and time.          Lab Results   Component Value Date    WBC 2.84 (L) 12/09/2024    HGB 9.8 (L) 12/09/2024    HCT 33.5 (L) 12/09/2024    MCV 89.1 12/09/2024     (L) 12/09/2024        Sodium   Date Value Ref Range Status   03/25/2025 140 136 - 145 mmol/L Final     Potassium   Date Value Ref Range Status   03/25/2025 2.9 (L) 3.5 - 5.1 mmol/L Final     Chloride   Date Value Ref Range Status   03/25/2025 93 (L) 98 - 107 mmol/L Final     CO2   Date Value Ref Range Status   03/25/2025 37 (H) 22 - 29 mmol/L Final     Glucose   Date Value Ref Range Status   03/25/2025 104 (H) 74 - 100 mg/dL Final     BUN   Date Value Ref Range Status   03/25/2025 12 10 - 20 mg/dL Final     Creatinine   Date Value Ref Range Status   03/25/2025 5.63 (H) 0.55 - 1.02 mg/dL  "Final     Calcium   Date Value Ref Range Status   03/25/2025 8.5 8.4 - 10.2 mg/dL Final     Total Protein   Date Value Ref Range Status   03/25/2025 6.8 6.4 - 8.3 g/dL Final     Albumin   Date Value Ref Range Status   03/25/2025 3.5 3.5 - 5.0 g/dL Final     Bilirubin, Total   Date Value Ref Range Status   03/25/2025 0.7 <=1.5 mg/dL Final     Alk Phos   Date Value Ref Range Status   03/25/2025 55 40 - 150 U/L Final     AST   Date Value Ref Range Status   03/25/2025 13 11 - 45 U/L Final     ALT   Date Value Ref Range Status   03/25/2025 7 <=55 U/L Final     Anion Gap   Date Value Ref Range Status   03/25/2025 13 7 - 16 mmol/L Final     eGFR   Date Value Ref Range Status   03/25/2025 8 (L) >=60 mL/min/1.73m2 Final     Comment:     Estimated GFR calculated using the CKD-EPI creatinine (2021) equation.      Lab Results   Component Value Date    HGBA1C 4.7 05/13/2024      Lab Results   Component Value Date    CHOL 127 03/25/2025    CHOL 130 12/09/2024    CHOL 109 09/04/2024     Lab Results   Component Value Date    HDL 44 03/25/2025    HDL 41 12/09/2024    HDL 48 09/04/2024     Lab Results   Component Value Date    LDLCALC 66 03/25/2025    LDLCALC 74 12/09/2024    LDLCALC 45 09/04/2024     No results found for: "DLDL"  Lab Results   Component Value Date    TRIG 84 03/25/2025    TRIG 77 12/09/2024    TRIG 81 09/04/2024     Lab Results   Component Value Date    CHOLHDL 2.9 03/25/2025    CHOLHDL 3.2 12/09/2024    CHOLHDL 2.3 09/04/2024      Lab Results   Component Value Date    TSH 6.820 (H) 12/21/2022    FREET4 1.05 12/21/2022        Assessment and Plan (including Health Maintenance)      Problem List  Smart Sets  Document Outside HM   :    Plan:     1. Fatigue, unspecified type    2. Generalized abdominal pain    3. Diarrhea, unspecified type    4. Shortness of breath         There are no Patient Instructions on file for this visit.     There are no preventive care reminders to display for this patient.      Health " Maintenance Topics with due status: Not Due       Topic Last Completion Date    Cervical Cancer Screening 08/09/2021    Colorectal Cancer Screening 07/20/2022    TETANUS VACCINE 06/05/2023    Hemoglobin A1c 05/13/2024    Lipid Panel 03/25/2025    Mammogram 04/02/2025    RSV Vaccine (Age 60+ and Pregnant patients) Not Due       Future Appointments   Date Time Provider Department Center   4/23/2025  9:30 AM [facility] GI ROOM 01 [facility]   5/12/2025 11:00 AM AW[facility] NURSE, [facility] FAMILY MEDICINE [facility] AMAYA [place]   7/14/2025  9:00 AM Corie Juárez ACNP [facility] [place]   4/8/2026  2:00 PM [facility]            Signature:  GLENIS Sanchez  [facility] [address] [place] [address]  [phone]    Date of encounter: 4/8/25

## 2025-05-07 ENCOUNTER — OFFICE VISIT (OUTPATIENT)
Dept: FAMILY MEDICINE | Facility: CLINIC | Age: 55
End: 2025-05-07
Payer: MEDICARE

## 2025-05-07 VITALS
RESPIRATION RATE: 18 BRPM | BODY MASS INDEX: 46.46 KG/M2 | SYSTOLIC BLOOD PRESSURE: 144 MMHG | HEIGHT: 62 IN | TEMPERATURE: 98 F | HEART RATE: 95 BPM | OXYGEN SATURATION: 89 % | DIASTOLIC BLOOD PRESSURE: 90 MMHG | WEIGHT: 252.5 LBS

## 2025-05-07 DIAGNOSIS — Z90.49 HISTORY OF BOWEL RESECTION: ICD-10-CM

## 2025-05-07 DIAGNOSIS — Z93.3 COLOSTOMY IN PLACE: ICD-10-CM

## 2025-05-07 DIAGNOSIS — Z09 HOSPITAL DISCHARGE FOLLOW-UP: Primary | ICD-10-CM

## 2025-05-07 PROCEDURE — 99214 OFFICE O/P EST MOD 30 MIN: CPT | Mod: ,,, | Performed by: NURSE PRACTITIONER

## 2025-05-07 PROCEDURE — 1159F MED LIST DOCD IN RCRD: CPT | Mod: ,,, | Performed by: NURSE PRACTITIONER

## 2025-05-07 PROCEDURE — 1160F RVW MEDS BY RX/DR IN RCRD: CPT | Mod: ,,, | Performed by: NURSE PRACTITIONER

## 2025-05-07 PROCEDURE — 3080F DIAST BP >= 90 MM HG: CPT | Mod: ,,, | Performed by: NURSE PRACTITIONER

## 2025-05-07 PROCEDURE — 3077F SYST BP >= 140 MM HG: CPT | Mod: ,,, | Performed by: NURSE PRACTITIONER

## 2025-05-07 RX ORDER — OXYCODONE AND ACETAMINOPHEN 5; 325 MG/1; MG/1
1 TABLET ORAL EVERY 6 HOURS PRN
COMMUNITY
Start: 2025-05-02

## 2025-05-07 NOTE — PROGRESS NOTES
"   GLENIS Sanchez   [facility] [address] [place] [address]  [phone]      PATIENT NAME: Yaa Frank  : 1970  DATE: 25  MRN: [id number]      Billing Provider: GLENIS Sanchez  Level of Service:   Patient PCP Information       Provider PCP Type    GLENIS Sanchez General            Reason for Visit / Chief Complaint: Follow-up (Hospital f/u)       Update PCP  Update Chief Complaint         History of Present Illness / Problem Focused Workflow     Yaa Frank presents to the clinic with Follow-up (Hospital f/u)     Transitional Care Note    Family and/or Caretaker present at visit?  Yes.  Diagnostic tests reviewed/disposition: No diagnosic tests pending after this hospitalization.  Disease/illness education: abdominal surgery with wound vac and colostomy. (Have requested records)  Home health/community services discussion/referrals: Patient has home health established at [facility]  Establishment or re-establishment of referral orders for community resources: No other necessary community resources.   Discussion with other health care providers: No discussion with other health care providers necessary.      "Medication list from hospital discharge will be reconciled with clinic medication list & updated in patient's chart.  Discharge summary will be reviewed by provider."  Medical records from [facility] have been requested. Pt d/c paperwork was reviewed with pt. She is unclear when and with whom she is suppose to follow up from [facility]/surgery. The only provider listed on her d/c reports his her nephrologist.  is also trying to help make sure pt has a d/c follow up with surgery for wound care.         Review of Systems     Review of Systems   Constitutional:  Negative for fatigue and fever.   HENT:  Negative for nasal congestion and sore throat.    Eyes:  Negative for visual disturbance. "   Respiratory:  Positive for shortness of breath. Negative for chest tightness.    Cardiovascular:  Negative for chest pain and leg swelling.   Gastrointestinal:  Positive for abdominal pain. Negative for change in bowel habit.   Endocrine: Negative for polydipsia, polyphagia and polyuria.   Genitourinary:  Negative for dysuria and hematuria.   Musculoskeletal:  Negative for back pain and leg pain.   Integumentary:  Negative for rash.   Neurological:  Negative for dizziness, syncope, weakness and light-headedness.        Medical / Social / Family History     Past Medical History:   Diagnosis Date    Asthma     CHF (congestive heart failure)     COVID-19     Disorder of kidney and ureter        Past Surgical History:   Procedure Laterality Date    APPENDECTOMY       SECTION      CHOLECYSTECTOMY      LAPAROSCOPIC GASTRIC BANDING      PLACEMENT OF DIALYSIS ACCESS Left        Social History  Ms. Frank  reports that she has never smoked. She has never been exposed to tobacco smoke. She has never used smokeless tobacco. She reports that she does not currently use alcohol. She reports that she does not use drugs.    Family History  Ms. Frank's family history includes Breast cancer in her cousin and paternal aunt.    Medications and Allergies     Medications  Outpatient Medications Marked as Taking for the 25 encounter (Office Visit) with Corie Juárez ACNP   Medication Sig Dispense Refill    albuterol (PROVENTIL/VENTOLIN HFA) 90 mcg/actuation inhaler Inhale 2 puffs into the lungs every 4 to 6 hours as needed.      albuterol-ipratropium (DUO-NEB) 2.5 mg-0.5 mg/3 mL nebulizer solution       carvediloL (COREG) 6.25 MG tablet Take 6.25 mg by mouth 2 (two) times daily with meals.      cinacalcet (SENSIPAR) 30 MG Tab Take 30 mg by mouth every evening.      fluticasone propionate (FLONASE) 50 mcg/actuation nasal spray 1 spray (50 mcg total) by Each Nostril route once daily. 16 g 3    gabapentin (NEURONTIN) 100  MG capsule Take 1 capsule (100 mg total) by mouth 2 (two) times daily. 180 capsule 1    hydrocortisone 2.5 % cream Apply topically 2 (two) times daily. 20 g 1    ofloxacin (FLOXIN) 0.3 % otic solution Place 5 drops into both ears once daily. 5 mL 0    oxyCODONE-acetaminophen (PERCOCET) 5-325 mg per tablet Take 1 tablet by mouth every 6 (six) hours as needed.      pantoprazole (PROTONIX) 40 MG tablet Take 1 tablet (40 mg total) by mouth once daily. 90 tablet 1    sevelamer carbonate (RENVELA) 800 mg Tab Take 800 mg by mouth 4 (four) times daily with meals and nightly. 4 TABLETS WITH EACH MEAL. 2 WITH EACH SNACK.      simvastatin (ZOCOR) 20 MG tablet Take 1 tablet (20 mg total) by mouth every evening. 90 tablet 1    WIXELA INHUB 250-50 mcg/dose diskus inhaler Inhale 1 puff into the lungs 2 (two) times a day.         Allergies  Review of patient's allergies indicates:   Allergen Reactions    Ancef [cefazolin]     Hydralazine     Hydralazine analogues     Latex, natural rubber        Physical Examination     Vitals:    05/07/25 1401   BP: (!) 144/90   Pulse: 95   Resp: 18   Temp: 97.9 °F (36.6 °C)     Physical Exam  Eyes:      Pupils: Pupils are equal, round, and reactive to light.   Cardiovascular:      Rate and Rhythm: Normal rate and regular rhythm.      Heart sounds: Normal heart sounds. No murmur heard.  Pulmonary:      Breath sounds: Normal breath sounds. No wheezing, rhonchi or rales.   Abdominal:      General: Bowel sounds are normal.          Comments: Midline surgical site with wound vac in place  Colostomy noted to LUQ stoma pink. Non tender    Musculoskeletal:         General: No swelling.      Cervical back: Normal range of motion and neck supple.   Skin:     General: Skin is warm and dry.   Neurological:      Mental Status: She is alert and oriented to person, place, and time.          Lab Results   Component Value Date    WBC 2.84 (L) 12/09/2024    HGB 9.8 (L) 12/09/2024    HCT 33.5 (L) 12/09/2024    MCV  "89.1 12/09/2024     (L) 12/09/2024        Sodium   Date Value Ref Range Status   03/25/2025 140 136 - 145 mmol/L Final     Potassium   Date Value Ref Range Status   03/25/2025 2.9 (L) 3.5 - 5.1 mmol/L Final     Chloride   Date Value Ref Range Status   03/25/2025 93 (L) 98 - 107 mmol/L Final     CO2   Date Value Ref Range Status   03/25/2025 37 (H) 22 - 29 mmol/L Final     Glucose   Date Value Ref Range Status   03/25/2025 104 (H) 74 - 100 mg/dL Final     BUN   Date Value Ref Range Status   03/25/2025 12 10 - 20 mg/dL Final     Creatinine   Date Value Ref Range Status   03/25/2025 5.63 (H) 0.55 - 1.02 mg/dL Final     Calcium   Date Value Ref Range Status   03/25/2025 8.5 8.4 - 10.2 mg/dL Final     Total Protein   Date Value Ref Range Status   03/25/2025 6.8 6.4 - 8.3 g/dL Final     Albumin   Date Value Ref Range Status   03/25/2025 3.5 3.5 - 5.0 g/dL Final     Bilirubin, Total   Date Value Ref Range Status   03/25/2025 0.7 <=1.5 mg/dL Final     Alk Phos   Date Value Ref Range Status   03/25/2025 55 40 - 150 U/L Final     AST   Date Value Ref Range Status   03/25/2025 13 11 - 45 U/L Final     ALT   Date Value Ref Range Status   03/25/2025 7 <=55 U/L Final     Anion Gap   Date Value Ref Range Status   03/25/2025 13 7 - 16 mmol/L Final     eGFR   Date Value Ref Range Status   03/25/2025 8 (L) >=60 mL/min/1.73m2 Final     Comment:     Estimated GFR calculated using the CKD-EPI creatinine (2021) equation.      Lab Results   Component Value Date    HGBA1C 4.7 05/13/2024      Lab Results   Component Value Date    CHOL 127 03/25/2025    CHOL 130 12/09/2024    CHOL 109 09/04/2024     Lab Results   Component Value Date    HDL 44 03/25/2025    HDL 41 12/09/2024    HDL 48 09/04/2024     Lab Results   Component Value Date    LDLCALC 66 03/25/2025    LDLCALC 74 12/09/2024    LDLCALC 45 09/04/2024     No results found for: "DLDL"  Lab Results   Component Value Date    TRIG 84 03/25/2025    TRIG 77 12/09/2024    TRIG 81 " 09/04/2024     Lab Results   Component Value Date    CHOLHDL 2.9 03/25/2025    CHOLHDL 3.2 12/09/2024    CHOLHDL 2.3 09/04/2024      Lab Results   Component Value Date    TSH 6.820 (H) 12/21/2022    FREET4 1.05 12/21/2022        Assessment and Plan (including Health Maintenance)      Problem List  Smart Sets  Document Outside HM   :    Plan:     1. Hospital discharge follow-up    2. Colostomy in place    3. History of bowel resection         There are no Patient Instructions on file for this visit.     Health Maintenance Due   Topic Date Due    Hemoglobin A1c  05/13/2025         Health Maintenance Topics with due status: Not Due       Topic Last Completion Date    Cervical Cancer Screening 08/09/2021    Colorectal Cancer Screening 07/20/2022    TETANUS VACCINE 06/05/2023    Lipid Panel 03/25/2025    Mammogram 04/02/2025    RSV Vaccine (Age 60+ and Pregnant patients) Not Due       Future Appointments   Date Time Provider Department Center   5/12/2025 11:00 AM AWV NURSE, [facility]   7/14/2025  9:00 AM Corie Juárez ACNP [facility]   4/8/2026  2:00 PM [facility]            Signature:  GLENIS Sanchez [facility] [address] [place] [address]  [phone]    Date of encounter: 5/7/25

## 2025-05-12 ENCOUNTER — OFFICE VISIT (OUTPATIENT)
Dept: FAMILY MEDICINE | Facility: CLINIC | Age: 55
End: 2025-05-12
Payer: MEDICARE

## 2025-05-12 VITALS
OXYGEN SATURATION: 95 % | RESPIRATION RATE: 19 BRPM | HEART RATE: 92 BPM | WEIGHT: 252 LBS | HEIGHT: 62 IN | DIASTOLIC BLOOD PRESSURE: 93 MMHG | BODY MASS INDEX: 46.38 KG/M2 | SYSTOLIC BLOOD PRESSURE: 147 MMHG

## 2025-05-12 DIAGNOSIS — Z78.9 ON SUPPLEMENTAL OXYGEN BY NASAL CANNULA: ICD-10-CM

## 2025-05-12 DIAGNOSIS — Z99.2 ESRD (END STAGE RENAL DISEASE) ON DIALYSIS: ICD-10-CM

## 2025-05-12 DIAGNOSIS — E78.5 HYPERLIPIDEMIA, UNSPECIFIED HYPERLIPIDEMIA TYPE: ICD-10-CM

## 2025-05-12 DIAGNOSIS — Z00.00 ENCOUNTER FOR SUBSEQUENT ANNUAL WELLNESS VISIT (AWV) IN MEDICARE PATIENT: Primary | ICD-10-CM

## 2025-05-12 DIAGNOSIS — Z74.09 OTHER REDUCED MOBILITY: ICD-10-CM

## 2025-05-12 DIAGNOSIS — E66.813 CLASS 3 SEVERE OBESITY DUE TO EXCESS CALORIES WITHOUT SERIOUS COMORBIDITY WITH BODY MASS INDEX (BMI) OF 45.0 TO 49.9 IN ADULT: ICD-10-CM

## 2025-05-12 DIAGNOSIS — I10 ESSENTIAL (PRIMARY) HYPERTENSION: ICD-10-CM

## 2025-05-12 DIAGNOSIS — I50.9 CHRONIC HEART FAILURE, UNSPECIFIED HEART FAILURE TYPE: ICD-10-CM

## 2025-05-12 DIAGNOSIS — E66.01 CLASS 3 SEVERE OBESITY DUE TO EXCESS CALORIES WITHOUT SERIOUS COMORBIDITY WITH BODY MASS INDEX (BMI) OF 45.0 TO 49.9 IN ADULT: ICD-10-CM

## 2025-05-12 DIAGNOSIS — N18.6 ESRD (END STAGE RENAL DISEASE) ON DIALYSIS: ICD-10-CM

## 2025-05-12 DIAGNOSIS — Z93.3 COLOSTOMY IN PLACE: ICD-10-CM

## 2025-05-12 DIAGNOSIS — J45.50 SEVERE PERSISTENT ASTHMA WITHOUT COMPLICATION: ICD-10-CM

## 2025-05-12 PROCEDURE — 3079F DIAST BP 80-89 MM HG: CPT | Mod: ,,, | Performed by: NURSE PRACTITIONER

## 2025-05-12 PROCEDURE — 3077F SYST BP >= 140 MM HG: CPT | Mod: ,,, | Performed by: NURSE PRACTITIONER

## 2025-05-12 PROCEDURE — 1159F MED LIST DOCD IN RCRD: CPT | Mod: ,,, | Performed by: NURSE PRACTITIONER

## 2025-05-12 PROCEDURE — G0439 PPPS, SUBSEQ VISIT: HCPCS | Mod: ,,, | Performed by: NURSE PRACTITIONER

## 2025-05-12 PROCEDURE — 1160F RVW MEDS BY RX/DR IN RCRD: CPT | Mod: ,,, | Performed by: NURSE PRACTITIONER

## 2025-05-12 NOTE — PATIENT INSTRUCTIONS
Counseling and Referral of Other Preventative  (Italic type indicates deductible and co-insurance are waived)    Patient Name: Yaa Frank  Today's Date: 5/12/2025    Health Maintenance       Date Due Completion Date    Shingles Vaccine (2 of 2) 07/31/2023 6/5/2023    Hemoglobin A1c 05/13/2025 5/13/2024    Mammogram 04/02/2026 4/2/2025    Cervical Cancer Screening 08/09/2026 8/9/2021    Lipid Panel 03/25/2030 3/25/2025    Colorectal Cancer Screening 07/20/2032 7/20/2022    TETANUS VACCINE 06/05/2033 6/5/2023    RSV Vaccine (Age 60+ and Pregnant patients) (1 - 1-dose 75+ series) 02/28/2047 ---        No orders of the defined types were placed in this encounter.    The following information is provided to all patients.  This information is to help you find resources for any of the problems found today that may be affecting your health:                  Living healthy guide: [facility].gov    Understanding Diabetes: www.diabetes.org      Eating healthy: www.cdc.gov/healthyweight      [facility] home safety checklist: www.cdc.gov/steadi/patient.html      Agency on Aging: [facility].org    Alcoholics anonymous (AA): www.aa.org      Physical Activity: www.breana.nih.gov/vp8igao      Tobacco use: [facility].gov

## 2025-05-12 NOTE — PROGRESS NOTES
" [facility]       PATIENT NAME: Yaa Frank   : 1970    AGE: 55 y.o. DATE: 2025   MRN: [id number]       Yaa Frank presented for a follow-up Medicare AWV today. The following components were reviewed and updated:    Medical history  Family History  Social history  Allergies and Current Medications  Health Risk Assessment  Health Maintenance  Care Team    **See Completed Assessments for Annual Wellness visit with in the encounter summary    The following assessments were completed:  Depression Screening  Cognitive function Screening  Timed Get Up Test  Whisper Test      Opioid documentation:      Patient does not have a current opioid prescription.          Vitals:    25 1125 25 1139   BP: (!) 148/87 (!) 147/93   BP Location: Right arm Right arm   Patient Position: Sitting Sitting   Pulse: 92    Resp: 19    SpO2: 95%    Weight: 114.3 kg (252 lb)    Height: 5' 2" (1.575 m)    PF: (!) 2 L/min      Body mass index is 46.09 kg/m².       Physical Exam  Eyes:      Pupils: Pupils are equal, round, and reactive to light.   Cardiovascular:      Rate and Rhythm: Normal rate and regular rhythm.      Heart sounds: Normal heart sounds. No murmur heard.  Pulmonary:      Breath sounds: Normal breath sounds. No wheezing, rhonchi or rales.   Abdominal:      General: Bowel sounds are normal.      Palpations: Abdomen is soft.      Tenderness: There is abdominal tenderness.   Musculoskeletal:         General: No swelling.      Cervical back: Normal range of motion and neck supple.   Skin:     General: Skin is warm and dry.   Neurological:      Mental Status: She is alert and oriented to person, place, and time.        Diagnoses and health risks identified today and associated recommendations/orders:  1. Encounter for subsequent annual wellness visit (AWV) in Medicare patient  Gave appt reminder for next year's AWV    2. ESRD (end stage " "renal disease) on dialysis  Lab Results   Component Value Date    CREATININE 5.63 (H) 03/25/2025    BUN 12 03/25/2025     03/25/2025    K 2.9 (L) 03/25/2025    CL 93 (L) 03/25/2025    CO2 37 (H) 03/25/2025    On dialysis TTSat  Followed by Dr Schumacher in [place]    3. Severe persistent asthma without complication  The current medical regimen is effective;  continue present plan and medications.  On cont O2  Stable    4. Chronic heart failure, unspecified heart failure type  The current medical regimen is effective;  continue present plan and medications.  Stable followed by Dr Iraheta in [place]    5. Essential (primary) hypertension  BP Readings from Last 3 Encounters:   05/12/25 (!) 147/93   05/07/25 (!) 144/90   04/08/25 97/65    Cont coreg,     6. Hyperlipidemia, unspecified hyperlipidemia type  Lab Results   Component Value Date    CHOL 127 03/25/2025    CHOL 130 12/09/2024    CHOL 109 09/04/2024      Lab Results   Component Value Date    HDL 44 03/25/2025    HDL 41 12/09/2024    HDL 48 09/04/2024     Lab Results   Component Value Date    LDLCALC 66 03/25/2025    LDLCALC 74 12/09/2024    LDLCALC 45 09/04/2024      Lab Results   Component Value Date    TRIG 84 03/25/2025    TRIG 77 12/09/2024    TRIG 81 09/04/2024     No results found for: "TOTALCHOLEST"  Lab Results   Component Value Date    NONHDLCHOL 83 03/25/2025    NONHDLCHOL 89 12/09/2024    NONHDLCHOL 61 09/04/2024     Lab Results   Component Value Date    CHOLHDL 2.9 03/25/2025    CHOLHDL 3.2 12/09/2024    CHOLHDL 2.3 09/04/2024    Cont zocor  Stable    7. Colostomy in place  Stoma is pink  Doing well     8. On supplemental oxygen by nasal cannula  Cont O2  Stable     9. Other reduced mobility  Prevent falls by wearing good non-skid shoes    10. Class 3 severe obesity due to excess calories without serious comorbidity with body mass index (BMI) of 45.0 to 49.9 in adult  Encouraged diet/exercise as tolerated to decrease weight/BMI    Health " Maintenance Due   Topic Date Due    Shingles Vaccine (2 of 2) 07/31/2023    Hemoglobin A1c  05/13/2025   *Encouraged Shingles vaccine at pharmacy.    Provided Yaa with a 5-10 year written screening schedule and personal prevention plan. Recommendations were developed using the USPSTF age appropriate recommendations. Education, counseling, and referrals were provided as needed.  After Visit Summary printed and given to patient which includes a list of additional screenings\tests needed.    Follow up in about 1 year (around 5/12/2026) for AWV follow-up.    I offered to discuss advanced care planning, including how to pick a person who would make decisions for you if you were unable to make them for yourself, called a health care power of , and what kind of decisions you might make such as use of life sustaining treatments such as ventilators and tube feeding when faced with a life limiting illness recorded on a living will that they will need to know. (How you want to be cared for as you near the end of your natural life)     X Patient is interested in learning more about how to make advanced directives.  I provided them paperwork and offered to discuss this with them.    Signature:

## 2025-05-21 ENCOUNTER — TELEPHONE (OUTPATIENT)
Dept: FAMILY MEDICINE | Facility: CLINIC | Age: 55
End: 2025-05-21
Payer: MEDICARE

## 2025-05-21 NOTE — TELEPHONE ENCOUNTER
Copied from CRM #[id number]. Topic: General Inquiry - Patient Advice  >> May 20, 2025  4:00 PM Jose R wrote:  Who Called: Yaa Frank    Pt is calling to let us know that her surgeons name DR Delfin rockwell. Office number [phone]    Preferred Method of Contact: Phone Call  Patient's Preferred Phone Number on File: [phone]   Best Call Back Number, if different:  Additional Information:

## 2025-06-03 ENCOUNTER — TELEPHONE (OUTPATIENT)
Dept: FAMILY MEDICINE | Facility: CLINIC | Age: 55
End: 2025-06-03
Payer: MEDICARE

## 2025-06-05 ENCOUNTER — HOSPITAL ENCOUNTER (EMERGENCY)
Facility: HOSPITAL | Age: 55
Discharge: HOME | End: 2025-06-05
Payer: MEDICARE

## 2025-06-05 VITALS
HEART RATE: 104 BPM | TEMPERATURE: 98 F | SYSTOLIC BLOOD PRESSURE: 133 MMHG | DIASTOLIC BLOOD PRESSURE: 79 MMHG | BODY MASS INDEX: 47.11 KG/M2 | OXYGEN SATURATION: 94 % | HEIGHT: 62 IN | RESPIRATION RATE: 16 BRPM | WEIGHT: 256 LBS

## 2025-06-05 DIAGNOSIS — M79.604 RIGHT LEG PAIN: Primary | ICD-10-CM

## 2025-06-05 DIAGNOSIS — R60.9 SWELLING: ICD-10-CM

## 2025-06-05 PROCEDURE — 99284 EMERGENCY DEPT VISIT MOD MDM: CPT | Mod: 25

## 2025-06-05 NOTE — ED PROVIDER NOTES
Encounter Date: 2025       History     Chief Complaint   Patient presents with    Leg Swelling    Leg Pain     Pt presents to ED for R leg (calf) swelling and pain. Pt states that this swelling has been ongoing for 3 weeks and originally involved the entire leg. She states that they have been pulling extra fluid in dialysis to help the swelling but the calf remains swollen and painful. Sent to ED for DVT eval.      55 year old female presents to ED with complaint of leg swelling. Patient reports being in the hospital at [facility] in April and discharged. She reports she was admitted for extended time and was discharged with wound vac. She states she was discontinued from the wound vac and does dressing changes to the area. She states she experienced swelling and has history of CHF. She states they initially tried to remove the extra fluid with her having more pulled off with dialysis. She states swelling improved but continued to her right calf. She reports pain and tenderness to the area. She reports she was instructed to come to ED for evaluation of DVT with recent surgery. Patient is status post exploratory lap with lysis of adhesions and colostomy. Patient had perforated sigmoid colon. Denies chest pain, shortness of breath, nausea/vomiting, fever, chills.     The history is provided by the patient and medical records. No  was used.     Review of patient's allergies indicates:   Allergen Reactions    Ancef [cefazolin]     Hydralazine     Hydralazine analogues     Latex, natural rubber      Past Medical History:   Diagnosis Date    Asthma     CHF (congestive heart failure)     COVID-19     Disorder of kidney and ureter      Past Surgical History:   Procedure Laterality Date    APPENDECTOMY       SECTION      CHOLECYSTECTOMY      COLOSTOMY  2025    LAPAROSCOPIC GASTRIC BANDING      PLACEMENT OF DIALYSIS ACCESS Left      Family History   Problem Relation Name Age of Onset    Breast  cancer Paternal Aunt      Breast cancer Cousin       Social History[1]  Review of Systems   Constitutional:  Negative for chills and fatigue.   Eyes:  Negative for photophobia and visual disturbance.   Respiratory:  Negative for cough and shortness of breath.    Cardiovascular:  Positive for leg swelling. Negative for chest pain.   Gastrointestinal:  Negative for nausea and vomiting.   Musculoskeletal:  Positive for arthralgias and myalgias.   Skin:  Negative for color change and wound.   Neurological:  Negative for dizziness and weakness.   Hematological:  Negative for adenopathy. Does not bruise/bleed easily.   Psychiatric/Behavioral:  Negative for agitation and confusion.    All other systems reviewed and are negative.      Physical Exam     Initial Vitals [25 1658]   BP Pulse Resp Temp SpO2   133/79 104 16 98.4 °F (36.9 °C) (!) 94 %      MAP       --         Physical Exam    Nursing note and vitals reviewed.  Constitutional: She appears well-developed and well-nourished. Nasal cannula in place.   HENT:   Head: Normocephalic and atraumatic.   Eyes: EOM are normal. Pupils are equal, round, and reactive to light.   Neck: Neck supple.   Normal range of motion.  Cardiovascular:  Normal rate and regular rhythm.           No murmur heard.  Pulmonary/Chest: She has no wheezes. She has no rhonchi.   Abdominal: Abdomen is soft. She exhibits no distension. There is no abdominal tenderness.   Musculoskeletal:         General: Tenderness and edema present.      Cervical back: Normal range of motion and neck supple.      Right lower leg: 3+ Pitting Edema present.      Left lower le+ Pitting Edema present.        Legs:      Lymphadenopathy:     She has no cervical adenopathy.   Neurological: She is alert and oriented to person, place, and time. No cranial nerve deficit or sensory deficit.   Skin: Skin is dry. Capillary refill takes less than 2 seconds.   Psychiatric: She has a normal mood and affect. Thought content  normal.         Medical Screening Exam   See Full Note    ED Course   Procedures  Labs Reviewed - No data to display       Imaging Results              US Lower Extremity Veins Bilateral (Final result)  Result time 06/05/25 19:05:24      Final result by Mike Crow MD (06/05/25 19:05:24)                   Impression:      No evidence of deep venous thrombosis in either lower extremity.      Electronically signed by: Mike Crow MD  Date:    06/05/2025  Time:    19:05               Narrative:    EXAMINATION:  US LOWER EXTREMITY VEINS BILATERAL    CLINICAL HISTORY:  Edema, unspecified    TECHNIQUE:  Duplex and color flow Doppler and dynamic compression was performed of the bilateral lower extremity veins was performed.    COMPARISON:  None    FINDINGS:  Right thigh veins: The common femoral, femoral, popliteal, upper greater saphenous, and deep femoral veins are patent and free of thrombus. The veins are normally compressible and have normal phasic flow and augmentation response.    Right calf veins: The visualized calf veins are patent.    Left thigh veins: The common femoral, femoral, popliteal, upper greater saphenous, and deep femoral veins are patent and free of thrombus. The veins are normally compressible and have normal phasic flow and augmentation response.    Left calf veins: The visualized calf veins are patent.    Miscellaneous: None                                       X-Ray Chest AP Portable (Final result)  Result time 06/05/25 19:34:52      Final result by Eh Aguilar MD (06/05/25 19:34:52)                   Impression:      1. Interstitial findings are accentuated by habitus, superimposed edema is a consideration.  No large focal consolidation.      Electronically signed by: Eh Aguilar MD  Date:    06/05/2025  Time:    19:34               Narrative:    EXAMINATION:  XR CHEST AP PORTABLE    CLINICAL HISTORY:  Edema, unspecified    TECHNIQUE:  Single frontal view of the chest was  performed.    COMPARISON:  01/17/2025    FINDINGS:  The cardiomediastinal silhouette is prominent, magnified by technique noting calcification of the aorta..  There is no pleural effusion.  The trachea is midline.  The lungs are symmetrically expanded bilaterally with coarse interstitial attenuation, accentuated by habitus..  No large focal consolidation seen.  There is no pneumothorax.  The osseous structures are remarkable for degenerative change..                                       Medications - No data to display  Medical Decision Making  55 year old female presents to ED with complaint of leg swelling. Patient reports being in the hospital at [facility] in April and discharged. She reports she was admitted for extended time and was discharged with wound vac. She states she was discontinued from the wound vac and does dressing changes to the area. She states she experienced swelling and has history of CHF. She states they initially tried to remove the extra fluid with her having more pulled off with dialysis. She states swelling improved but continued to her right calf. She reports pain and tenderness to the area. She reports she was instructed to come to ED for evaluation of DVT with recent surgery. Patient is status post exploratory lap with lysis of adhesions and colostomy. Patient had perforated sigmoid colon. Denies chest pain, shortness of breath, nausea/vomiting, fever, chills.     Diagnostics ordered and reviewed  Radiologist interpretation reviewed    Amount and/or Complexity of Data Reviewed  Radiology: ordered.                                      Clinical Impression:   Final diagnoses:  [R60.9] Swelling  [M79.604] Right leg pain (Primary)        ED Disposition Condition    Discharge Stable          ED Prescriptions    None       Follow-up Information    None              [1]   Social History  Tobacco Use    Smoking status: Never     Passive exposure: Never    Smokeless tobacco: Never   Substance Use  Topics    Alcohol use: Not Currently     Comment: Occasional 1 or less glass of wine    Drug use: Never        Letty Alejandro, [facility]  06/05/25 1941

## 2025-06-06 NOTE — DISCHARGE INSTRUCTIONS
Follow up with PCP in 48-72 hours. Return to ED if any new or worsening of symptoms occur. Follow up with Cardiology related to CHF.

## 2025-06-11 ENCOUNTER — TELEPHONE (OUTPATIENT)
Dept: FAMILY MEDICINE | Facility: CLINIC | Age: 55
End: 2025-06-11
Payer: MEDICARE

## 2025-06-11 NOTE — TELEPHONE ENCOUNTER
Copied from CRM #[id number]. Topic: Appointments - Same Day Access  >> Jun 11, 2025  3:04 PM Brandi wrote:  Who Called: Yaa Frank    Caller is requesting a sooner appointment. Caller declined first available appointment listed below. Caller will not accept being placed on the waitlist and is requesting a message be sent to doctor.    When is the first available appointment?  Options offered (Virtual Visit, Urgent Care):   Symptoms: Hospital f/u from ER       Preferred Method of Contact: Phone Call  Patient's Preferred Phone Number on File: [phone]   Best Call Back Number, if different:  Additional Information:

## 2025-06-12 ENCOUNTER — OFFICE VISIT (OUTPATIENT)
Dept: FAMILY MEDICINE | Facility: CLINIC | Age: 55
End: 2025-06-12
Payer: MEDICARE

## 2025-06-12 VITALS
BODY MASS INDEX: 42.14 KG/M2 | SYSTOLIC BLOOD PRESSURE: 127 MMHG | HEIGHT: 62 IN | HEART RATE: 83 BPM | WEIGHT: 229 LBS | OXYGEN SATURATION: 93 % | DIASTOLIC BLOOD PRESSURE: 86 MMHG | TEMPERATURE: 99 F

## 2025-06-12 DIAGNOSIS — Z78.9 ON SUPPLEMENTAL OXYGEN BY NASAL CANNULA: ICD-10-CM

## 2025-06-12 DIAGNOSIS — N18.6 ESRD (END STAGE RENAL DISEASE) ON DIALYSIS: ICD-10-CM

## 2025-06-12 DIAGNOSIS — E66.01 MORBID OBESITY: ICD-10-CM

## 2025-06-12 DIAGNOSIS — I73.9 PERIPHERAL VASCULAR DISEASE, UNSPECIFIED: Primary | ICD-10-CM

## 2025-06-12 DIAGNOSIS — Z99.2 ESRD (END STAGE RENAL DISEASE) ON DIALYSIS: ICD-10-CM

## 2025-06-12 DIAGNOSIS — M79.89 LOCALIZED SWELLING OF BOTH LOWER EXTREMITIES: ICD-10-CM

## 2025-06-12 DIAGNOSIS — L81.9 DISCOLORATION OF SKIN: ICD-10-CM

## 2025-06-12 DIAGNOSIS — Z74.09 OTHER REDUCED MOBILITY: ICD-10-CM

## 2025-06-12 DIAGNOSIS — I50.9 CHRONIC HEART FAILURE, UNSPECIFIED HEART FAILURE TYPE: ICD-10-CM

## 2025-06-12 PROCEDURE — 3074F SYST BP LT 130 MM HG: CPT | Mod: ,,,

## 2025-06-12 PROCEDURE — 3079F DIAST BP 80-89 MM HG: CPT | Mod: ,,,

## 2025-06-12 PROCEDURE — 1160F RVW MEDS BY RX/DR IN RCRD: CPT | Mod: ,,,

## 2025-06-12 PROCEDURE — 1159F MED LIST DOCD IN RCRD: CPT | Mod: ,,,

## 2025-06-12 PROCEDURE — 3008F BODY MASS INDEX DOCD: CPT | Mod: ,,,

## 2025-06-12 PROCEDURE — 99214 OFFICE O/P EST MOD 30 MIN: CPT | Mod: ,,,

## 2025-06-12 NOTE — PROGRESS NOTES
Carlee Ford NP   [address] [place] [address]     PATIENT NAME: Yaa Frank  : 1970  DATE: 25  MRN: [id number]      Billing Provider: Carlee Ford NP  Level of Service:   Patient PCP Information       Provider PCP Type    GLENIS Sanchez [facility]            Reason for Visit / Chief Complaint: Hospital Follow Up (Patient presents for follow up from [facility]. Patient states she has been having swelling to both lower extremities and pain. Patient states they did an ultrasound and no blood clot was seen. Patient states they have been pulling off extra fluid at dialysis. Patient states her chest x-ray showed calcification and was concerned and would like referred to cardiologist. )       Update PCP  Update Chief Complaint         History of Present Illness / Problem Focused Workflow     Yaa Frank presents to the clinic with Hospital Follow Up (Patient presents for follow up from [facility]. Patient states she has been having swelling to both lower extremities and pain. Patient states they did an ultrasound and no blood clot was seen. Patient states they have been pulling off extra fluid at dialysis. Patient states her chest x-ray showed calcification and was concerned and would like referred to cardiologist. )     Patient here today for ER follow up. Was seen in ER on 25 for pain and swelling in right leg. Ultrasound was negative. She is followed by cardiology (at [facility]), GI, and wound care. She has a colostomy, hx of bowel resection. Currently dependent on oxygen and is wearing portable oxygen at 2l/nc. She is dependent on hemodialysis (at [facility]). BP at goal today on carvedilol. She is receiving home health. Today she has complaints of tightness and swelling in right leg and darkness in both legs. She does get SOB with exertion, which which is normal for her. Denies chest pain. She is requesting referral to vascular. Advised to keep scheduled PCP appt on 25.  Return to clinic as needed.         Review of Systems     Review of Systems   Constitutional: Negative.    HENT: Negative.     Eyes: Negative.    Respiratory:  Positive for shortness of breath. Negative for cough and wheezing.    Cardiovascular:  Positive for leg swelling. Negative for chest pain.   Gastrointestinal:  Positive for abdominal pain.   Endocrine: Negative.    Genitourinary:  Positive for difficulty urinating (hemodialysis).   Musculoskeletal:  Positive for leg pain.   Integumentary:  Positive for color change (lower ext's) and wound (abdomina;).   Allergic/Immunologic: Negative.    Neurological: Negative.    Psychiatric/Behavioral: Negative.          Medical / Social / Family History     Past Medical History:   Diagnosis Date    Asthma     CHF (congestive heart failure)     COVID-19     Disorder of kidney and ureter     Hyperlipidemia     Hypertension     Neuropathy     Peripheral vascular disease, unspecified        Past Surgical History:   Procedure Laterality Date    APPENDECTOMY       SECTION      CHOLECYSTECTOMY      COLOSTOMY  2025    LAPAROSCOPIC GASTRIC BANDING      PLACEMENT OF DIALYSIS ACCESS Left     TUBAL LIGATION         Social History  Ms.  reports that she has never smoked. She has never been exposed to tobacco smoke. She has never used smokeless tobacco. She reports that she does not currently use alcohol. She reports that she does not use drugs.    Family History  Ms.'s family history includes Arthritis in her father and mother; Asthma in her son; Breast cancer in her cousin and paternal aunt; Diabetes in her father and mother; Hypertension in her father and mother.    Medications and Allergies     Medications  Outpatient Medications Marked as Taking for the 25 encounter (Office Visit) with Carlee Ford NP   Medication Sig Dispense Refill    albuterol (PROVENTIL/VENTOLIN HFA) 90 mcg/actuation inhaler Inhale 2 puffs into the lungs every 4 to 6 hours as  "needed.      albuterol-ipratropium (DUO-NEB) 2.5 mg-0.5 mg/3 mL nebulizer solution       carvediloL (COREG) 6.25 MG tablet Take 6.25 mg by mouth 2 (two) times daily with meals.      cinacalcet (SENSIPAR) 30 MG Tab Take 30 mg by mouth every evening.      fluticasone propionate (FLONASE) 50 mcg/actuation nasal spray 1 spray (50 mcg total) by Each Nostril route once daily. 16 g 3    gabapentin (NEURONTIN) 100 MG capsule Take 1 capsule (100 mg total) by mouth 2 (two) times daily. 180 capsule 1    hydrocortisone 2.5 % cream Apply topically 2 (two) times daily. 20 g 1    pantoprazole (PROTONIX) 40 MG tablet Take 1 tablet (40 mg total) by mouth once daily. 90 tablet 1    sevelamer carbonate (RENVELA) 800 mg Tab Take 800 mg by mouth 4 (four) times daily with meals and nightly. 4 TABLETS WITH EACH MEAL. 2 WITH EACH SNACK.      simvastatin (ZOCOR) 20 MG tablet Take 1 tablet (20 mg total) by mouth every evening. 90 tablet 1    WIXELA INHUB 250-50 mcg/dose diskus inhaler Inhale 1 puff into the lungs 2 (two) times a day.         Allergies  Review of patient's allergies indicates:   Allergen Reactions    Ancef [cefazolin]     Hydralazine     Hydralazine analogues     Latex, natural rubber        Physical Examination   /86 (BP Location: Right arm, Patient Position: Sitting)   Pulse 83   Temp 98.6 °F (37 °C) (Oral)   Ht 5' 2" (1.575 m)   Wt 103.9 kg (229 lb)   LMP  (LMP Unknown)   SpO2 (!) 93%   BMI 41.88 kg/m²    Physical Exam  Vitals reviewed.   Constitutional:       Appearance: Normal appearance. She is obese.   HENT:      Mouth/Throat:      Mouth: Mucous membranes are moist.      Pharynx: Oropharynx is clear.   Eyes:      Extraocular Movements: Extraocular movements intact.      Conjunctiva/sclera: Conjunctivae normal.      Pupils: Pupils are equal, round, and reactive to light.   Cardiovascular:      Rate and Rhythm: Normal rate and regular rhythm.      Pulses: Normal pulses.           Dorsalis pedis pulses are 2+ " on the right side and 2+ on the left side.        Posterior tibial pulses are 2+ on the right side and 2+ on the left side.      Heart sounds: Normal heart sounds.   Pulmonary:      Effort: Pulmonary effort is normal. No respiratory distress.      Breath sounds: Normal breath sounds. No wheezing or rales.   Abdominal:      General: Bowel sounds are normal.      Palpations: Abdomen is soft.      Tenderness: There is no abdominal tenderness. There is no guarding.   Musculoskeletal:      Cervical back: Normal range of motion and neck supple.      Right lower leg: Edema present.      Left lower leg: Edema present.   Skin:     General: Skin is warm and dry.      Capillary Refill: Capillary refill takes less than 2 seconds.      Comments: Discoloration of bilateral lower ext's   Neurological:      General: No focal deficit present.      Mental Status: She is alert and oriented to person, place, and time.   Psychiatric:         Mood and Affect: Mood normal.         Behavior: Behavior normal.        Assessment and Plan (including Health Maintenance)      Problem List  Smart Sets  Document Outside HM   :    Plan:   referral to vascular.   Advised to keep scheduled PCP appt on 7/14/25.  Return to clinic as needed.             Health Maintenance Due   Topic Date Due    Shingles Vaccine (2 of 2) 07/31/2023    Hemoglobin A1c  05/13/2025       Problem List Items Addressed This Visit    None      Health Maintenance Topics with due status: Not Due       Topic Last Completion Date    Cervical Cancer Screening 08/09/2021    Colorectal Cancer Screening 07/20/2022    TETANUS VACCINE 06/05/2023    Lipid Panel 03/25/2025    Mammogram 04/02/2025    RSV Vaccine (Age 60+ and Pregnant patients) Not Due       Future Appointments   Date Time Provider Department Center   7/14/2025  9:00 AM Corie Juárez ACNP [facility]   4/8/2026  2:00 PM [facility]   6/1/2026 11:00 AM AWV NURSE, [facility]  FAMILY MEDICINE [facility] [place]            Signature:  Carlee Ford NP      [address] [place] [address]    Date of encounter: 6/12/25

## 2025-06-18 NOTE — PATIENT INSTRUCTIONS
referral to vascular.   Advised to keep scheduled PCP appt on 7/14/25.  Return to clinic as needed.

## 2025-07-14 ENCOUNTER — OFFICE VISIT (OUTPATIENT)
Dept: FAMILY MEDICINE | Facility: CLINIC | Age: 55
End: 2025-07-14
Payer: MEDICARE

## 2025-07-14 VITALS
OXYGEN SATURATION: 97 % | HEART RATE: 59 BPM | WEIGHT: 222.69 LBS | TEMPERATURE: 99 F | BODY MASS INDEX: 40.98 KG/M2 | HEIGHT: 62 IN | DIASTOLIC BLOOD PRESSURE: 79 MMHG | RESPIRATION RATE: 16 BRPM | SYSTOLIC BLOOD PRESSURE: 151 MMHG

## 2025-07-14 DIAGNOSIS — N18.6 ESRD (END STAGE RENAL DISEASE) ON DIALYSIS: ICD-10-CM

## 2025-07-14 DIAGNOSIS — Z78.9 ON SUPPLEMENTAL OXYGEN BY NASAL CANNULA: ICD-10-CM

## 2025-07-14 DIAGNOSIS — E78.5 HYPERLIPIDEMIA, UNSPECIFIED HYPERLIPIDEMIA TYPE: ICD-10-CM

## 2025-07-14 DIAGNOSIS — Z74.09 OTHER REDUCED MOBILITY: ICD-10-CM

## 2025-07-14 DIAGNOSIS — I10 ESSENTIAL (PRIMARY) HYPERTENSION: Primary | ICD-10-CM

## 2025-07-14 DIAGNOSIS — Z93.3 COLOSTOMY IN PLACE: ICD-10-CM

## 2025-07-14 DIAGNOSIS — R73.01 IFG (IMPAIRED FASTING GLUCOSE): ICD-10-CM

## 2025-07-14 DIAGNOSIS — Z90.49 S/P COLON RESECTION: ICD-10-CM

## 2025-07-14 DIAGNOSIS — Z99.2 ESRD (END STAGE RENAL DISEASE) ON DIALYSIS: ICD-10-CM

## 2025-07-14 DIAGNOSIS — G62.9 NEUROPATHY: ICD-10-CM

## 2025-07-14 LAB
ALBUMIN SERPL BCP-MCNC: 3.4 G/DL (ref 3.5–5)
ALBUMIN/GLOB SERPL: 0.8 {RATIO}
ALP SERPL-CCNC: 83 U/L (ref 40–150)
ALT SERPL W P-5'-P-CCNC: 9 U/L
ANION GAP SERPL CALCULATED.3IONS-SCNC: 17 MMOL/L (ref 7–16)
AST SERPL W P-5'-P-CCNC: 15 U/L (ref 11–45)
BASOPHILS # BLD AUTO: 0.07 K/UL (ref 0–0.2)
BASOPHILS NFR BLD AUTO: 1.2 % (ref 0–1)
BILIRUB SERPL-MCNC: 0.5 MG/DL
BUN SERPL-MCNC: 36 MG/DL (ref 10–20)
BUN/CREAT SERPL: 4 (ref 6–20)
CALCIUM SERPL-MCNC: 8.9 MG/DL (ref 8.4–10.2)
CHLORIDE SERPL-SCNC: 98 MMOL/L (ref 98–107)
CHOLEST SERPL-MCNC: 153 MG/DL
CHOLEST/HDLC SERPL: 3.5 {RATIO}
CO2 SERPL-SCNC: 33 MMOL/L (ref 22–29)
CREAT SERPL-MCNC: 8.36 MG/DL (ref 0.55–1.02)
DIFFERENTIAL METHOD BLD: ABNORMAL
EGFR (NO RACE VARIABLE) (RUSH/TITUS): 5 ML/MIN/1.73M2
EOSINOPHIL # BLD AUTO: 0.15 K/UL (ref 0–0.5)
EOSINOPHIL NFR BLD AUTO: 2.7 % (ref 1–4)
ERYTHROCYTE [DISTWIDTH] IN BLOOD BY AUTOMATED COUNT: 14.6 % (ref 11.5–14.5)
EST. AVERAGE GLUCOSE BLD GHB EST-MCNC: 85 MG/DL
GLOBULIN SER-MCNC: 4.5 G/DL (ref 2–4)
GLUCOSE SERPL-MCNC: 77 MG/DL (ref 74–100)
HBA1C MFR BLD HPLC: 4.6 %
HCT VFR BLD AUTO: 36 % (ref 38–47)
HDLC SERPL-MCNC: 44 MG/DL (ref 35–60)
HGB BLD-MCNC: 10.5 G/DL (ref 12–16)
IMM GRANULOCYTES # BLD AUTO: 0.01 K/UL (ref 0–0.04)
IMM GRANULOCYTES NFR BLD: 0.2 % (ref 0–0.4)
LDLC SERPL CALC-MCNC: 84 MG/DL
LDLC/HDLC SERPL: 1.9 {RATIO}
LYMPHOCYTES # BLD AUTO: 1.23 K/UL (ref 1–4.8)
LYMPHOCYTES NFR BLD AUTO: 21.8 % (ref 27–41)
MCH RBC QN AUTO: 26.1 PG (ref 27–31)
MCHC RBC AUTO-ENTMCNC: 29.2 G/DL (ref 32–36)
MCV RBC AUTO: 89.3 FL (ref 80–96)
MONOCYTES # BLD AUTO: 0.46 K/UL (ref 0–0.8)
MONOCYTES NFR BLD AUTO: 8.1 % (ref 2–6)
MPC BLD CALC-MCNC: 10.5 FL (ref 9.4–12.4)
NEUTROPHILS # BLD AUTO: 3.73 K/UL (ref 1.8–7.7)
NEUTROPHILS NFR BLD AUTO: 66 % (ref 53–65)
NONHDLC SERPL-MCNC: 109 MG/DL
NRBC # BLD AUTO: 0 X10E3/UL
NRBC, AUTO (.00): 0 %
PLATELET # BLD AUTO: 220 K/UL (ref 150–400)
POTASSIUM SERPL-SCNC: 3.8 MMOL/L (ref 3.5–5.1)
PROT SERPL-MCNC: 7.9 G/DL (ref 6.4–8.3)
RBC # BLD AUTO: 4.03 M/UL (ref 4.2–5.4)
SODIUM SERPL-SCNC: 144 MMOL/L (ref 136–145)
TRIGL SERPL-MCNC: 124 MG/DL (ref 37–140)
VLDLC SERPL-MCNC: 25 MG/DL
WBC # BLD AUTO: 5.65 K/UL (ref 4.5–11)

## 2025-07-14 PROCEDURE — 99213 OFFICE O/P EST LOW 20 MIN: CPT | Mod: ,,, | Performed by: NURSE PRACTITIONER

## 2025-07-14 PROCEDURE — 3078F DIAST BP <80 MM HG: CPT | Mod: ,,, | Performed by: NURSE PRACTITIONER

## 2025-07-14 PROCEDURE — 85025 COMPLETE CBC W/AUTO DIFF WBC: CPT | Mod: ,,, | Performed by: CLINICAL MEDICAL LABORATORY

## 2025-07-14 PROCEDURE — 3077F SYST BP >= 140 MM HG: CPT | Mod: ,,, | Performed by: NURSE PRACTITIONER

## 2025-07-14 PROCEDURE — 80061 LIPID PANEL: CPT | Mod: ,,, | Performed by: CLINICAL MEDICAL LABORATORY

## 2025-07-14 PROCEDURE — 83036 HEMOGLOBIN GLYCOSYLATED A1C: CPT | Mod: ,,, | Performed by: CLINICAL MEDICAL LABORATORY

## 2025-07-14 PROCEDURE — 80053 COMPREHEN METABOLIC PANEL: CPT | Mod: ,,, | Performed by: CLINICAL MEDICAL LABORATORY

## 2025-07-14 PROCEDURE — 3008F BODY MASS INDEX DOCD: CPT | Mod: ,,, | Performed by: NURSE PRACTITIONER

## 2025-07-14 PROCEDURE — 1159F MED LIST DOCD IN RCRD: CPT | Mod: ,,, | Performed by: NURSE PRACTITIONER

## 2025-07-14 PROCEDURE — 3044F HG A1C LEVEL LT 7.0%: CPT | Mod: ,,, | Performed by: NURSE PRACTITIONER

## 2025-07-14 PROCEDURE — 1160F RVW MEDS BY RX/DR IN RCRD: CPT | Mod: ,,, | Performed by: NURSE PRACTITIONER

## 2025-07-14 RX ORDER — GABAPENTIN 100 MG/1
100 CAPSULE ORAL 2 TIMES DAILY
Qty: 180 CAPSULE | Refills: 1 | Status: CANCELLED | OUTPATIENT
Start: 2025-07-14

## 2025-07-14 RX ORDER — CLINDAMYCIN HYDROCHLORIDE 150 MG/1
150 CAPSULE ORAL EVERY 8 HOURS
Qty: 21 CAPSULE | Refills: 0 | Status: SHIPPED | OUTPATIENT
Start: 2025-07-14 | End: 2025-07-15

## 2025-07-15 PROBLEM — Z90.49 S/P COLON RESECTION: Status: ACTIVE | Noted: 2025-07-15

## 2025-07-15 PROBLEM — Z93.3 COLOSTOMY IN PLACE: Status: ACTIVE | Noted: 2025-07-15

## 2025-07-15 NOTE — ASSESSMENT & PLAN NOTE
Has been working with PT at home.  Pt has lost some weight and improved her stamina  Encouraged to cont to walk

## 2025-07-15 NOTE — ASSESSMENT & PLAN NOTE
BP Readings from Last 3 Encounters:   07/14/25 (!) 151/79   06/12/25 127/86   06/05/25 133/79    Typically  well controlled. Will not adjust medications due to hypotension during dialysis.

## 2025-07-15 NOTE — ASSESSMENT & PLAN NOTE
"Lab Results   Component Value Date    CHOL 153 07/14/2025    CHOL 127 03/25/2025    CHOL 130 12/09/2024      Lab Results   Component Value Date    HDL 44 07/14/2025    HDL 44 03/25/2025    HDL 41 12/09/2024     Lab Results   Component Value Date    LDLCALC 84 07/14/2025    LDLCALC 66 03/25/2025    LDLCALC 74 12/09/2024      Lab Results   Component Value Date    TRIG 124 07/14/2025    TRIG 84 03/25/2025    TRIG 77 12/09/2024     No results found for: "TOTALCHOLEST"  Lab Results   Component Value Date    NONHDLCHOL 109 07/14/2025    NONHDLCHOL 83 03/25/2025    NONHDLCHOL 89 12/09/2024     Lab Results   Component Value Date    CHOLHDL 3.5 07/14/2025    CHOLHDL 2.9 03/25/2025    CHOLHDL 3.2 12/09/2024    The current medical regimen is effective;  continue present plan and medications.   Stable on zocor  "

## 2025-07-15 NOTE — PROGRESS NOTES
GLENIS Sanchez   [facility] [address] [place] [address]  [phone]      PATIENT NAME: Yaa Frank  : 1970  DATE: 25  MRN: [id number]      Billing Provider: GLENIS Sanchez  Level of Service:   Patient PCP Information       Provider PCP Type    GLENIS Sanchez General            Reason for Visit / Chief Complaint: Follow-up (.Shingles Vaccine(2 of 2) due on 2023/Hemoglobin A1c due on 2025 )       Update PCP  Update Chief Complaint         History of Present Illness / Problem Focused Workflow     Yaa Frank presents to the clinic with Follow-up (.Shingles Vaccine(2 of 2) due on 2023/Hemoglobin A1c due on 2025 )     Pt presents for routine follow up with lab and med refills. Overall doing well.      BP appears  controlled today. Home meds reviewed    The current medical regimen is effective;  continue present plan and medications. Recommended patient to check home readings to monitor and see me for followup as scheduled or sooner as needed.   Discussed sodium restriction, maintaining ideal body weight and regular exercise program as physiologic means to continue to achieve blood pressure control in addition to medication compliance.  Patient was educated that both decongestant and anti-inflammatory medication may raise blood pressure.    Advised to monitor BP at home. Advised on optimal BP readings - SBP < 130 & DBP < 80. Advised to call office for any persistent BP elevation and may have to prescribe or adjust BP med(s).  Recommended DASH diet, stay well hydrated with water daily, eliminate or decrease caffeinated and high calorie drinks, increase physical activity, and lose weight if BMI > 25.0. Written patient education information provided to patient with goals and recommendations to assist with BP management.     Discussed need for low fat/low  cholesterol diet, regular exercise, and weight control.   Cardiovascular risk and specific lipid/LDL goals reviewed.    Overall patient is doing much better.  She appears to be much healthier.  She has lost some weight.  She has been working with physical therapy at home to improve her stamina and strength following her colon resection.  Patient reports an improved in her stamina and oxygen levels.  She has been able to sit at rest at home using room air.  She does continue to use her oxygen with any exertional type activities.  She is doing well with her colostomy.  Incision is healing well she has been released by the surgeon [facility] continues to assist with monitoring healing progress.  She is no longer on wound VAC.  Mostly the wound is well healed.        Review of Systems     Review of Systems   Constitutional:  Positive for fatigue. Negative for fever.   HENT:  Negative for nasal congestion and sore throat.    Eyes:  Negative for visual disturbance.   Respiratory:  Positive for shortness of breath. Negative for chest tightness.    Cardiovascular:  Negative for chest pain and leg swelling.   Gastrointestinal:  Negative for abdominal pain and change in bowel habit.   Endocrine: Negative for polydipsia, polyphagia and polyuria.   Genitourinary:  Negative for dysuria and hematuria.   Musculoskeletal:  Negative for back pain and leg pain.   Integumentary:  Negative for rash.   Neurological:  Negative for dizziness, syncope, weakness and light-headedness.        Medical / Social / Family History     Past Medical History:   Diagnosis Date    Asthma     CHF (congestive heart failure)     COVID-19     Disorder of kidney and ureter     Hyperlipidemia     Hypertension     Neuropathy     Peripheral vascular disease, unspecified        Past Surgical History:   Procedure Laterality Date    APPENDECTOMY       SECTION      CHOLECYSTECTOMY      COLOSTOMY  2025    LAPAROSCOPIC GASTRIC BANDING      PLACEMENT  OF DIALYSIS ACCESS Left     TUBAL LIGATION  11/03       Social History  Ms. Frank  reports that she has never smoked. She has never been exposed to tobacco smoke. She has never used smokeless tobacco. She reports that she does not currently use alcohol. She reports that she does not use drugs.    Family History  Ms. Frank's family history includes Arthritis in her father and mother; Asthma in her son; Breast cancer in her cousin and paternal aunt; Diabetes in her father and mother; Hypertension in her father and mother.    Medications and Allergies     Medications  Outpatient Medications Marked as Taking for the 7/14/25 encounter (Office Visit) with Corie Juárez ACNP   Medication Sig Dispense Refill    albuterol (PROVENTIL/VENTOLIN HFA) 90 mcg/actuation inhaler Inhale 2 puffs into the lungs every 4 to 6 hours as needed.      albuterol-ipratropium (DUO-NEB) 2.5 mg-0.5 mg/3 mL nebulizer solution       carvediloL (COREG) 6.25 MG tablet Take 6.25 mg by mouth 2 (two) times daily with meals.      cinacalcet (SENSIPAR) 30 MG Tab Take 30 mg by mouth every evening.      fluticasone propionate (FLONASE) 50 mcg/actuation nasal spray 1 spray (50 mcg total) by Each Nostril route once daily. 16 g 3    gabapentin (NEURONTIN) 100 MG capsule Take 1 capsule (100 mg total) by mouth 2 (two) times daily. 180 capsule 1    hydrocortisone 2.5 % cream Apply topically 2 (two) times daily. 20 g 1    ofloxacin (FLOXIN) 0.3 % otic solution Place 5 drops into both ears once daily. 5 mL 0    pantoprazole (PROTONIX) 40 MG tablet Take 1 tablet (40 mg total) by mouth once daily. 90 tablet 1    sevelamer carbonate (RENVELA) 800 mg Tab Take 800 mg by mouth 4 (four) times daily with meals and nightly. 4 TABLETS WITH EACH MEAL. 2 WITH EACH SNACK.      simvastatin (ZOCOR) 20 MG tablet Take 1 tablet (20 mg total) by mouth every evening. 90 tablet 1    WIXELA INHUB 250-50 mcg/dose diskus inhaler Inhale 1 puff into the lungs 2 (two) times a day.          Allergies  Review of patient's allergies indicates:   Allergen Reactions    Ancef [cefazolin]     Hydralazine     Hydralazine analogues     Latex, natural rubber     Betadine [povidone-iodine] Rash       Physical Examination     Vitals:    07/14/25 0928   BP: (!) 151/79   Pulse: (!) 59   Resp: 16   Temp: 98.7 °F (37.1 °C)     Physical Exam  Eyes:      Pupils: Pupils are equal, round, and reactive to light.   Cardiovascular:      Rate and Rhythm: Normal rate and regular rhythm.      Heart sounds: Normal heart sounds. No murmur heard.  Pulmonary:      Breath sounds: Normal breath sounds. No wheezing, rhonchi or rales.   Abdominal:      General: Bowel sounds are normal.          Comments: Healing surgical site. Doing well. Colostomy noted.   Musculoskeletal:         General: No swelling.      Cervical back: Normal range of motion and neck supple.   Skin:     General: Skin is warm and dry.   Neurological:      Mental Status: She is alert and oriented to person, place, and time.          Lab Results   Component Value Date    WBC 5.65 07/14/2025    HGB 10.5 (L) 07/14/2025    HCT 36.0 (L) 07/14/2025    MCV 89.3 07/14/2025     07/14/2025        Sodium   Date Value Ref Range Status   07/14/2025 144 136 - 145 mmol/L Final     Potassium   Date Value Ref Range Status   07/14/2025 3.8 3.5 - 5.1 mmol/L Final     Chloride   Date Value Ref Range Status   07/14/2025 98 98 - 107 mmol/L Final     CO2   Date Value Ref Range Status   07/14/2025 33 (H) 22 - 29 mmol/L Final     Glucose   Date Value Ref Range Status   07/14/2025 77 74 - 100 mg/dL Final     BUN   Date Value Ref Range Status   07/14/2025 36 (H) 10 - 20 mg/dL Final     Creatinine   Date Value Ref Range Status   07/14/2025 8.36 (H) 0.55 - 1.02 mg/dL Final     Calcium   Date Value Ref Range Status   07/14/2025 8.9 8.4 - 10.2 mg/dL Final     Total Protein   Date Value Ref Range Status   07/14/2025 7.9 6.4 - 8.3 g/dL Final     Albumin   Date Value Ref Range Status  "  07/14/2025 3.4 (L) 3.5 - 5.0 g/dL Final     Bilirubin, Total   Date Value Ref Range Status   07/14/2025 0.5 <=1.5 mg/dL Final     Alk Phos   Date Value Ref Range Status   07/14/2025 83 40 - 150 U/L Final     AST   Date Value Ref Range Status   07/14/2025 15 11 - 45 U/L Final     ALT   Date Value Ref Range Status   07/14/2025 9 <=55 U/L Final     Anion Gap   Date Value Ref Range Status   07/14/2025 17 (H) 7 - 16 mmol/L Final     eGFR   Date Value Ref Range Status   07/14/2025 5 (L) >=60 mL/min/1.73m2 Final     Comment:     Estimated GFR calculated using the CKD-EPI creatinine (2021) equation.      Lab Results   Component Value Date    HGBA1C 4.6 07/14/2025      Lab Results   Component Value Date    CHOL 153 07/14/2025    CHOL 127 03/25/2025    CHOL 130 12/09/2024     Lab Results   Component Value Date    HDL 44 07/14/2025    HDL 44 03/25/2025    HDL 41 12/09/2024     Lab Results   Component Value Date    LDLCALC 84 07/14/2025    LDLCALC 66 03/25/2025    LDLCALC 74 12/09/2024     No results found for: "DLDL"  Lab Results   Component Value Date    TRIG 124 07/14/2025    TRIG 84 03/25/2025    TRIG 77 12/09/2024     Lab Results   Component Value Date    CHOLHDL 3.5 07/14/2025    CHOLHDL 2.9 03/25/2025    CHOLHDL 3.2 12/09/2024      Lab Results   Component Value Date    TSH 6.820 (H) 12/21/2022    FREET4 1.05 12/21/2022        Assessment and Plan (including Health Maintenance)      Problem List  Smart Sets  Document Outside HM   :    Plan:     1. Essential (primary) hypertension  Assessment & Plan:  BP Readings from Last 3 Encounters:   07/14/25 (!) 151/79   06/12/25 127/86   06/05/25 133/79    Typically  well controlled. Will not adjust medications due to hypotension during dialysis.       Orders:  -     CBC Auto Differential; Future; Expected date: 07/14/2025  -     Comprehensive Metabolic Panel; Future; Expected date: 07/14/2025    2. Hyperlipidemia, unspecified hyperlipidemia type  Assessment & Plan:  Lab Results " "  Component Value Date    CHOL 153 07/14/2025    CHOL 127 03/25/2025    CHOL 130 12/09/2024      Lab Results   Component Value Date    HDL 44 07/14/2025    HDL 44 03/25/2025    HDL 41 12/09/2024     Lab Results   Component Value Date    LDLCALC 84 07/14/2025    LDLCALC 66 03/25/2025    LDLCALC 74 12/09/2024      Lab Results   Component Value Date    TRIG 124 07/14/2025    TRIG 84 03/25/2025    TRIG 77 12/09/2024     No results found for: "TOTALCHOLEST"  Lab Results   Component Value Date    NONHDLCHOL 109 07/14/2025    NONHDLCHOL 83 03/25/2025    NONHDLCHOL 89 12/09/2024     Lab Results   Component Value Date    CHOLHDL 3.5 07/14/2025    CHOLHDL 2.9 03/25/2025    CHOLHDL 3.2 12/09/2024    The current medical regimen is effective;  continue present plan and medications.   Stable on zocor    Orders:  -     Lipid Panel; Future; Expected date: 07/14/2025    3. Neuropathy    4. IFG (impaired fasting glucose)  -     Hemoglobin A1C; Future; Expected date: 07/14/2025    5. ESRD (end stage renal disease) on dialysis  Assessment & Plan:  On dialysis TTS[facility]  Followed by Dr Schumacher in [place]       6. On supplemental oxygen by nasal cannula  Assessment & Plan:  Stable doing well   Able to use Room Air while resting at home.  Followed by pulmonary        7. S/P colon resection    8. Colostomy in place    9. Other reduced mobility  Assessment & Plan:  Has been working with PT at home.  Pt has lost some weight and improved her stamina  Encouraged to cont to walk       Other orders  -     clindamycin (CLEOCIN) 150 MG capsule; Take 1 capsule (150 mg total) by mouth every 8 (eight) hours. for 7 days  Dispense: 21 capsule; Refill: 0         There are no Patient Instructions on file for this visit.     Health Maintenance Due   Topic Date Due    Shingles Vaccine (2 of 2) 07/31/2023         Health Maintenance Topics with due status: Not Due       Topic Last Completion Date    Cervical Cancer Screening 08/09/2021    Colorectal Cancer " Screening 07/20/2022    TETANUS VACCINE 06/05/2023    Influenza Vaccine 09/04/2024    Mammogram 04/02/2025    Lipid Panel 07/14/2025    Hemoglobin A1c 07/14/2025    RSV Vaccine (Age 60+ and Pregnant patients) Not Due       Future Appointments   Date Time Provider Department Center   8/18/2025  9:00 AM Elliot Otoole DO OB VEIN [facility]   10/17/2025  8:20 AM Corei Juárez ACNP [facility] [place]   4/8/2026  2:00 PM [facility] [place]   6/1/2026 11:00 AM AWV NURSE, [facility] [place]            Signature:  GLENIS Sanchez  [facility] [address] [place] [address]  [phone]    Date of encounter: 7/14/25

## 2025-08-18 ENCOUNTER — INITIAL CONSULT (OUTPATIENT)
Dept: VASCULAR SURGERY | Facility: CLINIC | Age: 55
End: 2025-08-18
Payer: MEDICARE

## 2025-08-18 VITALS
HEART RATE: 67 BPM | RESPIRATION RATE: 16 BRPM | WEIGHT: 220 LBS | DIASTOLIC BLOOD PRESSURE: 85 MMHG | BODY MASS INDEX: 43.19 KG/M2 | SYSTOLIC BLOOD PRESSURE: 152 MMHG | HEIGHT: 60 IN

## 2025-08-18 DIAGNOSIS — L81.9 HYPERPIGMENTATION OF SKIN: ICD-10-CM

## 2025-08-18 DIAGNOSIS — M79.604 PAIN IN BOTH LOWER EXTREMITIES: Primary | ICD-10-CM

## 2025-08-18 DIAGNOSIS — R60.0 EDEMA, LOWER EXTREMITY: ICD-10-CM

## 2025-08-18 DIAGNOSIS — M79.605 PAIN IN BOTH LOWER EXTREMITIES: Primary | ICD-10-CM

## 2025-08-18 PROCEDURE — 99215 OFFICE O/P EST HI 40 MIN: CPT | Mod: PBBFAC | Performed by: FAMILY MEDICINE

## 2025-08-18 PROCEDURE — 99203 OFFICE O/P NEW LOW 30 MIN: CPT | Mod: S$PBB,,, | Performed by: FAMILY MEDICINE

## 2025-08-18 PROCEDURE — 99999 PR PBB SHADOW E&M-EST. PATIENT-LVL V: CPT | Mod: PBBFAC,,, | Performed by: FAMILY MEDICINE

## 2025-08-18 RX ORDER — MONTELUKAST SODIUM 10 MG/1
TABLET ORAL
COMMUNITY

## 2025-08-18 RX ORDER — ASCORBIC ACID, THIAMINE, RIBOFLAVIN, NIACINAMIDE, PYRIDOXINE, FOLIC ACID, COBALAMIN, BIOTIN, PANTOTHENIC ACID, ZINC 100; 1.5; 1.7; 20; 10; 1; 6; 300; 10; 5 MG/1; MG/1; MG/1; MG/1; MG/1; MG/1; UG/1; UG/1; MG/1; MG/1
TABLET, COATED ORAL DAILY
COMMUNITY